# Patient Record
Sex: MALE | Race: WHITE | NOT HISPANIC OR LATINO | Employment: OTHER | URBAN - METROPOLITAN AREA
[De-identification: names, ages, dates, MRNs, and addresses within clinical notes are randomized per-mention and may not be internally consistent; named-entity substitution may affect disease eponyms.]

---

## 2017-03-20 ENCOUNTER — OFFICE VISIT (OUTPATIENT)
Dept: FAMILY MEDICINE | Facility: CLINIC | Age: 72
End: 2017-03-20
Payer: MEDICARE

## 2017-03-20 VITALS
BODY MASS INDEX: 27.94 KG/M2 | TEMPERATURE: 103 F | HEIGHT: 67 IN | DIASTOLIC BLOOD PRESSURE: 72 MMHG | HEART RATE: 96 BPM | WEIGHT: 178 LBS | OXYGEN SATURATION: 95 % | SYSTOLIC BLOOD PRESSURE: 140 MMHG

## 2017-03-20 DIAGNOSIS — J11.1 INFLUENZA: Primary | ICD-10-CM

## 2017-03-20 DIAGNOSIS — I10 HYPERTENSION, BENIGN: ICD-10-CM

## 2017-03-20 PROCEDURE — 99214 OFFICE O/P EST MOD 30 MIN: CPT | Mod: PBBFAC,PO | Performed by: NURSE PRACTITIONER

## 2017-03-20 PROCEDURE — 99214 OFFICE O/P EST MOD 30 MIN: CPT | Mod: S$PBB,,, | Performed by: NURSE PRACTITIONER

## 2017-03-20 PROCEDURE — 99999 PR PBB SHADOW E&M-EST. PATIENT-LVL IV: CPT | Mod: PBBFAC,,, | Performed by: NURSE PRACTITIONER

## 2017-03-20 RX ORDER — OSELTAMIVIR PHOSPHATE 75 MG/1
75 CAPSULE ORAL 2 TIMES DAILY
Qty: 10 CAPSULE | Refills: 0 | Status: SHIPPED | OUTPATIENT
Start: 2017-03-20 | End: 2017-03-25

## 2017-03-20 RX ORDER — CODEINE PHOSPHATE AND GUAIFENESIN 10; 100 MG/5ML; MG/5ML
5 SOLUTION ORAL 3 TIMES DAILY PRN
Qty: 180 ML | Refills: 0 | Status: SHIPPED | OUTPATIENT
Start: 2017-03-20 | End: 2017-03-30

## 2017-03-20 NOTE — PROGRESS NOTES
Patient Name: Johnathan Waters    : 1945  MRN: [id number]    Subjective:  Johnathan Manning is a 71 y.o. male who presents today for     1. 1  Day cough, bodyaches, headache, nasasl congestion, fever, taking otc generic dextromethophen without improvement.     Past Medical History  Past Medical History:   Diagnosis Date    Elevated cholesterol     Hypertension        Past Surgical History  Past Surgical History:   Procedure Laterality Date    VASECTOMY         Family History  Family History   Problem Relation Age of Onset    Cancer Father        Social History  Social History     Social History    Marital status:      Spouse name: N/A    Number of children: N/A    Years of education: N/A     Occupational History    Not on file.     Social History Main Topics    Smoking status: Never Smoker    Smokeless tobacco: Never Used    Alcohol use Not on file    Drug use: Not on file    Sexual activity: Not on file     Other Topics Concern    Not on file     Social History Narrative       Allergies  Review of patient's allergies indicates:   Allergen Reactions    Bee sting [allergen ext-venom-honey bee]     Sulfa (sulfonamide antibiotics) Other (See Comments)     Patient unsure    -reviewed and updated      Medications  Reviewed and updated.   Current Outpatient Prescriptions   Medication Sig Dispense Refill    aspirin (ECOTRIN) 81 MG EC tablet Take 81 mg by mouth once daily.      clindamycin (CLEOCIN T) 1 % external solution Apply topically as needed. 60 mL 0    IBUPROFEN ORAL Take by mouth.      lisinopril-hydrochlorothiazide (PRINZIDE,ZESTORETIC) 10-12.5 mg per tablet Take 1 tablet by mouth once daily. 90 tablet 4    pravastatin (PRAVACHOL) 20 MG tablet Take 1 tablet (20 mg total) by mouth once daily. 90 tablet 4    tamsulosin (FLOMAX) 0.4 mg Cp24 Take 1 capsule (0.4 mg total) by mouth once daily. 90 capsule 4    guaifenesin-codeine 100-10 mg/5 ml (TUSSI-ORGANIDIN NR)  mg/5 mL syrup  "Take 5 mLs by mouth 3 (three) times daily as needed for Cough. 180 mL 0    oseltamivir (TAMIFLU) 75 MG capsule Take 1 capsule (75 mg total) by mouth 2 (two) times daily. 10 capsule 0     No current facility-administered medications for this visit.          Review of Systems   Constitutional: Positive for fever.   HENT: Positive for congestion and sore throat. Negative for ear pain.    Respiratory: Positive for cough.    Cardiovascular: Negative for chest pain.   Musculoskeletal: Positive for myalgias.   Neurological: Positive for headaches.         Physical Exam  BP (!) 140/72  Pulse 96  Temp (!) 103.1 °F (39.5 °C) (Oral)   Ht 5' 7" (1.702 m)  Wt 80.7 kg (178 lb 0.3 oz)  SpO2 95%  BMI 27.88 kg/m2  Physical Exam   Constitutional: He appears well-developed. No distress.   HENT:   Head: Normocephalic.   Right Ear: Tympanic membrane normal.   Left Ear: Tympanic membrane normal.   Nose: Mucosal edema present.   Mouth/Throat: No posterior oropharyngeal edema or posterior oropharyngeal erythema.   Eyes: Conjunctivae and EOM are normal.   Cardiovascular: Normal rate, regular rhythm and normal heart sounds.    Pulmonary/Chest: Effort normal and breath sounds normal.   Skin: He is not diaphoretic.     poct influenza negative    Assessment/Plan:  Johnathan Waters is a 71 y.o. male who presents today for :    Influenza  Treated empirically, Advise zyrtec or claritin for nasal symptoms, hydration, rest  Comments:  tylenol 650 mg every 4-6 hours as needed for fever, ibuprofen 400-600 mg every 6-8 hours as needed for pain and fever  Orders:  -     POCT Influenza A/B  -     guaifenesin-codeine 100-10 mg/5 ml (TUSSI-ORGANIDIN NR)  mg/5 mL syrup; Take 5 mLs by mouth 3 (three) times daily as needed for Cough.  Dispense: 180 mL; Refill: 0  -     oseltamivir (TAMIFLU) 75 MG capsule; Take 1 capsule (75 mg total) by mouth 2 (two) times daily.  Dispense: 10 capsule; Refill: 0    Hypertension, benign  Comments:  return in 2 " weeks for nurse blood pressure check        Return if symptoms worsen or fail to improve.

## 2017-03-20 NOTE — MR AVS SNAPSHOT
[facility]  [address]  [place] [address]  Phone: [phone]  Fax: [phone]                  Johnathan Waters   3/20/2017 5:00 PM   Office Visit    Description:  Male : 1945   Provider:  Consuelo Hogan NP   Department:  [facility]           Reason for Visit     POSSIBLE FLU           Diagnoses this Visit        Comments    Influenza    -  Primary tylenol 650 mg every 4-6 hours as needed for fever, ibuprofen 400-600 mg every 6-8 hours as needed for pain and fever    Hypertension, benign     return in 2 weeks for nurse blood pressure check           To Do List           Goals (5 Years of Data)     None      Follow-Up and Disposition     Return if symptoms worsen or fail to improve.       These Medications        Disp Refills Start End    guaifenesin-codeine 100-10 mg/5 ml (TUSSI-ORGANIDIN NR)  mg/5 mL syrup 180 mL 0 3/20/2017 3/30/2017    Take 5 mLs by mouth 3 (three) times daily as needed for Cough. - Oral    Pharmacy: [facility] [address] [place] [address] Ph #: [phone]       oseltamivir (TAMIFLU) 75 MG capsule 10 capsule 0 3/20/2017 3/25/2017    Take 1 capsule (75 mg total) by mouth 2 (two) times daily. - Oral    Pharmacy: [facility] [address] Ph #: [phone]         [facility] On Call     [facility] On Call Nurse Care Line -  Assistance  Registered nurses in the [facility] provide clinical advisement, health education, appointment booking, and other advisory services.  Call for this free service at [phone].             Medications           Message regarding Medications     Verify the changes and/or additions to your medication regime listed below are the same as discussed with your clinician today.  If any of these changes or additions are incorrect, please notify your healthcare provider.        START taking  "these NEW medications        Refills    guaifenesin-codeine 100-10 mg/5 ml (TUSSI-ORGANIDIN NR)  mg/5 mL syrup 0    Sig: Take 5 mLs by mouth 3 (three) times daily as needed for Cough.    Class: Print    Route: Oral    oseltamivir (TAMIFLU) 75 MG capsule 0    Sig: Take 1 capsule (75 mg total) by mouth 2 (two) times daily.    Class: Normal    Route: Oral           Verify that the below list of medications is an accurate representation of the medications you are currently taking.  If none reported, the list may be blank. If incorrect, please contact your healthcare provider. Carry this list with you in case of emergency.           Current Medications     aspirin (ECOTRIN) 81 MG EC tablet Take 81 mg by mouth once daily.    clindamycin (CLEOCIN T) 1 % external solution Apply topically as needed.    IBUPROFEN ORAL Take by mouth.    lisinopril-hydrochlorothiazide (PRINZIDE,ZESTORETIC) 10-12.5 mg per tablet Take 1 tablet by mouth once daily.    pravastatin (PRAVACHOL) 20 MG tablet Take 1 tablet (20 mg total) by mouth once daily.    tamsulosin (FLOMAX) 0.4 mg Cp24 Take 1 capsule (0.4 mg total) by mouth once daily.    guaifenesin-codeine 100-10 mg/5 ml (TUSSI-ORGANIDIN NR)  mg/5 mL syrup Take 5 mLs by mouth 3 (three) times daily as needed for Cough.    oseltamivir (TAMIFLU) 75 MG capsule Take 1 capsule (75 mg total) by mouth 2 (two) times daily.           Clinical Reference Information           Your Vitals Were     BP Pulse Temp Height Weight SpO2    140/72 96 103.1 °F (39.5 °C) (Oral) 5' 7" (1.702 m) 80.7 kg (178 lb 0.3 oz) 95%    BMI                27.88 kg/m2          Blood Pressure          Most Recent Value    BP  (!)  140/72      Allergies as of 3/20/2017     Bee Sting [Allergen Ext-venom-honey Bee]    Sulfa (Sulfonamide Antibiotics)      Immunizations Administered on Date of Encounter - 3/20/2017     None      Orders Placed During Today's Visit      Normal Orders This Visit    POCT Influenza A/B     "   Instructions      Influenza (Adult)    Influenza is also called the flu. It is a viral illness that affects the air passages of your lungs. It is different from the common cold. The flu can easily be passed from one to person to another. It may be spread through the air by coughing and sneezing. Or it can be spread by touching the sick person and then touching your own eyes, nose, or mouth.  The flu starts 1 to 3 days after you are exposed to the flu virus. It may last for 1 to 2 weeks. You usually dont need to take antibiotics unless you have a complication. This might be an ear or sinus infection or pneumonia.  Symptoms of the flu may be mild or severe. They can include extreme tiredness (wanting to stay in bed all day), chills, fevers, muscle aches, soreness with eye movement, headache, and a dry, hacking cough.  Home care  Follow these guidelines when caring for yourself at home:  · Avoid being around cigarette smoke, whether yours or other peoples.  · Acetaminophen or ibuprofen will help ease your fever, muscle aches, and headache. Dont give aspirin to anyone younger than 18 who has the flu. Aspirin can harm the liver.  · Nausea and loss of appetite are common with the flu. Eat light meals. Drink 6 to 8 glasses of liquids every day. Good choices are water, sport drinks, soft drinks without caffeine, juices, tea, and soup. Extra fluids will also help loosen secretions in your nose and lungs.  · Over-the-counter cold medicines will not make the flu go away faster. But the medicines may help with coughing, sore throat, and congestion in your nose and sinuses. Dont use a decongestant if you have high blood pressure.  · Stay home until your fever has been gone for at least 24 hours without using medicine to reduce fever.  Follow-up care  Follow up with your healthcare provider, or as advised, if you are not getting better over the next week.  If you are 65 or older, talk with your provider about getting a  pneumococcal vaccine every 5 years. You should also get this vaccine if you have chronic asthma or COPD. All adults should get a flu vaccine every fall. Ask your provider about this.  When to seek medical advice  Call your healthcare provider right away if any of these occur:  · Cough with lots of colored sputum (mucus) or blood in your sputum  · Chest pain, shortness of breath, wheezing, or difficulty breathing  · Severe headache, or face, neck, or ear pain  · New rash with fever  · Fever of 100.4°F (38°C) or higher, or as directed by your healthcare provider  · Confusion, behavior change, or seizure  · Severe weakness or dizziness  · You get a fever or cough after getting better for a few days  Date Last Reviewed: 12/23/2014  © 4213-4167 [employer]. [address] [place] [address], [place] [address]. All rights reserved. This information is not intended as a substitute for professional medical care. Always follow your healthcare professional's instructions.             Language Assistance Services     ATTENTION: Language assistance services are available, free of charge. Please call [phone].      ATENCIÓN: Si habla español, tiene a rasmussen disposición servicios gratuitos de asistencia lingüística. Llame al [phone].     KALEY Ý: N?u b?n nói Ti?ng Vi?t, có các d?ch v? h? tr? ngôn ng? mi?n phí dành cho b?n. G?i s? [phone].         [facility] complies with applicable Federal civil rights laws and does not discriminate on the basis of race, color, national origin, age, disability, or sex.

## 2017-03-20 NOTE — PATIENT INSTRUCTIONS
Influenza (Adult)    Influenza is also called the flu. It is a viral illness that affects the air passages of your lungs. It is different from the common cold. The flu can easily be passed from one to person to another. It may be spread through the air by coughing and sneezing. Or it can be spread by touching the sick person and then touching your own eyes, nose, or mouth.  The flu starts 1 to 3 days after you are exposed to the flu virus. It may last for 1 to 2 weeks. You usually dont need to take antibiotics unless you have a complication. This might be an ear or sinus infection or pneumonia.  Symptoms of the flu may be mild or severe. They can include extreme tiredness (wanting to stay in bed all day), chills, fevers, muscle aches, soreness with eye movement, headache, and a dry, hacking cough.  Home care  Follow these guidelines when caring for yourself at home:  · Avoid being around cigarette smoke, whether yours or other peoples.  · Acetaminophen or ibuprofen will help ease your fever, muscle aches, and headache. Dont give aspirin to anyone younger than 18 who has the flu. Aspirin can harm the liver.  · Nausea and loss of appetite are common with the flu. Eat light meals. Drink 6 to 8 glasses of liquids every day. Good choices are water, sport drinks, soft drinks without caffeine, juices, tea, and soup. Extra fluids will also help loosen secretions in your nose and lungs.  · Over-the-counter cold medicines will not make the flu go away faster. But the medicines may help with coughing, sore throat, and congestion in your nose and sinuses. Dont use a decongestant if you have high blood pressure.  · Stay home until your fever has been gone for at least 24 hours without using medicine to reduce fever.  Follow-up care  Follow up with your healthcare provider, or as advised, if you are not getting better over the next week.  If you are 65 or older, talk with your provider about getting a pneumococcal vaccine  every 5 years. You should also get this vaccine if you have chronic asthma or COPD. All adults should get a flu vaccine every fall. Ask your provider about this.  When to seek medical advice  Call your healthcare provider right away if any of these occur:  · Cough with lots of colored sputum (mucus) or blood in your sputum  · Chest pain, shortness of breath, wheezing, or difficulty breathing  · Severe headache, or face, neck, or ear pain  · New rash with fever  · Fever of 100.4°F (38°C) or higher, or as directed by your healthcare provider  · Confusion, behavior change, or seizure  · Severe weakness or dizziness  · You get a fever or cough after getting better for a few days  Date Last Reviewed: 12/23/2014  © 6274-5563 [employer]. [address] [place] [address], [place] [address]. All rights reserved. This information is not intended as a substitute for professional medical care. Always follow your healthcare professional's instructions.

## 2017-05-01 ENCOUNTER — OFFICE VISIT (OUTPATIENT)
Dept: FAMILY MEDICINE | Facility: CLINIC | Age: 72
End: 2017-05-01
Payer: MEDICARE

## 2017-05-01 VITALS
SYSTOLIC BLOOD PRESSURE: 112 MMHG | HEIGHT: 67 IN | TEMPERATURE: 99 F | DIASTOLIC BLOOD PRESSURE: 72 MMHG | BODY MASS INDEX: 27.94 KG/M2 | HEART RATE: 57 BPM | WEIGHT: 178 LBS | RESPIRATION RATE: 16 BRPM | OXYGEN SATURATION: 97 %

## 2017-05-01 DIAGNOSIS — L30.9 DERMATITIS: Primary | ICD-10-CM

## 2017-05-01 PROCEDURE — 99213 OFFICE O/P EST LOW 20 MIN: CPT | Mod: S$PBB,,, | Performed by: NURSE PRACTITIONER

## 2017-05-01 PROCEDURE — 99999 PR PBB SHADOW E&M-EST. PATIENT-LVL IV: CPT | Mod: PBBFAC,,, | Performed by: NURSE PRACTITIONER

## 2017-05-01 PROCEDURE — 99214 OFFICE O/P EST MOD 30 MIN: CPT | Mod: PBBFAC,PO | Performed by: NURSE PRACTITIONER

## 2017-05-01 NOTE — PATIENT INSTRUCTIONS
Nonspecific Dermatitis  Dermatitis is a skin rash caused by something that touches the skin and makes it irritated and inflamed.  Your skin may be red, swollen, dry, and may be cracked. Blisters may form and ooze. The rash will itch.  Dermatitis can form on the face and neck, backs of hands, forearms, genitals, and lower legs. Dermatitis is not passed from person to person.  Talk with your health care provider about what may have caused the rash. Common things that cause skin allergies are metal in jewelry, plants like poison ivy or poison oak, and certain skin care products. You will need to avoid the source of your rash in the future to prevent it from coming back. In some cases, the cause of the dermatitis may not be found.  Treatment is done to relieve itching and prevent the rash from coming back. The rash should go away in a few days to a few weeks.  Home care  The health care provider may prescribe medications to relieve swelling and itching. Follow all instructions when using these medications.  · Avoid anything that heats up your skin, such as hot showers or baths, or direct sunlight. This can make itching worse.  · Stay away from whatever you think caused the rash.  · Bathe in warm, not hot, water. Apply a moisturizing lotion after bathing to prevent dry skin.  · Avoid skin irritants such as wool or silk clothing, grease, oils, harsh soaps, and detergents.  · Apply cold compresses to soothe your sores to help relieve your symptoms. Do this for 30 minutes 3 to 4 times a day. You can make a cold compress by soaking a cloth in cold water. Squeeze out excess water. You can add colloidal oatmeal to the water to help reduce itching. For severe itching in a small area, apply an ice pack wrapped in a thin towel. Do this for 20 minutes 3 to 4 times a day.  · You can also help relieve large areas of itching by taking a lukewarm bath with colloidal oatmeal added to the water.  · Use hydrocortisone cream for redness  and irritation, unless another medicine was prescribed. You can also use benzocaine anesthetic cream or spray.  · Use oral diphenhydramine to help reduce itching. This is an antihistamine you can buy at drug and grocery stores. It can make you sleepy, so use lower doses during the daytime. Or you can use loratadine. This is an antihistamine that will not make you sleepy. Dont use diphenhydramine if you have glaucoma or have trouble urinating because of an enlarged prostate.  · Wash your hands or use an antibacterial gel often to prevent the spread of the rash.  Follow-up care  Follow up with your health care provider. Make an appointment with your health care provider if your symptoms do not get better in the next 1 to 2 weeks.  When to seek medical advice  Call your health care provider right away if any of these occur:  · Spreading of the rash to other parts of your body  · Severe swelling of your face, eyelids, mouth, throat or tongue  · Trouble urinating due to swelling in the genital area  · Fever of 100.4°F (38°C) or higher  · Redness or swelling that gets worse  · Pain that gets worse  · Foul-smelling fluid leaking from the skin  · Yellow-brown crusts on the open blisters  · Joint pain   Date Last Reviewed: 7/23/2014  © 5162-6980 [employer]. [address] [place] [address], [place] [address]. All rights reserved. This information is not intended as a substitute for professional medical care. Always follow your healthcare professional's instructions.

## 2017-05-01 NOTE — MR AVS SNAPSHOT
[facility]  [address]  [place] [address]  Phone: [phone]  Fax: [phone]                  Johnathan Waters   2017 5:30 PM   Office Visit    Description:  Male : 1945   Provider:  Consuelo Hogan NP   Department:  [facility]           Reason for Visit     Rash           Diagnoses this Visit        Comments    Dermatitis    -  Primary            To Do List           Goals (5 Years of Data)     None      Follow-Up and Disposition     Return if symptoms worsen or fail to improve in 2 days.      [facility]     [facility] -  Assistance  Unless otherwise directed by your provider, please contact [facility], our nurse care line that is available for  assistance.     Registered nurses in the [facility] provide: appointment scheduling, clinical advisement, health education, and other advisory services.  Call: [phone] (toll free)               Medications           Message regarding Medications     Verify the changes and/or additions to your medication regime listed below are the same as discussed with your clinician today.  If any of these changes or additions are incorrect, please notify your healthcare provider.             Verify that the below list of medications is an accurate representation of the medications you are currently taking.  If none reported, the list may be blank. If incorrect, please contact your healthcare provider. Carry this list with you in case of emergency.           Current Medications     aspirin (ECOTRIN) 81 MG EC tablet Take 81 mg by mouth once daily.    clindamycin (CLEOCIN T) 1 % external solution Apply topically as needed.    IBUPROFEN ORAL Take by mouth.    lisinopril-hydrochlorothiazide (PRINZIDE,ZESTORETIC) 10-12.5 mg per tablet Take 1 tablet by mouth once daily.    pravastatin (PRAVACHOL) 20 MG tablet Take 1 tablet (20 mg total) by mouth once daily.    tamsulosin  "(FLOMAX) 0.4 mg Cp24 Take 1 capsule (0.4 mg total) by mouth once daily.           Clinical Reference Information           Your Vitals Were     BP Pulse Temp Resp Height Weight    112/72 (BP Location: Right arm, Patient Position: Sitting, BP Method: Manual) 57 98.9 °F (37.2 °C) (Oral) 16 5' 7" (1.702 m) 80.7 kg (178 lb 0.3 oz)    SpO2 BMI             97% 27.88 kg/m2         Blood Pressure          Most Recent Value    BP  112/72      Allergies as of 5/1/2017     Bee Sting [Allergen Ext-venom-honey Bee]    Sulfa (Sulfonamide Antibiotics)      Immunizations Administered on Date of Encounter - 5/1/2017     None      Instructions      Nonspecific Dermatitis  Dermatitis is a skin rash caused by something that touches the skin and makes it irritated and inflamed.  Your skin may be red, swollen, dry, and may be cracked. Blisters may form and ooze. The rash will itch.  Dermatitis can form on the face and neck, backs of hands, forearms, genitals, and lower legs. Dermatitis is not passed from person to person.  Talk with your health care provider about what may have caused the rash. Common things that cause skin allergies are metal in jewelry, plants like poison ivy or poison oak, and certain skin care products. You will need to avoid the source of your rash in the future to prevent it from coming back. In some cases, the cause of the dermatitis may not be found.  Treatment is done to relieve itching and prevent the rash from coming back. The rash should go away in a few days to a few weeks.  Home care  The health care provider may prescribe medications to relieve swelling and itching. Follow all instructions when using these medications.  · Avoid anything that heats up your skin, such as hot showers or baths, or direct sunlight. This can make itching worse.  · Stay away from whatever you think caused the rash.  · Bathe in warm, not hot, water. Apply a moisturizing lotion after bathing to prevent dry skin.  · Avoid skin " irritants such as wool or silk clothing, grease, oils, harsh soaps, and detergents.  · Apply cold compresses to soothe your sores to help relieve your symptoms. Do this for 30 minutes 3 to 4 times a day. You can make a cold compress by soaking a cloth in cold water. Squeeze out excess water. You can add colloidal oatmeal to the water to help reduce itching. For severe itching in a small area, apply an ice pack wrapped in a thin towel. Do this for 20 minutes 3 to 4 times a day.  · You can also help relieve large areas of itching by taking a lukewarm bath with colloidal oatmeal added to the water.  · Use hydrocortisone cream for redness and irritation, unless another medicine was prescribed. You can also use benzocaine anesthetic cream or spray.  · Use oral diphenhydramine to help reduce itching. This is an antihistamine you can buy at drug and grocery stores. It can make you sleepy, so use lower doses during the daytime. Or you can use loratadine. This is an antihistamine that will not make you sleepy. Dont use diphenhydramine if you have glaucoma or have trouble urinating because of an enlarged prostate.  · Wash your hands or use an antibacterial gel often to prevent the spread of the rash.  Follow-up care  Follow up with your health care provider. Make an appointment with your health care provider if your symptoms do not get better in the next 1 to 2 weeks.  When to seek medical advice  Call your health care provider right away if any of these occur:  · Spreading of the rash to other parts of your body  · Severe swelling of your face, eyelids, mouth, throat or tongue  · Trouble urinating due to swelling in the genital area  · Fever of 100.4°F (38°C) or higher  · Redness or swelling that gets worse  · Pain that gets worse  · Foul-smelling fluid leaking from the skin  · Yellow-brown crusts on the open blisters  · Joint pain   Date Last Reviewed: 7/23/2014  © 7026-4685 [employer]. [address] [place] [address]  Road, [place] [address]. All rights reserved. This information is not intended as a substitute for professional medical care. Always follow your healthcare professional's instructions.             Language Assistance Services     ATTENTION: Language assistance services are available, free of charge. Please call [phone].      ATENCIÓN: Si habla jonathan, tiene a rasmussen disposición servicios gratuitos de asistencia lingüística. Llame al [phone].     CHÚ Ý: N?u b?n nói Ti?ng Vi?t, có các d?ch v? h? tr? ngôn ng? mi?n phí dành cho b?n. G?i s? [phone].         [facility] complies with applicable Federal civil rights laws and does not discriminate on the basis of race, color, national origin, age, disability, or sex.

## 2017-05-01 NOTE — PROGRESS NOTES
Patient Name: Johnathan Waters    : 1945  MRN: [id number]    Subjective:  Johnathan Manning is a 71 y.o. male who presents today for     1. Itchy rash to right anterior leg started yesterday after working out in yard. Applied hydrocortisone cream with some improvement.     Past Medical History  Past Medical History:   Diagnosis Date    Elevated cholesterol     Hypertension        Past Surgical History  Past Surgical History:   Procedure Laterality Date    VASECTOMY         Family History  Family History   Problem Relation Age of Onset    Cancer Father        Social History  Social History     Social History    Marital status:      Spouse name: N/A    Number of children: N/A    Years of education: N/A     Occupational History    Not on file.     Social History Main Topics    Smoking status: Never Smoker    Smokeless tobacco: Never Used    Alcohol use Not on file    Drug use: Not on file    Sexual activity: Not on file     Other Topics Concern    Not on file     Social History Narrative       Allergies  Review of patient's allergies indicates:   Allergen Reactions    Bee sting [allergen ext-venom-honey bee]     Sulfa (sulfonamide antibiotics) Other (See Comments)     Patient unsure    -reviewed and updated      Medications  Reviewed and updated.   Current Outpatient Prescriptions   Medication Sig Dispense Refill    aspirin (ECOTRIN) 81 MG EC tablet Take 81 mg by mouth once daily.      clindamycin (CLEOCIN T) 1 % external solution Apply topically as needed. 60 mL 0    IBUPROFEN ORAL Take by mouth.      lisinopril-hydrochlorothiazide (PRINZIDE,ZESTORETIC) 10-12.5 mg per tablet Take 1 tablet by mouth once daily. 90 tablet 4    pravastatin (PRAVACHOL) 20 MG tablet Take 1 tablet (20 mg total) by mouth once daily. 90 tablet 4    tamsulosin (FLOMAX) 0.4 mg Cp24 Take 1 capsule (0.4 mg total) by mouth once daily. 90 capsule 4     No current facility-administered medications for this visit.   "        Review of Systems   Constitutional: Negative for chills and fever.   Respiratory: Negative for shortness of breath.    Cardiovascular: Negative for chest pain.   Musculoskeletal: Positive for myalgias (some soreness near the site medial leg). Negative for joint pain.   Skin: Positive for itching and rash.         Physical Exam  /72 (BP Location: Right arm, Patient Position: Sitting, BP Method: Manual)  Pulse (!) 57  Temp 98.9 °F (37.2 °C) (Oral)   Resp 16  Ht 5' 7" (1.702 m)  Wt 80.7 kg (178 lb 0.3 oz)  SpO2 97%  BMI 27.88 kg/m2  Physical Exam   Constitutional: He is oriented to person, place, and time. He appears well-developed. No distress.   Pulmonary/Chest: Effort normal.   Musculoskeletal: Normal range of motion.   Neurological: He is alert and oriented to person, place, and time.   Skin: He is not diaphoretic.   Erythematous pruritic patch to right anterior leg, nontender to touch with 2 shallow indention about 3 -4 mm          Assessment/Plan:  Johnathan Waters is a 71 y.o. male who presents today for :    Dermatitis  no obvious sxs infection, advise medrol pack which pt has extra pack at home    Return if symptoms worsen or fail to improve in 2 days.      "

## 2017-06-01 ENCOUNTER — PATIENT MESSAGE (OUTPATIENT)
Dept: FAMILY MEDICINE | Facility: CLINIC | Age: 72
End: 2017-06-01

## 2017-06-01 ENCOUNTER — OFFICE VISIT (OUTPATIENT)
Dept: FAMILY MEDICINE | Facility: CLINIC | Age: 72
End: 2017-06-01
Payer: MEDICARE

## 2017-06-01 ENCOUNTER — LAB VISIT (OUTPATIENT)
Dept: LAB | Facility: HOSPITAL | Age: 72
End: 2017-06-01
Attending: INTERNAL MEDICINE
Payer: MEDICARE

## 2017-06-01 VITALS
OXYGEN SATURATION: 99 % | SYSTOLIC BLOOD PRESSURE: 120 MMHG | HEART RATE: 53 BPM | BODY MASS INDEX: 28.13 KG/M2 | WEIGHT: 179.25 LBS | DIASTOLIC BLOOD PRESSURE: 80 MMHG | TEMPERATURE: 98 F | HEIGHT: 67 IN

## 2017-06-01 DIAGNOSIS — Z11.59 NEED FOR HEPATITIS C SCREENING TEST: ICD-10-CM

## 2017-06-01 DIAGNOSIS — A09 TRAVELER'S DIARRHEA: ICD-10-CM

## 2017-06-01 DIAGNOSIS — E78.5 HYPERLIPIDEMIA, UNSPECIFIED HYPERLIPIDEMIA TYPE: ICD-10-CM

## 2017-06-01 DIAGNOSIS — N40.1 BENIGN NON-NODULAR PROSTATIC HYPERPLASIA WITH LOWER URINARY TRACT SYMPTOMS: ICD-10-CM

## 2017-06-01 DIAGNOSIS — R53.83 FATIGUE, UNSPECIFIED TYPE: ICD-10-CM

## 2017-06-01 DIAGNOSIS — I10 ESSENTIAL HYPERTENSION: Primary | ICD-10-CM

## 2017-06-01 DIAGNOSIS — I10 ESSENTIAL HYPERTENSION: ICD-10-CM

## 2017-06-01 LAB
ALBUMIN SERPL BCP-MCNC: 3.9 G/DL
ALP SERPL-CCNC: 60 U/L
ALT SERPL W/O P-5'-P-CCNC: 25 U/L
ANION GAP SERPL CALC-SCNC: 9 MMOL/L
AST SERPL-CCNC: 17 U/L
BASOPHILS # BLD AUTO: 0.03 K/UL
BASOPHILS NFR BLD: 0.4 %
BILIRUB SERPL-MCNC: 0.6 MG/DL
BUN SERPL-MCNC: 15 MG/DL
CALCIUM SERPL-MCNC: 9.6 MG/DL
CHLORIDE SERPL-SCNC: 102 MMOL/L
CHOLEST/HDLC SERPL: 3.2 {RATIO}
CO2 SERPL-SCNC: 29 MMOL/L
CREAT SERPL-MCNC: 1 MG/DL
DIFFERENTIAL METHOD: NORMAL
EOSINOPHIL # BLD AUTO: 0.3 K/UL
EOSINOPHIL NFR BLD: 4 %
ERYTHROCYTE [DISTWIDTH] IN BLOOD BY AUTOMATED COUNT: 13.9 %
EST. GFR  (AFRICAN AMERICAN): >60 ML/MIN/1.73 M^2
EST. GFR  (NON AFRICAN AMERICAN): >60 ML/MIN/1.73 M^2
GLUCOSE SERPL-MCNC: 97 MG/DL
HCT VFR BLD AUTO: 45.6 %
HDL/CHOLESTEROL RATIO: 30.9 %
HDLC SERPL-MCNC: 178 MG/DL
HDLC SERPL-MCNC: 55 MG/DL
HGB BLD-MCNC: 14.9 G/DL
LDLC SERPL CALC-MCNC: 90.6 MG/DL
LYMPHOCYTES # BLD AUTO: 1.5 K/UL
LYMPHOCYTES NFR BLD: 19.1 %
MCH RBC QN AUTO: 29 PG
MCHC RBC AUTO-ENTMCNC: 32.7 %
MCV RBC AUTO: 89 FL
MONOCYTES # BLD AUTO: 0.8 K/UL
MONOCYTES NFR BLD: 10.9 %
NEUTROPHILS # BLD AUTO: 5 K/UL
NEUTROPHILS NFR BLD: 65.5 %
NONHDLC SERPL-MCNC: 123 MG/DL
PLATELET # BLD AUTO: 288 K/UL
PMV BLD AUTO: 11.5 FL
POTASSIUM SERPL-SCNC: 4.1 MMOL/L
PROT SERPL-MCNC: 7 G/DL
RBC # BLD AUTO: 5.13 M/UL
SODIUM SERPL-SCNC: 140 MMOL/L
TRIGL SERPL-MCNC: 162 MG/DL
WBC # BLD AUTO: 7.69 K/UL

## 2017-06-01 PROCEDURE — 85025 COMPLETE CBC W/AUTO DIFF WBC: CPT

## 2017-06-01 PROCEDURE — 99999 PR PBB SHADOW E&M-EST. PATIENT-LVL IV: CPT | Mod: PBBFAC,,, | Performed by: INTERNAL MEDICINE

## 2017-06-01 PROCEDURE — 86803 HEPATITIS C AB TEST: CPT

## 2017-06-01 PROCEDURE — 99214 OFFICE O/P EST MOD 30 MIN: CPT | Mod: S$PBB,,, | Performed by: INTERNAL MEDICINE

## 2017-06-01 PROCEDURE — 80053 COMPREHEN METABOLIC PANEL: CPT

## 2017-06-01 PROCEDURE — 36415 COLL VENOUS BLD VENIPUNCTURE: CPT | Mod: PO

## 2017-06-01 PROCEDURE — 80061 LIPID PANEL: CPT

## 2017-06-01 RX ORDER — PRAVASTATIN SODIUM 20 MG/1
20 TABLET ORAL DAILY
Qty: 90 TABLET | Refills: 4 | Status: SHIPPED | OUTPATIENT
Start: 2017-06-01 | End: 2018-05-28 | Stop reason: SDUPTHER

## 2017-06-01 RX ORDER — PRAVASTATIN SODIUM 20 MG/1
20 TABLET ORAL DAILY
Qty: 90 TABLET | Refills: 4 | Status: SHIPPED | OUTPATIENT
Start: 2017-06-01 | End: 2017-06-01 | Stop reason: SDUPTHER

## 2017-06-01 RX ORDER — LISINOPRIL AND HYDROCHLOROTHIAZIDE 10; 12.5 MG/1; MG/1
1 TABLET ORAL DAILY
Qty: 90 TABLET | Refills: 4 | Status: SHIPPED | OUTPATIENT
Start: 2017-06-01 | End: 2018-05-28 | Stop reason: SDUPTHER

## 2017-06-01 RX ORDER — TAMSULOSIN HYDROCHLORIDE 0.4 MG/1
0.4 CAPSULE ORAL DAILY
Qty: 90 CAPSULE | Refills: 4 | Status: SHIPPED | OUTPATIENT
Start: 2017-06-01 | End: 2017-06-01 | Stop reason: SDUPTHER

## 2017-06-01 RX ORDER — CIPROFLOXACIN 500 MG/1
500 TABLET ORAL 2 TIMES DAILY
Qty: 12 TABLET | Refills: 0 | Status: SHIPPED | OUTPATIENT
Start: 2017-06-01 | End: 2018-07-18

## 2017-06-01 RX ORDER — LISINOPRIL AND HYDROCHLOROTHIAZIDE 10; 12.5 MG/1; MG/1
1 TABLET ORAL DAILY
Qty: 90 TABLET | Refills: 4 | Status: SHIPPED | OUTPATIENT
Start: 2017-06-01 | End: 2017-06-01 | Stop reason: SDUPTHER

## 2017-06-01 RX ORDER — TAMSULOSIN HYDROCHLORIDE 0.4 MG/1
0.4 CAPSULE ORAL DAILY
Qty: 90 CAPSULE | Refills: 4 | Status: SHIPPED | OUTPATIENT
Start: 2017-06-01 | End: 2018-05-28 | Stop reason: SDUPTHER

## 2017-06-01 NOTE — PROGRESS NOTES
Chief Complaint  Chief Complaint   Patient presents with    [facility]    Hypertension       HPI  Johnathan Waters is a 72 y.o. male with multiple medical diagnoses as listed in the medical history and problem list that presents for [facility].  Pt is new to me but is known to this clinic with his last appointment being 5/1/2017.  Last seen by PCP 05/2016.  The patient was previously followed by one of my partners that has since retired .  I have reviewed their most recent previous OV with their previous PCP, most recent labs and most recent imaging studies and interpreted them myself.  He checks his home BP and mostly sees mostly 120s-130s/60s-70s.  He exercises with spinning class 1-3 times a week.  He doesn't use extra salt on his foods and limits red meats.  Denies CP, SOB, palpitations.  He had a C-scope in 2006 that was normal.  Reports having zostavax and both PCV already with vaccine records at home.      PAST MEDICAL HISTORY:  Past Medical History:   Diagnosis Date    Elevated cholesterol     Hypertension     Squamous cell carcinoma     under right eye.  Sees outside Dermatology       PAST SURGICAL HISTORY:  Past Surgical History:   Procedure Laterality Date    VASECTOMY         SOCIAL HISTORY:  Social History     Social History    Marital status:      Spouse name: N/A    Number of children: N/A    Years of education: N/A     Occupational History    Not on file.     Social History Main Topics    Smoking status: Never Smoker    Smokeless tobacco: Never Used    Alcohol use No    Drug use: No    Sexual activity: Yes     Partners: Female      Comment:      Other Topics Concern    Not on file     Social History Narrative    No narrative on file       FAMILY HISTORY:  Family History   Problem Relation Age of Onset    Hypertension Mother     Cancer Father      lung    Diabetes Sister        ALLERGIES AND MEDICATIONS: updated and reviewed.  Review of patient's allergies  indicates:   Allergen Reactions    Bee sting [allergen ext-venom-honey bee]     Sulfa (sulfonamide antibiotics) Other (See Comments)     Patient unsure     Current Outpatient Prescriptions   Medication Sig Dispense Refill    aspirin (ECOTRIN) 81 MG EC tablet Take 81 mg by mouth once daily.      clindamycin (CLEOCIN T) 1 % external solution Apply topically as needed. 60 mL 0    IBUPROFEN ORAL Take by mouth.      lisinopril-hydrochlorothiazide (PRINZIDE,ZESTORETIC) 10-12.5 mg per tablet Take 1 tablet by mouth once daily. 90 tablet 4    pravastatin (PRAVACHOL) 20 MG tablet Take 1 tablet (20 mg total) by mouth once daily. 90 tablet 4    tamsulosin (FLOMAX) 0.4 mg Cp24 Take 1 capsule (0.4 mg total) by mouth once daily. 90 capsule 4    ciprofloxacin HCl (CIPRO) 500 MG tablet Take 1 tablet (500 mg total) by mouth 2 (two) times daily. 12 tablet 0     No current facility-administered medications for this visit.          ROS  Review of Systems   Constitutional: Negative for chills, fatigue, fever and unexpected weight change.   HENT: Negative for congestion, dental problem, ear discharge, ear pain, hearing loss, postnasal drip, rhinorrhea, sinus pressure, sore throat and trouble swallowing.    Eyes: Negative for pain, discharge, redness, itching and visual disturbance.   Respiratory: Negative for cough, chest tightness, shortness of breath and wheezing.    Cardiovascular: Negative for chest pain, palpitations and leg swelling.   Gastrointestinal: Negative for abdominal distention, abdominal pain, blood in stool, constipation, diarrhea, nausea and vomiting.        No melena   Endocrine: Negative for cold intolerance, heat intolerance, polydipsia, polyphagia and polyuria.        No nocturia   Genitourinary: Negative for decreased urine volume, difficulty urinating, discharge, dysuria, flank pain, frequency, hematuria, penile pain, penile swelling, scrotal swelling, testicular pain and urgency.   Musculoskeletal:  "Negative for arthralgias, gait problem, joint swelling, myalgias, neck pain and neck stiffness.   Skin: Negative for color change, pallor and rash.   Neurological: Negative for dizziness, tremors, seizures, syncope, weakness, light-headedness and headaches.   Hematological: Negative for adenopathy. Does not bruise/bleed easily.   Psychiatric/Behavioral: Negative for confusion, decreased concentration, sleep disturbance and suicidal ideas. The patient is not nervous/anxious.         No depression         Physical Exam  Vitals:    06/01/17 0931 06/01/17 1014   BP: (!) 144/86 120/80   BP Location: Left arm    Patient Position: Sitting    BP Method: Manual    Pulse: (!) 53    Temp: 97.7 °F (36.5 °C)    TempSrc: Oral    SpO2: 99%    Weight: 81.3 kg (179 lb 3.7 oz)    Height: 5' 7" (1.702 m)     Body mass index is 28.07 kg/m².  Weight: 81.3 kg (179 lb 3.7 oz)   Height: 5' 7" (170.2 cm)   General appearance: alert, appears stated age, cooperative and no distress  Head: Normocephalic, without obvious abnormality, atraumatic  Eyes: PERRL EOMI conjunctiva clear  Ears: normal TM's and external ear canals both ears  Nose: Nares normal. Septum midline. Mucosa normal. No drainage or sinus tenderness.  Throat: lips, mucosa, and tongue normal; teeth and gums normal  Neck: no adenopathy, no carotid bruit, no JVD, supple, symmetrical, trachea midline and thyroid not enlarged, symmetric, no tenderness/mass/nodules  Back: no tenderness to percussion or palpation  Lungs: clear to auscultation bilaterally  Heart: regular rate and rhythm, no S3 or S4, systolic murmur: systolic ejection 2/6, crescendo and decrescendo at 2nd right intercostal space, no click and no rub  Abdomen: soft, non-tender; bowel sounds normal; no masses,  no organomegaly  Extremities: extremities normal, atraumatic, no cyanosis or edema  Pulses: 2+ and symmetric  Skin: Skin color, texture, turgor normal. No rashes or lesions  Neurologic: Mental status: Alert, " oriented, thought content appropriate  Sensory: normal  Motor:grossly normal  Coordination: normal  Gait: Normal  Symmetric facial movements palate elevated symmetrically tongue midline     Health Maintenance       Date Due Completion Date    Hepatitis C Screening 1945 ---    Colonoscopy 05/02/1995 ---    Zoster Vaccine 05/02/2005 ---    Pneumococcal (65+) (2 of 2 - PPSV23) 05/11/2016 5/11/2015    Influenza Vaccine 08/01/2017 9/28/2013    Lipid Panel 05/30/2021 5/30/2016    TETANUS VACCINE 12/26/2024 12/26/2014            Assessment & Plan  Problem List Items Addressed This Visit        Cardiac    Essential hypertension - Primary    Current Assessment & Plan     The current medical regimen is effective;  continue present plan and medications.          Relevant Medications    lisinopril-hydrochlorothiazide (PRINZIDE,ZESTORETIC) 10-12.5 mg per tablet    Other Relevant Orders    CBC auto differential (Completed)    Comprehensive metabolic panel (Completed)    Lipid panel (Completed)       Renal    Benign non-nodular prostatic hyperplasia with lower urinary tract symptoms    Current Assessment & Plan     The current medical regimen is effective;  continue present plan and medications. The natural history of prostate cancer and ongoing controversy regarding screening and potential treatment outcomes of prostate cancer has been discussed with the patient. The meaning of a false positive PSA and a false negative PSA has been discussed. He indicates understanding of the limitations of this screening test and wishes not to proceed with screening PSA testing.          Relevant Medications    tamsulosin (FLOMAX) 0.4 mg Cp24       Fluids/Electrolytes/Nutrition/GI    Hyperlipidemia    Current Assessment & Plan     The current medical regimen is effective;  continue present plan and medications.          Relevant Medications    pravastatin (PRAVACHOL) 20 MG tablet      Other Visit Diagnoses     Need for hepatitis C  screening test    -  Screen per CDC guidelines    Relevant Orders    Hepatitis C antibody    Fatigue, unspecified type    -  Check FOBT    Relevant Orders    Occult blood x 1, stool    Occult blood x 1, stool    Occult blood x 1, stool    Traveler's diarrhea    -  Provided with information with when and how to dose with this medication.  Enough for 2 rounds of treatment.     Relevant Medications    ciprofloxacin HCl (CIPRO) 500 MG tablet            Health Maintenance reviewed, reports having had zostavax and both PCV already.  Will send me home records.  Traveling a lot for work still so will proceed with stool cards for C-scope screening.    Follow-up: Return in about 1 year (around 6/1/2018) for Routine Physical.

## 2017-06-01 NOTE — PATIENT INSTRUCTIONS
Things look great.      I will be in touch with your lab results.      Drop off the stool cards at the lab downstairs

## 2017-06-01 NOTE — ASSESSMENT & PLAN NOTE
The current medical regimen is effective;  continue present plan and medications. The natural history of prostate cancer and ongoing controversy regarding screening and potential treatment outcomes of prostate cancer has been discussed with the patient. The meaning of a false positive PSA and a false negative PSA has been discussed. He indicates understanding of the limitations of this screening test and wishes not to proceed with screening PSA testing.

## 2017-06-02 LAB — HCV AB SERPL QL IA: NEGATIVE

## 2017-06-02 NOTE — PROGRESS NOTES
Your lab results are looking good.  There was a slight increase in the cholesterol but it is overall doing ok.  Just watch the diet while traveling.  Please continue your current medications and doses.  Please feel free to contact me with any questions or concerns.    Sincerely,  Bernard Davalos  [email or web address]

## 2018-05-17 ENCOUNTER — PES CALL (OUTPATIENT)
Dept: ADMINISTRATIVE | Facility: CLINIC | Age: 73
End: 2018-05-17

## 2018-05-28 ENCOUNTER — OFFICE VISIT (OUTPATIENT)
Dept: FAMILY MEDICINE | Facility: CLINIC | Age: 73
End: 2018-05-28
Payer: MEDICARE

## 2018-05-28 ENCOUNTER — LAB VISIT (OUTPATIENT)
Dept: LAB | Facility: HOSPITAL | Age: 73
End: 2018-05-28
Attending: INTERNAL MEDICINE
Payer: MEDICARE

## 2018-05-28 VITALS
TEMPERATURE: 98 F | HEART RATE: 53 BPM | BODY MASS INDEX: 27.62 KG/M2 | HEIGHT: 67 IN | DIASTOLIC BLOOD PRESSURE: 64 MMHG | SYSTOLIC BLOOD PRESSURE: 122 MMHG | OXYGEN SATURATION: 98 % | WEIGHT: 176 LBS

## 2018-05-28 DIAGNOSIS — I10 ESSENTIAL HYPERTENSION: Primary | ICD-10-CM

## 2018-05-28 DIAGNOSIS — N40.1 BENIGN NON-NODULAR PROSTATIC HYPERPLASIA WITH LOWER URINARY TRACT SYMPTOMS: Chronic | ICD-10-CM

## 2018-05-28 DIAGNOSIS — M65.341 TRIGGER FINGER, RIGHT RING FINGER: ICD-10-CM

## 2018-05-28 DIAGNOSIS — E78.5 HYPERLIPIDEMIA, UNSPECIFIED HYPERLIPIDEMIA TYPE: ICD-10-CM

## 2018-05-28 DIAGNOSIS — Z12.11 ENCOUNTER FOR FIT (FECAL IMMUNOCHEMICAL TEST) SCREENING: ICD-10-CM

## 2018-05-28 DIAGNOSIS — L25.9 CONTACT DERMATITIS, UNSPECIFIED CONTACT DERMATITIS TYPE, UNSPECIFIED TRIGGER: ICD-10-CM

## 2018-05-28 DIAGNOSIS — I10 ESSENTIAL HYPERTENSION: ICD-10-CM

## 2018-05-28 LAB
ALBUMIN SERPL BCP-MCNC: 3.7 G/DL
ALP SERPL-CCNC: 56 U/L
ALT SERPL W/O P-5'-P-CCNC: 21 U/L
ANION GAP SERPL CALC-SCNC: 8 MMOL/L
AST SERPL-CCNC: 14 U/L
BASOPHILS # BLD AUTO: 0.07 K/UL
BASOPHILS NFR BLD: 1 %
BILIRUB SERPL-MCNC: 0.8 MG/DL
BUN SERPL-MCNC: 16 MG/DL
CALCIUM SERPL-MCNC: 9.3 MG/DL
CHLORIDE SERPL-SCNC: 104 MMOL/L
CHOLEST SERPL-MCNC: 163 MG/DL
CHOLEST/HDLC SERPL: 2.8 {RATIO}
CO2 SERPL-SCNC: 28 MMOL/L
CREAT SERPL-MCNC: 1 MG/DL
DIFFERENTIAL METHOD: NORMAL
EOSINOPHIL # BLD AUTO: 0.3 K/UL
EOSINOPHIL NFR BLD: 3.5 %
ERYTHROCYTE [DISTWIDTH] IN BLOOD BY AUTOMATED COUNT: 13.5 %
EST. GFR  (AFRICAN AMERICAN): >60 ML/MIN/1.73 M^2
EST. GFR  (NON AFRICAN AMERICAN): >60 ML/MIN/1.73 M^2
GLUCOSE SERPL-MCNC: 97 MG/DL
HCT VFR BLD AUTO: 43.3 %
HDLC SERPL-MCNC: 59 MG/DL
HDLC SERPL: 36.2 %
HGB BLD-MCNC: 14.3 G/DL
IMM GRANULOCYTES # BLD AUTO: 0.02 K/UL
IMM GRANULOCYTES NFR BLD AUTO: 0.3 %
LDLC SERPL CALC-MCNC: 85.6 MG/DL
LYMPHOCYTES # BLD AUTO: 1.5 K/UL
LYMPHOCYTES NFR BLD: 20.4 %
MCH RBC QN AUTO: 29.1 PG
MCHC RBC AUTO-ENTMCNC: 33 G/DL
MCV RBC AUTO: 88 FL
MONOCYTES # BLD AUTO: 0.8 K/UL
MONOCYTES NFR BLD: 10.9 %
NEUTROPHILS # BLD AUTO: 4.6 K/UL
NEUTROPHILS NFR BLD: 63.9 %
NONHDLC SERPL-MCNC: 104 MG/DL
NRBC BLD-RTO: 0 /100 WBC
PLATELET # BLD AUTO: 283 K/UL
PMV BLD AUTO: 11.9 FL
POTASSIUM SERPL-SCNC: 3.9 MMOL/L
PROT SERPL-MCNC: 6.7 G/DL
RBC # BLD AUTO: 4.91 M/UL
SODIUM SERPL-SCNC: 140 MMOL/L
TRIGL SERPL-MCNC: 92 MG/DL
WBC # BLD AUTO: 7.15 K/UL

## 2018-05-28 PROCEDURE — 99214 OFFICE O/P EST MOD 30 MIN: CPT | Mod: S$PBB,,, | Performed by: INTERNAL MEDICINE

## 2018-05-28 PROCEDURE — 99999 PR PBB SHADOW E&M-EST. PATIENT-LVL III: CPT | Mod: PBBFAC,,, | Performed by: INTERNAL MEDICINE

## 2018-05-28 PROCEDURE — 36415 COLL VENOUS BLD VENIPUNCTURE: CPT | Mod: PO

## 2018-05-28 PROCEDURE — 80061 LIPID PANEL: CPT

## 2018-05-28 PROCEDURE — 99213 OFFICE O/P EST LOW 20 MIN: CPT | Mod: PBBFAC,PO | Performed by: INTERNAL MEDICINE

## 2018-05-28 PROCEDURE — 85025 COMPLETE CBC W/AUTO DIFF WBC: CPT

## 2018-05-28 PROCEDURE — 80053 COMPREHEN METABOLIC PANEL: CPT

## 2018-05-28 RX ORDER — PRAVASTATIN SODIUM 20 MG/1
20 TABLET ORAL DAILY
Qty: 90 TABLET | Refills: 3 | Status: SHIPPED | OUTPATIENT
Start: 2018-05-28 | End: 2019-05-01 | Stop reason: SDUPTHER

## 2018-05-28 RX ORDER — TAMSULOSIN HYDROCHLORIDE 0.4 MG/1
0.4 CAPSULE ORAL DAILY
Qty: 90 CAPSULE | Refills: 3 | Status: SHIPPED | OUTPATIENT
Start: 2018-05-28 | End: 2019-05-01 | Stop reason: SDUPTHER

## 2018-05-28 RX ORDER — LISINOPRIL AND HYDROCHLOROTHIAZIDE 10; 12.5 MG/1; MG/1
1 TABLET ORAL DAILY
Qty: 90 TABLET | Refills: 3 | Status: SHIPPED | OUTPATIENT
Start: 2018-05-28 | End: 2019-05-01 | Stop reason: SDUPTHER

## 2018-05-28 NOTE — PROGRESS NOTES
Your lab results are normal.  Please continue your current medications and doses.  Please feel free to contact me with any questions or concerns.    Sincerely,  Bernard Davalos  [email or web address]

## 2018-05-28 NOTE — PROGRESS NOTES
Assessment & Plan  Problem List Items Addressed This Visit        Cardiac/Vascular    Essential hypertension - Primary (Chronic)    Current Assessment & Plan     The current medical regimen is effective;  continue present plan and medications.          Relevant Medications    lisinopril-hydrochlorothiazide (PRINZIDE,ZESTORETIC) 10-12.5 mg per tablet    Other Relevant Orders    Comprehensive metabolic panel    CBC auto differential    Lipid panel    Hyperlipidemia (Chronic)    Current Assessment & Plan     The current medical regimen is effective;  continue present plan and medications.          Relevant Medications    pravastatin (PRAVACHOL) 20 MG tablet       Renal/    Benign non-nodular prostatic hyperplasia with lower urinary tract symptoms (Chronic)    Current Assessment & Plan     The current medical regimen is effective;  continue present plan and medications.           Relevant Medications    tamsulosin (FLOMAX) 0.4 mg Cp24      Other Visit Diagnoses     Encounter for FIT (fecal immunochemical test) screening    -  Screen with FitKit    Relevant Orders    Fecal Immunochemical Test (iFOBT)    Trigger finger, right ring finger    -  Not limiting ADLs or causing pain.  Monitor         Contact dermatitis - Monitor.  Followed by dermatologist.  States he will sched fo/u as needed    Health Maintenance reviewed, as above.    Follow-up: Follow-up in about 1 year (around 5/28/2019) for Routine Physical.    ______________________________________________________________________    Chief Complaint  Chief Complaint   Patient presents with    Hypertension    Hyperlipidemia    Follow-up       HPI  Johnathan Waters is a 73 y.o. male with multiple medical diagnoses as listed in the medical history and problem list that presents for HTN HLD follow up.  Pt is known to me with his last appointment 06/2017.      He has been taking and tolerating his medications without perceived side effects.  No CP, SOB, palpitations,  myalgias.  He has been having some trigger finger in the 4th finger on the right hand.  Not limiting any of his activities.  Seemed to become a bigger issue after recent heavier yard work.      He would like to have his scripts printed.       PAST MEDICAL HISTORY:  Past Medical History:   Diagnosis Date    Elevated cholesterol     Hypertension     Squamous cell carcinoma     under right eye.  Sees outside Dermatology       PAST SURGICAL HISTORY:  Past Surgical History:   Procedure Laterality Date    VASECTOMY         SOCIAL HISTORY:  Social History     Social History    Marital status:      Spouse name: N/A    Number of children: N/A    Years of education: N/A     Occupational History    Not on file.     Social History Main Topics    Smoking status: Never Smoker    Smokeless tobacco: Never Used    Alcohol use No    Drug use: No    Sexual activity: Yes     Partners: Female      Comment:      Other Topics Concern    Not on file     Social History Narrative    No narrative on file       FAMILY HISTORY:  Family History   Problem Relation Age of Onset    Hypertension Mother     Cancer Father         lung    Diabetes Sister        ALLERGIES AND MEDICATIONS: updated and reviewed.  Review of patient's allergies indicates:   Allergen Reactions    Bee sting [allergen ext-venom-honey bee]     Sulfa (sulfonamide antibiotics) Other (See Comments)     Patient unsure     Current Outpatient Prescriptions   Medication Sig Dispense Refill    aspirin (ECOTRIN) 81 MG EC tablet Take 81 mg by mouth once daily.      clindamycin (CLEOCIN T) 1 % external solution Apply topically as needed. 60 mL 0    IBUPROFEN ORAL Take by mouth.      lisinopril-hydrochlorothiazide (PRINZIDE,ZESTORETIC) 10-12.5 mg per tablet Take 1 tablet by mouth once daily. 90 tablet 3    pravastatin (PRAVACHOL) 20 MG tablet Take 1 tablet (20 mg total) by mouth once daily. 90 tablet 3    tamsulosin (FLOMAX) 0.4 mg Cp24 Take 1  "capsule (0.4 mg total) by mouth once daily. 90 capsule 3    ciprofloxacin HCl (CIPRO) 500 MG tablet Take 1 tablet (500 mg total) by mouth 2 (two) times daily. 12 tablet 0     No current facility-administered medications for this visit.          ROS  Review of Systems   Constitutional: Negative for chills, fever and unexpected weight change.   HENT: Negative for congestion, dental problem, ear pain, hearing loss, rhinorrhea, sore throat and trouble swallowing.    Eyes: Negative for discharge, redness and visual disturbance.   Respiratory: Negative for cough, chest tightness, shortness of breath and wheezing.    Cardiovascular: Negative for chest pain, palpitations and leg swelling.   Gastrointestinal: Negative for abdominal pain, constipation, diarrhea, nausea and vomiting.   Endocrine: Negative for polydipsia, polyphagia and polyuria.   Genitourinary: Negative for decreased urine volume, dysuria and hematuria.   Musculoskeletal: Negative for arthralgias and myalgias.   Skin: Positive for rash. Negative for color change.   Neurological: Negative for dizziness, weakness, light-headedness and headaches.   Psychiatric/Behavioral: Negative for decreased concentration, dysphoric mood, sleep disturbance and suicidal ideas.           Physical Exam  Vitals:    05/28/18 0935   BP: 122/64   Pulse: (!) 53   Temp: 98.3 °F (36.8 °C)   SpO2: 98%   Weight: 79.8 kg (176 lb)   Height: 5' 7" (1.702 m)    Body mass index is 27.57 kg/m².  Weight: 79.8 kg (176 lb)   Height: 5' 7" (170.2 cm)   Physical Exam   Constitutional: He is oriented to person, place, and time. He appears well-developed and well-nourished. No distress.   HENT:   Head: Normocephalic and atraumatic.   Right Ear: Tympanic membrane, external ear and ear canal normal.   Left Ear: Tympanic membrane, external ear and ear canal normal.   Nose: Nose normal. No septal deviation. Right sinus exhibits no maxillary sinus tenderness and no frontal sinus tenderness. Left sinus " exhibits no maxillary sinus tenderness and no frontal sinus tenderness.   Mouth/Throat: Uvula is midline, oropharynx is clear and moist and mucous membranes are normal. No tonsillar exudate.   Eyes: Conjunctivae and EOM are normal. Pupils are equal, round, and reactive to light. Right eye exhibits no discharge. Left eye exhibits no discharge. No scleral icterus.   Neck: Neck supple. No JVD present. No spinous process tenderness and no muscular tenderness present. Carotid bruit is not present. No tracheal deviation present. No thyroid mass and no thyromegaly present.   Cardiovascular: Normal rate, regular rhythm, normal heart sounds and intact distal pulses.  Exam reveals no S3, no S4 and no friction rub.    No murmur heard.  Pulmonary/Chest: Effort normal and breath sounds normal. He has no wheezes. He has no rhonchi. He has no rales.   Abdominal: Soft. Bowel sounds are normal. He exhibits no distension. There is no tenderness. There is no rebound, no guarding and no CVA tenderness.   Musculoskeletal: Normal range of motion. He exhibits no edema or tenderness.   Lymphadenopathy:        Head (right side): No submental and no submandibular adenopathy present.        Head (left side): No submental and no submandibular adenopathy present.     He has no cervical adenopathy.   Neurological: He is alert and oriented to person, place, and time. Coordination normal.   Motor grossly intact.  Sensation grossly normal.  Symmetric facial movements palate elevated symmetrically tongue midline    Skin: Skin is warm and dry. Capillary refill takes less than 2 seconds. Rash (1cm area of contact dermatitis on the left forearm) noted. No cyanosis. Nails show no clubbing.   Psychiatric: He has a normal mood and affect. His speech is normal and behavior is normal. Thought content normal. Cognition and memory are normal.         Health Maintenance       Date Due Completion Date    Fecal Occult Blood Test (FOBT)/FitKit 1945 ---     Influenza Vaccine 08/01/2018 10/30/2017    Lipid Panel 06/01/2022 6/1/2017    TETANUS VACCINE 12/26/2024 12/26/2014

## 2018-06-07 ENCOUNTER — LAB VISIT (OUTPATIENT)
Dept: LAB | Facility: HOSPITAL | Age: 73
End: 2018-06-07
Attending: INTERNAL MEDICINE
Payer: MEDICARE

## 2018-06-07 DIAGNOSIS — Z12.11 ENCOUNTER FOR FIT (FECAL IMMUNOCHEMICAL TEST) SCREENING: ICD-10-CM

## 2018-06-07 LAB — HEMOCCULT STL QL IA: NEGATIVE

## 2018-06-07 PROCEDURE — 82274 ASSAY TEST FOR BLOOD FECAL: CPT

## 2018-06-07 NOTE — PROGRESS NOTES
Your stool results are normal.  Please continue your current medications and doses.  Please feel free to contact me with any questions or concerns.    Sincerely,  Bernard Davalos  [email or web address]

## 2018-07-02 ENCOUNTER — OFFICE VISIT (OUTPATIENT)
Dept: URGENT CARE | Facility: CLINIC | Age: 73
End: 2018-07-02
Payer: MEDICARE

## 2018-07-02 VITALS
HEIGHT: 67 IN | TEMPERATURE: 98 F | RESPIRATION RATE: 16 BRPM | HEART RATE: 55 BPM | DIASTOLIC BLOOD PRESSURE: 75 MMHG | SYSTOLIC BLOOD PRESSURE: 141 MMHG | OXYGEN SATURATION: 98 % | BODY MASS INDEX: 27.62 KG/M2 | WEIGHT: 176 LBS

## 2018-07-02 DIAGNOSIS — T63.441A BEE STING, ACCIDENTAL OR UNINTENTIONAL, INITIAL ENCOUNTER: Primary | ICD-10-CM

## 2018-07-02 PROCEDURE — 96372 THER/PROPH/DIAG INJ SC/IM: CPT | Mod: S$GLB,,, | Performed by: NURSE PRACTITIONER

## 2018-07-02 PROCEDURE — 99214 OFFICE O/P EST MOD 30 MIN: CPT | Mod: 25,S$GLB,, | Performed by: NURSE PRACTITIONER

## 2018-07-02 RX ORDER — METHYLPREDNISOLONE 4 MG/1
TABLET ORAL
Qty: 1 PACKAGE | Refills: 0 | Status: SHIPPED | OUTPATIENT
Start: 2018-07-02 | End: 2018-07-18

## 2018-07-02 RX ORDER — TRIAMCINOLONE ACETONIDE 1 MG/G
OINTMENT TOPICAL 2 TIMES DAILY
Qty: 80 G | Refills: 0 | Status: SHIPPED | OUTPATIENT
Start: 2018-07-02 | End: 2018-07-18

## 2018-07-02 RX ORDER — BETAMETHASONE SODIUM PHOSPHATE AND BETAMETHASONE ACETATE 3; 3 MG/ML; MG/ML
6 INJECTION, SUSPENSION INTRA-ARTICULAR; INTRALESIONAL; INTRAMUSCULAR; SOFT TISSUE ONCE
Status: COMPLETED | OUTPATIENT
Start: 2018-07-02 | End: 2018-07-02

## 2018-07-02 RX ADMIN — BETAMETHASONE SODIUM PHOSPHATE AND BETAMETHASONE ACETATE 6 MG: 3; 3 INJECTION, SUSPENSION INTRA-ARTICULAR; INTRALESIONAL; INTRAMUSCULAR; SOFT TISSUE at 05:07

## 2018-07-02 NOTE — PROGRESS NOTES
"Subjective:       Patient ID: Johnathan Waters is a 73 y.o. male.    Vitals:  height is 5' 7" (1.702 m) and weight is 79.8 kg (176 lb). His temperature is 97.6 °F (36.4 °C). His blood pressure is 141/75 (abnormal) and his pulse is 55 (abnormal). His respiration is 16 and oxygen saturation is 98%.     Chief Complaint: Insect Bite    Location: RIGHT proximal posterior forearm.    Pt reports wasp sting      Insect Bite   This is a new problem. The current episode started yesterday. The problem occurs constantly. The problem has been unchanged. Associated symptoms include a rash. Pertinent negatives include no chills, fever, nausea, sore throat, vomiting or weakness.     Review of Systems   Constitution: Negative for chills, fever, weakness, malaise/fatigue, night sweats, weight gain and weight loss.   HENT: Negative for sore throat.    Respiratory: Negative for shortness of breath.    Skin: Positive for itching and rash. Negative for color change, dry skin, flushing, nail changes, poor wound healing, skin cancer, suspicious lesions and unusual hair distribution.   Musculoskeletal: Negative for joint pain.   Gastrointestinal: Negative for nausea and vomiting.       Objective:      Physical Exam   Constitutional: He is oriented to person, place, and time. He appears well-developed and well-nourished.   HENT:   Head: Normocephalic and atraumatic. Head is without abrasion, without contusion and without laceration.   Right Ear: External ear normal.   Left Ear: External ear normal.   Nose: Nose normal.   Mouth/Throat: Oropharynx is clear and moist.   Eyes: Conjunctivae, EOM and lids are normal. Pupils are equal, round, and reactive to light.   Neck: Trachea normal, full passive range of motion without pain and phonation normal. Neck supple.   Cardiovascular: Normal rate, regular rhythm and normal heart sounds.    Pulmonary/Chest: Effort normal and breath sounds normal. No stridor. No respiratory distress. "   Musculoskeletal: Normal range of motion.   Neurological: He is alert and oriented to person, place, and time.   Skin: Skin is warm, dry and intact. Capillary refill takes less than 2 seconds. Rash noted. No abrasion, no bruising, no burn, no ecchymosis, no laceration and no lesion noted. Rash is maculopapular. There is erythema.        Psychiatric: He has a normal mood and affect. His speech is normal and behavior is normal. Judgment and thought content normal. Cognition and memory are normal.   Nursing note and vitals reviewed.      Assessment:       1. Bee sting, accidental or unintentional, initial encounter        Plan:         Bee sting, accidental or unintentional, initial encounter  -     betamethasone acetate-betamethasone sodium phosphate injection 6 mg; Inject 1 mL (6 mg total) into the muscle once.  -     triamcinolone acetonide 0.1% (KENALOG) 0.1 % ointment; Apply topically 2 (two) times daily.  Dispense: 80 g; Refill: 0  -     methylPREDNISolone (MEDROL, FIORELLA,) 4 mg tablet; Take the all the pills on the 1st row on day 1 with a meal.  On day 2 take all the pills on the second row of the package and so on.  Dispense: 1 Package; Refill: 0      Patient Instructions       Insect, Spider, and Scorpion Bites and Stings  Most insect bites are harmless and cause only minor swelling or itching. But if youre allergic to insects such as wasps or bees, a sting can cause a life-threatening allergic reaction. Some ticks can carry and transmit serious diseases. The venom (poison) from scorpions and certain spiders can also be deadly, although this is rare. Knowing when to seek emergency care could save your life.     The black  (top) and brown recluse (bottom) are two poisonous spiders found in the [place].   When to go to the emergency room (ER)  · Scorpion sting  · Bite from a black, red, or brown  spider or brown recluse spider  · Severe pain or swelling at the site of bite  · A tick that is  "embedded in your skin and can not be easily removed at home  · Signs of an allergic reaction such as:  ¨ Hives  ¨ Swelling of your eyes, lips, or the inside of your throat  ¨ Trouble breathing  ¨ Dizziness or confusion  What to expect in the ER  · If youre having trouble breathing, youll be given oxygen through a mask. In case of severe breathing difficulty, you may have a tube inserted in your throat and be placed on a ventilator (breathing machine).  · If you are having a severe allergic reaction from a sting (called anaphylaxis), you may be given a shot of epinephrine. If it is known that you are allergic to bee or wasp stings, your doctor may give you a prescription for an "epi-pen" that you can keep with you at all times in case of a sting.  · You may receive antivenin (a substance that reverses the effects of poison) for some spider bites and scorpion stings. Because antivenin can sometimes cause other problems, your doctor will weigh the risks and benefits of this treatment.  · Steroids such as prednisone are often used to treat allergic reactions. In many cases, your doctor will also prescribe an antihistamine to help relieve itching.  Easing symptoms of an insect bite or sting  · Try to remove a stinger you can see. Use your fingernail, a knife edge, or credit card to scrape against the skin. Do not squeeze or pull.  · Apply ice or a cold compress to reduce pain and swelling (keep a thin cloth between the cold source and the skin).   Date Last Reviewed: 12/1/2016  © 2520-7416 [employer]. [address] [place] [address], [place] [address]. All rights reserved. This information is not intended as a substitute for professional medical care. Always follow your healthcare professional's instructions.        Insect Sting Allergy, Generalized  You are having an allergic reaction to an insect sting. This may occur after a sting by a wasp, honeybee, yellow jacket, or other insect. This may cause an itchy " rash and swelling in the face or other parts of the body. A more severe reaction may cause you to feel dizzy, faint, or have trouble breathing or swallowing. Other warning signs are listed below.  Symptoms can include:  · Rash, hives, redness, welts, or blisters in areas other than the sting site  · Itching, burning, stinging, pain in areas other than the sting site  · Dry, flaky, cracking, scaly skin  · Swelling in areas other than the sting site   · Stomach pain or cramps  More severe symptoms include:  · Swelling of the face or lips or drooling  · Trouble swallowing, feeling like your throat is closing  · Trouble breathing, wheezing  · Dizziness or a sudden decrease in blood pressure  · Hoarse voice or trouble speaking  · Severe nausea, vomiting, or diarrhea  · Feeling faint or lightheaded  · Rapid heart rate  Home care  Medicine  The healthcare provider may prescribe medicines to relieve swelling, itching, and pain. Follow the providers instructions when taking these medicines.  · If you had a severe reaction, the provider may prescribe an injectable epinephrine kit. Epinephrine will stop the progression of an allergic reaction. Before you leave the hospital, be sure that you understand when and how to use this medicine.  · Oral diphenhydramine is an over-the-counter antihistamine available at pharmacies and grocery stores. Unless a prescription antihistamine was given, diphenhydramine may be used to reduce itching if large areas of the skin are involved. It may make you sleepy, so be careful using it in the daytime or when going to school, working, or driving. Note: Dont use diphenhydramine if you have glaucoma or if you are a man with trouble urinating due to an enlarged prostate. There are other antihistamines that cause less drowsiness and are good choices for daytime use. Ask your pharmacist for suggestions.  · Dont use diphenhydramine cream on your skin. It can cause a further reaction in some  people.  · Calamine lotion or oatmeal baths sometimes help with itching.  · You may use acetaminophen or ibuprofen to control pain, unless another pain medicine was prescribed. Note: If you have chronic liver or kidney disease or ever had a stomach ulcer or gastrointestinal bleeding, talk with your provider before using these medicines.    General care  Avoid tight clothing and things that heat up your skin (such as hot showers or baths, or direct sunlight). Heat makes the itching worse.  An ice pack will relieve local areas of intense itching and redness. Apply 5 to 10 minutes. To make an ice pack, put ice cubes in a plastic bag that seals at the top. Wrap the bag in a clean, thin towel or cloth. Dont put ice directly on the skin.  Ticks  If you try to remove a tick, do the following:  · Use a set of fine tweezers and  the tick as close to the skin as is possible.  · Pull upwards, using even, steady pressure. Dont jerk or twist the tick. The ticks bodily fluids may contain infection-causing organisms. So dont squeeze, crush, or puncture the body of the tick. Dont use a smoldering match or cigarette, nail polish, petroleum jelly, liquid soap, or kerosene. They may irritate the tick.  · If any mouthparts of the tick remain in the skin, these can be removed with tweezers. If you cant remove the mouth (of a tick) easily with clean tweezers, leave it alone and let the skin heal.  · After the tick is removed, wash the bite area with rubbing alcohol, iodine, or soap and water.  · Put the tick in a sealed container and completely cover it with alcohol. Never try to kill or crush a tick with your hand or fingers.  Stings  Wasps, yellow jackets, and hornets dont leave a stinger behind. But if a honeybee stings you, a stinger may stay in your skin. The stinger of a honeybee releases a substance that will attract other bees to you. So try to move away from the nest immediately. Once you are away from the nest, then  remove the stinger as quickly as possible by:  · Scraping the stinger out with the edge of a dull knife or plastic card (credit card).  · Don't use a tweezer or your fingers to remove the stinger since that may squeeze more toxin from the stinger.  · Wash the affected area with soap and warm water 2 to 3 times a day. Don't break a blister, if present.  · Next apply an ice pack for 5 to 10 minutes. To make an ice pack, put ice cubes in a plastic bag that seals at the top. Wrap the bag in a clean, thin towel or cloth. Dont put ice directly on the skin.  · Contact your healthcare provider and ask what can be used to help decrease the swelling and itching to the affected area.   · To prevent an infection, don't scratch the affected areas. Always check the sting area for signs of an infection: increased redness, swelling, or pain to the affected area.  Preventing future reactions  Future reactions could be worse than this one. So try to avoid situations where you might be stung:  · Don't walk in grass without shoes. Avoid wearing sandals.  · Don't leave food uncovered when eating outside. Sweet treats, watermelon, and ice cream attract insects.  · Don't drink from uncovered sweetened drinks in cans when outside. Insects are attracted to soda drink cans and sometimes crawl inside of them.  · Don't wear bright colored clothes with flowery prints and patterns when outside.  · Dont wear perfume when outside. Smell attracts insects.  · Wear long pants, long-sleeved shirts, socks, and work gloves when working outside.  · Be aware that honeybees nest in trees. Wasps and yellow jackets nest in the ground, trees or roof eaves. Avoid garbage cans when outside.  Auto-injectable epinephrine  · If you are at high risk for another sting due to where you work or play, or if your reaction included dizziness, fainting or trouble breathing or swallowing, an auto-injectable epinephrine may be prescribed. If not, ask your healthcare  provider for one and always carry it with you. Learn how to use the device. If you begin to feel the symptoms of another reaction in the future, use the auto-injectable epinephrine to inject yourself, and then call 911. Don't wait until symptoms become severe.   · Remember that the auto-injectable epinephrine is a rescue medicine only. You still need someone to take you to the hospital or call 911 after you have received the medicine.  Follow-up care  Follow up with your healthcare provider, or as advised if your symptoms do not continue to improve.  Call 911  Call 911 if any of these occur:  · Trouble breathing or swallowing, wheezing   · Cool, moist, pale skin  · Hoarse voice or trouble speaking  · Confused  · Very drowsy or trouble waking up  · Fainting or loss of consciousness  · Rapid heart rate  · Low blood pressure or feeling dizzy or weak  · Feeling of doom  · Severe nausea, vomiting, or diarrhea  · Seizure  · Swelling in the face, eyelids, lips, mouth, throat or tongue  · Drooling  When to seek medical advice  Call your healthcare provider right away if any of the following occur:  · Spreading areas of itching, redness or swelling  · Headache, fever, chills, muscle or joint aching  · Increased pain or swelling  · Signs of infection of the affected area:  ¨ Spreading redness  ¨ Increase in pain or swelling  ¨ Fluid or colored drainage from the affected site  Date Last Reviewed: 3/1/2017  © 1928-7169 [employer]. [address] [place] [address]. All rights reserved. This information is not intended as a substitute for professional medical care. Always follow your healthcare professional's instructions.      -As we discussed you received a steroid shot here today.  -You may start the medrol dose pack tomorrow.   -Continue the anti-histamine at home.  -Apply the topical steroid cream to the affected area.  Please follow up with your Primary care provider within 2-5 days if your signs and  symptoms have not resolved or worsen.     If your condition worsens or fails to improve we recommend that you receive another evaluation at the emergency room immediately or contact your primary medical clinic to discuss your concerns.   You must understand that you have received an Urgent Care treatment only and that you may be released before all of your medical problems are known or treated. You, the patient, will arrange for follow up care as instructed.

## 2018-07-02 NOTE — PATIENT INSTRUCTIONS
"  Insect, Spider, and Scorpion Bites and Stings  Most insect bites are harmless and cause only minor swelling or itching. But if youre allergic to insects such as wasps or bees, a sting can cause a life-threatening allergic reaction. Some ticks can carry and transmit serious diseases. The venom (poison) from scorpions and certain spiders can also be deadly, although this is rare. Knowing when to seek emergency care could save your life.     The black  (top) and brown recluse (bottom) are two poisonous spiders found in the [place].   When to go to the emergency room (ER)  · Scorpion sting  · Bite from a black, red, or brown  spider or brown recluse spider  · Severe pain or swelling at the site of bite  · A tick that is embedded in your skin and can not be easily removed at home  · Signs of an allergic reaction such as:  ¨ Hives  ¨ Swelling of your eyes, lips, or the inside of your throat  ¨ Trouble breathing  ¨ Dizziness or confusion  What to expect in the ER  · If youre having trouble breathing, youll be given oxygen through a mask. In case of severe breathing difficulty, you may have a tube inserted in your throat and be placed on a ventilator (breathing machine).  · If you are having a severe allergic reaction from a sting (called anaphylaxis), you may be given a shot of epinephrine. If it is known that you are allergic to bee or wasp stings, your doctor may give you a prescription for an "epi-pen" that you can keep with you at all times in case of a sting.  · You may receive antivenin (a substance that reverses the effects of poison) for some spider bites and scorpion stings. Because antivenin can sometimes cause other problems, your doctor will weigh the risks and benefits of this treatment.  · Steroids such as prednisone are often used to treat allergic reactions. In many cases, your doctor will also prescribe an antihistamine to help relieve itching.  Easing symptoms of an insect bite or " sting  · Try to remove a stinger you can see. Use your fingernail, a knife edge, or credit card to scrape against the skin. Do not squeeze or pull.  · Apply ice or a cold compress to reduce pain and swelling (keep a thin cloth between the cold source and the skin).   Date Last Reviewed: 12/1/2016 © 2000-2017 [employer]. [address] [place] [address], [place] [address]. All rights reserved. This information is not intended as a substitute for professional medical care. Always follow your healthcare professional's instructions.        Insect Sting Allergy, Generalized  You are having an allergic reaction to an insect sting. This may occur after a sting by a wasp, honeybee, yellow jacket, or other insect. This may cause an itchy rash and swelling in the face or other parts of the body. A more severe reaction may cause you to feel dizzy, faint, or have trouble breathing or swallowing. Other warning signs are listed below.  Symptoms can include:  · Rash, hives, redness, welts, or blisters in areas other than the sting site  · Itching, burning, stinging, pain in areas other than the sting site  · Dry, flaky, cracking, scaly skin  · Swelling in areas other than the sting site   · Stomach pain or cramps  More severe symptoms include:  · Swelling of the face or lips or drooling  · Trouble swallowing, feeling like your throat is closing  · Trouble breathing, wheezing  · Dizziness or a sudden decrease in blood pressure  · Hoarse voice or trouble speaking  · Severe nausea, vomiting, or diarrhea  · Feeling faint or lightheaded  · Rapid heart rate  Home care  Medicine  The healthcare provider may prescribe medicines to relieve swelling, itching, and pain. Follow the providers instructions when taking these medicines.  · If you had a severe reaction, the provider may prescribe an injectable epinephrine kit. Epinephrine will stop the progression of an allergic reaction. Before you leave the hospital, be sure that you  understand when and how to use this medicine.  · Oral diphenhydramine is an over-the-counter antihistamine available at pharmacies and grocery stores. Unless a prescription antihistamine was given, diphenhydramine may be used to reduce itching if large areas of the skin are involved. It may make you sleepy, so be careful using it in the daytime or when going to school, working, or driving. Note: Dont use diphenhydramine if you have glaucoma or if you are a man with trouble urinating due to an enlarged prostate. There are other antihistamines that cause less drowsiness and are good choices for daytime use. Ask your pharmacist for suggestions.  · Dont use diphenhydramine cream on your skin. It can cause a further reaction in some people.  · Calamine lotion or oatmeal baths sometimes help with itching.  · You may use acetaminophen or ibuprofen to control pain, unless another pain medicine was prescribed. Note: If you have chronic liver or kidney disease or ever had a stomach ulcer or gastrointestinal bleeding, talk with your provider before using these medicines.    General care  Avoid tight clothing and things that heat up your skin (such as hot showers or baths, or direct sunlight). Heat makes the itching worse.  An ice pack will relieve local areas of intense itching and redness. Apply 5 to 10 minutes. To make an ice pack, put ice cubes in a plastic bag that seals at the top. Wrap the bag in a clean, thin towel or cloth. Dont put ice directly on the skin.  Ticks  If you try to remove a tick, do the following:  · Use a set of fine tweezers and  the tick as close to the skin as is possible.  · Pull upwards, using even, steady pressure. Dont jerk or twist the tick. The ticks bodily fluids may contain infection-causing organisms. So dont squeeze, crush, or puncture the body of the tick. Dont use a smoldering match or cigarette, nail polish, petroleum jelly, liquid soap, or kerosene. They may irritate the  tick.  · If any mouthparts of the tick remain in the skin, these can be removed with tweezers. If you cant remove the mouth (of a tick) easily with clean tweezers, leave it alone and let the skin heal.  · After the tick is removed, wash the bite area with rubbing alcohol, iodine, or soap and water.  · Put the tick in a sealed container and completely cover it with alcohol. Never try to kill or crush a tick with your hand or fingers.  Stings  Wasps, yellow jackets, and hornets dont leave a stinger behind. But if a honeybee stings you, a stinger may stay in your skin. The stinger of a honeybee releases a substance that will attract other bees to you. So try to move away from the nest immediately. Once you are away from the nest, then remove the stinger as quickly as possible by:  · Scraping the stinger out with the edge of a dull knife or plastic card (credit card).  · Don't use a tweezer or your fingers to remove the stinger since that may squeeze more toxin from the stinger.  · Wash the affected area with soap and warm water 2 to 3 times a day. Don't break a blister, if present.  · Next apply an ice pack for 5 to 10 minutes. To make an ice pack, put ice cubes in a plastic bag that seals at the top. Wrap the bag in a clean, thin towel or cloth. Dont put ice directly on the skin.  · Contact your healthcare provider and ask what can be used to help decrease the swelling and itching to the affected area.   · To prevent an infection, don't scratch the affected areas. Always check the sting area for signs of an infection: increased redness, swelling, or pain to the affected area.  Preventing future reactions  Future reactions could be worse than this one. So try to avoid situations where you might be stung:  · Don't walk in grass without shoes. Avoid wearing sandals.  · Don't leave food uncovered when eating outside. Sweet treats, watermelon, and ice cream attract insects.  · Don't drink from uncovered sweetened drinks  in cans when outside. Insects are attracted to soda drink cans and sometimes crawl inside of them.  · Don't wear bright colored clothes with flowery prints and patterns when outside.  · Dont wear perfume when outside. Smell attracts insects.  · Wear long pants, long-sleeved shirts, socks, and work gloves when working outside.  · Be aware that honeybees nest in trees. Wasps and yellow jackets nest in the ground, trees or roof eaves. Avoid garbage cans when outside.  Auto-injectable epinephrine  · If you are at high risk for another sting due to where you work or play, or if your reaction included dizziness, fainting or trouble breathing or swallowing, an auto-injectable epinephrine may be prescribed. If not, ask your healthcare provider for one and always carry it with you. Learn how to use the device. If you begin to feel the symptoms of another reaction in the future, use the auto-injectable epinephrine to inject yourself, and then call 911. Don't wait until symptoms become severe.   · Remember that the auto-injectable epinephrine is a rescue medicine only. You still need someone to take you to the hospital or call 911 after you have received the medicine.  Follow-up care  Follow up with your healthcare provider, or as advised if your symptoms do not continue to improve.  Call 911  Call 911 if any of these occur:  · Trouble breathing or swallowing, wheezing   · Cool, moist, pale skin  · Hoarse voice or trouble speaking  · Confused  · Very drowsy or trouble waking up  · Fainting or loss of consciousness  · Rapid heart rate  · Low blood pressure or feeling dizzy or weak  · Feeling of doom  · Severe nausea, vomiting, or diarrhea  · Seizure  · Swelling in the face, eyelids, lips, mouth, throat or tongue  · Drooling  When to seek medical advice  Call your healthcare provider right away if any of the following occur:  · Spreading areas of itching, redness or swelling  · Headache, fever, chills, muscle or joint  aching  · Increased pain or swelling  · Signs of infection of the affected area:  ¨ Spreading redness  ¨ Increase in pain or swelling  ¨ Fluid or colored drainage from the affected site  Date Last Reviewed: 3/1/2017  © 8507-4880 [employer]. [address] [place] [address], [place] [address]. All rights reserved. This information is not intended as a substitute for professional medical care. Always follow your healthcare professional's instructions.      -As we discussed you received a steroid shot here today.  -You may start the medrol dose pack tomorrow.   -Continue the anti-histamine at home.  -Apply the topical steroid cream to the affected area.  Please follow up with your Primary care provider within 2-5 days if your signs and symptoms have not resolved or worsen.     If your condition worsens or fails to improve we recommend that you receive another evaluation at the emergency room immediately or contact your primary medical clinic to discuss your concerns.   You must understand that you have received an Urgent Care treatment only and that you may be released before all of your medical problems are known or treated. You, the patient, will arrange for follow up care as instructed.

## 2018-07-18 ENCOUNTER — TELEPHONE (OUTPATIENT)
Dept: FAMILY MEDICINE | Facility: CLINIC | Age: 73
End: 2018-07-18

## 2018-07-18 ENCOUNTER — OFFICE VISIT (OUTPATIENT)
Dept: FAMILY MEDICINE | Facility: CLINIC | Age: 73
End: 2018-07-18
Payer: MEDICARE

## 2018-07-18 VITALS
HEART RATE: 56 BPM | OXYGEN SATURATION: 97 % | HEIGHT: 67 IN | SYSTOLIC BLOOD PRESSURE: 102 MMHG | DIASTOLIC BLOOD PRESSURE: 58 MMHG | WEIGHT: 175 LBS | BODY MASS INDEX: 27.47 KG/M2 | TEMPERATURE: 98 F

## 2018-07-18 DIAGNOSIS — I10 ESSENTIAL HYPERTENSION: Chronic | ICD-10-CM

## 2018-07-18 DIAGNOSIS — E78.5 HYPERLIPIDEMIA, UNSPECIFIED HYPERLIPIDEMIA TYPE: Chronic | ICD-10-CM

## 2018-07-18 DIAGNOSIS — I49.9 IRREGULAR HEART BEAT: Primary | ICD-10-CM

## 2018-07-18 PROCEDURE — 99214 OFFICE O/P EST MOD 30 MIN: CPT | Mod: S$PBB,,, | Performed by: NURSE PRACTITIONER

## 2018-07-18 PROCEDURE — 93010 ELECTROCARDIOGRAM REPORT: CPT | Mod: ,,, | Performed by: INTERNAL MEDICINE

## 2018-07-18 PROCEDURE — 93005 ELECTROCARDIOGRAM TRACING: CPT | Mod: PBBFAC,PO | Performed by: NURSE PRACTITIONER

## 2018-07-18 PROCEDURE — 99999 PR PBB SHADOW E&M-EST. PATIENT-LVL IV: CPT | Mod: PBBFAC,,, | Performed by: NURSE PRACTITIONER

## 2018-07-18 PROCEDURE — 99214 OFFICE O/P EST MOD 30 MIN: CPT | Mod: PBBFAC,PO,25 | Performed by: NURSE PRACTITIONER

## 2018-07-18 NOTE — PROGRESS NOTES
"History of Present Illness   Johnathan Waters is a 73 y.o. man with medical history as listed below who presents today for evaluation of irregular heart beat, intermittent for two days. He reports that yesterday while relaxing after spin class, he noticed a sensation of his heart skipping a beat. He checked his pulse which was elevated for him in the 70's. The symptoms resolved after minutes. He states today he was sitting at his desk at work and developed similar symptoms. He again noted his heart rate to be elevated in the 70's with "skipping" sensation. This resolved again in minutes, and he has had no further episodes. He denies chest pain, palpitations, shortness of breath, diaphoresis, lightheadedness, or LOC associated. He does report regular exercise about 4-5 times per week. He reports a remote history of stress test about 25 years prior, and at that time he was told he had a few PVC's. He does reports drinking about three to four cups of coffee daily, no additional caffeine. He has no additional complaints and is otherwise healthy on today's visit.    Past Medical History:   Diagnosis Date    Elevated cholesterol     Hypertension     Squamous cell carcinoma     under right eye.  Sees outside Dermatology       Past Surgical History:   Procedure Laterality Date    VASECTOMY         Social History     Social History    Marital status:      Spouse name: N/A    Number of children: N/A    Years of education: N/A     Social History Main Topics    Smoking status: Never Smoker    Smokeless tobacco: Never Used    Alcohol use No    Drug use: No    Sexual activity: Yes     Partners: Female      Comment:      Other Topics Concern    None     Social History Narrative    None       Family History   Problem Relation Age of Onset    Hypertension Mother     Cancer Father         lung    Diabetes Sister        Review of Systems  Review of Systems   Eyes: Negative for blurred vision and " "double vision.   Respiratory: Negative for shortness of breath.    Cardiovascular: Negative for chest pain, palpitations, orthopnea and leg swelling.   Neurological: Negative for dizziness, tingling, sensory change, focal weakness and loss of consciousness.     A complete review of systems was otherwise negative.    Physical Exam  BP (!) 102/58 (BP Location: Right arm, Patient Position: Sitting, BP Method: Medium (Manual))   Pulse (!) 56   Temp 98.4 °F (36.9 °C) (Oral)   Ht 5' 7" (1.702 m)   Wt 79.4 kg (175 lb)   SpO2 97%   BMI 27.41 kg/m²   General appearance: alert, appears stated age, cooperative and no distress  Eyes: negative findings: lids and lashes normal, conjunctivae and sclerae normal and pupils equal, round, reactive to light and accomodation  Neck: no carotid bruit, no JVD and supple, symmetrical, trachea midline  Lungs: clear to auscultation bilaterally  Heart: regular rate and rhythm, S1, S2 normal, no murmur, click, rub or gallop  Extremities: extremities normal, atraumatic, no cyanosis or edema  Pulses: 2+ and symmetric  Skin: Skin color, texture, turgor normal. No rashes or lesions  Neurologic: Grossly normal, no focal neurological deficits     Assessment/Plan  Irregular heart beat  EKG shows sinus bradycardia with no ST or T-wave abnormality. Recent labs were unremarkable.  I recommend a holter monitor to rule out PAC, PVC, atrial fibrillation/flutter, or other arhythmia to explain his symptoms.  Stay hydrated, limit caffeine, monitor for S&S occurring during work out or at rest.  I have also placed cardiology referral to discuss further work-up with stress test.  ER precautions discussed, no red flags on exam today.  RTC PRN.  -     IN OFFICE EKG 12-LEAD (to Muse)  -     Holter monitor - 24 hour; Future  -     Ambulatory referral to Cardiology    Essential hypertension  The current medical regimen is effective;  continue present plan and medications.  At goal today.    Hyperlipidemia, " unspecified hyperlipidemia type  The current medical regimen is effective;  continue present plan and medications.    He has verbalized understanding and is in agreement with plan of care.    Follow-up if symptoms worsen or fail to improve.

## 2018-07-18 NOTE — TELEPHONE ENCOUNTER
"Patient reports noticing his heart "missing a beat" on yesterday evening while sitting An hour of cycling HR a little higher than normal  "

## 2018-07-20 ENCOUNTER — PATIENT MESSAGE (OUTPATIENT)
Dept: FAMILY MEDICINE | Facility: CLINIC | Age: 73
End: 2018-07-20

## 2018-07-20 ENCOUNTER — TELEPHONE (OUTPATIENT)
Dept: FAMILY MEDICINE | Facility: CLINIC | Age: 73
End: 2018-07-20

## 2018-07-20 DIAGNOSIS — I49.9 IRREGULAR HEART BEAT: Primary | ICD-10-CM

## 2018-07-20 NOTE — TELEPHONE ENCOUNTER
Pt states he will call back to schedule his cardiology appointment after the holter monitor appointment. Thanks.

## 2018-07-27 ENCOUNTER — HOSPITAL ENCOUNTER (OUTPATIENT)
Dept: CARDIOLOGY | Facility: HOSPITAL | Age: 73
Discharge: HOME | End: 2018-07-27
Attending: NURSE PRACTITIONER
Payer: MEDICARE

## 2018-07-27 DIAGNOSIS — I49.9 IRREGULAR HEART BEAT: ICD-10-CM

## 2018-07-27 PROCEDURE — 93227 XTRNL ECG REC<48 HR R&I: CPT | Mod: ,,, | Performed by: INTERNAL MEDICINE

## 2018-07-27 PROCEDURE — 93226 XTRNL ECG REC<48 HR SCAN A/R: CPT

## 2018-07-30 ENCOUNTER — PATIENT MESSAGE (OUTPATIENT)
Dept: FAMILY MEDICINE | Facility: CLINIC | Age: 73
End: 2018-07-30

## 2018-08-13 ENCOUNTER — OFFICE VISIT (OUTPATIENT)
Dept: CARDIOLOGY | Facility: CLINIC | Age: 73
End: 2018-08-13
Payer: MEDICARE

## 2018-08-13 VITALS
DIASTOLIC BLOOD PRESSURE: 78 MMHG | BODY MASS INDEX: 27.78 KG/M2 | HEIGHT: 67 IN | SYSTOLIC BLOOD PRESSURE: 142 MMHG | HEART RATE: 64 BPM | OXYGEN SATURATION: 98 % | WEIGHT: 177 LBS | RESPIRATION RATE: 15 BRPM

## 2018-08-13 DIAGNOSIS — E78.2 MIXED HYPERLIPIDEMIA: ICD-10-CM

## 2018-08-13 DIAGNOSIS — R00.2 PALPITATIONS: ICD-10-CM

## 2018-08-13 DIAGNOSIS — I10 ESSENTIAL HYPERTENSION: ICD-10-CM

## 2018-08-13 DIAGNOSIS — I49.9 IRREGULAR HEART BEAT: Primary | ICD-10-CM

## 2018-08-13 PROCEDURE — 99213 OFFICE O/P EST LOW 20 MIN: CPT | Mod: PBBFAC | Performed by: INTERNAL MEDICINE

## 2018-08-13 PROCEDURE — 99204 OFFICE O/P NEW MOD 45 MIN: CPT | Mod: S$PBB,,, | Performed by: INTERNAL MEDICINE

## 2018-08-13 PROCEDURE — 99999 PR PBB SHADOW E&M-EST. PATIENT-LVL III: CPT | Mod: PBBFAC,,, | Performed by: INTERNAL MEDICINE

## 2018-08-13 NOTE — PROGRESS NOTES
Subjective:    Patient ID:  Johnathan Waters is a 73 y.o. male who presents for evaluation of Results (pt states he have results from an holter montior that he would like explain to him )      HPI  Patient is here for evaluation of irregular heartbeat and palpitation.  He says monos more frequently extra heart skip beat which can occur at rest and with exercise.  He says he does extensive cardio including spinning classes without any issues.  He denies any chest pain per Se knowing his shortness of breath with heavy activity relieved with rest.  He has had no sustained tachycardia.  He denies any PND, orthopnea or lower edema. He has experiencing dizziness, presyncope or syncope.    Review of Systems   Constitution: Negative.   HENT: Negative.    Eyes: Negative.    Cardiovascular: Positive for irregular heartbeat and palpitations. Negative for chest pain, dyspnea on exertion, leg swelling, near-syncope, orthopnea, paroxysmal nocturnal dyspnea and syncope.   Respiratory: Negative for shortness of breath.    Skin: Negative.    Musculoskeletal: Negative.    Gastrointestinal: Negative for abdominal pain, constipation and diarrhea.   Genitourinary: Negative for dysuria.   Neurological: Negative for dizziness.   Psychiatric/Behavioral: Negative.      Past Medical History:   Diagnosis Date    Elevated cholesterol     Hypertension     Squamous cell carcinoma     under right eye.  Sees outside Dermatology     Past Surgical History:   Procedure Laterality Date    VASECTOMY       Social History     Tobacco Use    Smoking status: Never Smoker    Smokeless tobacco: Never Used   Substance Use Topics    Alcohol use: No    Drug use: No     Family History   Problem Relation Age of Onset    Hypertension Mother     Cancer Father         lung    Diabetes Sister         Objective:    Physical Exam   Constitutional: He is oriented to person, place, and time. He appears well-developed and well-nourished. No distress.   HENT:    Head: Normocephalic and atraumatic.   Eyes: Conjunctivae and EOM are normal. Pupils are equal, round, and reactive to light.   Neck: Normal range of motion. Neck supple. No thyromegaly present.   Cardiovascular: Normal rate, regular rhythm and normal heart sounds.   No murmur heard.  Pulmonary/Chest: Effort normal and breath sounds normal. No respiratory distress. He has no wheezes. He has no rales. He exhibits no tenderness.   Abdominal: Soft. Bowel sounds are normal.   Musculoskeletal: He exhibits no edema.   Neurological: He is alert and oriented to person, place, and time.   Skin: Skin is warm and dry.   Psychiatric: He has a normal mood and affect. His behavior is normal.       ekg sinus bradycardia    Holter: 7-18  Fairly within normal limits apart from sinus bradycardia and rare ectopy, nonspecific EAT noted    LDL-86   5-18    Assessment:       1. Irregular heart beat    2. Palpitations    3. Essential hypertension    4. Mixed hyperlipidemia         Plan:       -several risk factors with current symptoms, plan for stress echo    Return to clinic in 1 month with testing now

## 2018-08-20 ENCOUNTER — HOSPITAL ENCOUNTER (OUTPATIENT)
Dept: CARDIOLOGY | Facility: HOSPITAL | Age: 73
Discharge: HOME | End: 2018-08-20
Attending: INTERNAL MEDICINE
Payer: MEDICARE

## 2018-08-20 DIAGNOSIS — I49.9 IRREGULAR HEART BEAT: ICD-10-CM

## 2018-08-20 LAB
AORTIC VALVE REGURGITATION: NORMAL
ESTIMATED PA SYSTOLIC PRESSURE: 31.94
MITRAL VALVE REGURGITATION: NORMAL
RETIRED EF AND QEF - SEE NOTES: 55 (ref 55–65)
TRICUSPID VALVE REGURGITATION: NORMAL

## 2018-08-20 PROCEDURE — 93320 DOPPLER ECHO COMPLETE: CPT | Mod: 26,,, | Performed by: INTERNAL MEDICINE

## 2018-08-20 PROCEDURE — 93320 DOPPLER ECHO COMPLETE: CPT

## 2018-08-20 PROCEDURE — 93325 DOPPLER ECHO COLOR FLOW MAPG: CPT | Mod: 26,,, | Performed by: INTERNAL MEDICINE

## 2018-08-20 PROCEDURE — 93351 STRESS TTE COMPLETE: CPT | Mod: 26,,, | Performed by: INTERNAL MEDICINE

## 2018-09-11 ENCOUNTER — OFFICE VISIT (OUTPATIENT)
Dept: CARDIOLOGY | Facility: CLINIC | Age: 73
End: 2018-09-11
Payer: MEDICARE

## 2018-09-11 VITALS
DIASTOLIC BLOOD PRESSURE: 72 MMHG | WEIGHT: 177 LBS | SYSTOLIC BLOOD PRESSURE: 118 MMHG | HEART RATE: 87 BPM | RESPIRATION RATE: 20 BRPM | OXYGEN SATURATION: 97 % | BODY MASS INDEX: 27.73 KG/M2

## 2018-09-11 DIAGNOSIS — E78.2 MIXED HYPERLIPIDEMIA: ICD-10-CM

## 2018-09-11 DIAGNOSIS — I10 ESSENTIAL HYPERTENSION: ICD-10-CM

## 2018-09-11 DIAGNOSIS — R00.2 PALPITATIONS: ICD-10-CM

## 2018-09-11 DIAGNOSIS — I49.9 IRREGULAR HEART BEAT: Primary | ICD-10-CM

## 2018-09-11 PROCEDURE — 99213 OFFICE O/P EST LOW 20 MIN: CPT | Mod: PBBFAC | Performed by: INTERNAL MEDICINE

## 2018-09-11 PROCEDURE — 99999 PR PBB SHADOW E&M-EST. PATIENT-LVL III: CPT | Mod: PBBFAC,,, | Performed by: INTERNAL MEDICINE

## 2018-09-11 PROCEDURE — 99214 OFFICE O/P EST MOD 30 MIN: CPT | Mod: S$PBB,,, | Performed by: INTERNAL MEDICINE

## 2018-09-11 NOTE — PROGRESS NOTES
Subjective:    Patient ID:  Johnathan Waters is a 73 y.o. male who presents for follow-up of Results      HPI  Here for follow-up of palpitations.  He mostly experienced irregular heartbeats and underwent diagnostic testing as below.  This showed excellent exercise tolerance on stress echo which structurally normal heart.  He had no significant issues on Holter monitoring other than average bradycardic heart rate.  He still is actively doing spinning classes.  He denies any worsening cardiopulmonary complaints.  We discussed avoidance of stimulants any does drink 3 cups of coffee in the morning.  We discussed possibly decreasing consumption.  He denies any PND, orthopnea or lower extremity edema. He has not experiencing dizziness, presyncope or syncope.      Review of Systems   Constitution: Negative.   HENT: Negative.    Eyes: Negative.    Cardiovascular: Negative for chest pain, dyspnea on exertion, irregular heartbeat, leg swelling, near-syncope, orthopnea, palpitations, paroxysmal nocturnal dyspnea and syncope.   Respiratory: Negative for shortness of breath.    Skin: Negative.    Musculoskeletal: Negative.    Gastrointestinal: Negative for abdominal pain, constipation and diarrhea.   Genitourinary: Negative for dysuria.   Neurological: Negative for dizziness.   Psychiatric/Behavioral: Negative.         Objective:    Physical Exam   Constitutional: He is oriented to person, place, and time. He appears well-developed and well-nourished. No distress.   HENT:   Head: Normocephalic and atraumatic.   Eyes: Conjunctivae and EOM are normal. Pupils are equal, round, and reactive to light.   Neck: Normal range of motion. Neck supple. No thyromegaly present.   Cardiovascular: Normal rate, regular rhythm and normal heart sounds.   No murmur heard.  Pulmonary/Chest: Effort normal and breath sounds normal. No respiratory distress. He has no wheezes. He has no rales. He exhibits no tenderness.   Abdominal: Soft. Bowel  sounds are normal.   Musculoskeletal: He exhibits no edema.   Neurological: He is alert and oriented to person, place, and time.   Skin: Skin is warm and dry.   Psychiatric: He has a normal mood and affect. His behavior is normal.         GUERO:  8-18  CONCLUSIONS     1 - Normal left ventricular systolic function (EF 55-60%).     2 - Concentric remodeling.     3 - Mild left atrial enlargement.     No evidence of stress induced myocardial ischemia.   * ran for over 10 min without symptoms    Holter within normal limits apart from average heart rate 56 beats per minute    Assessment:       1. Irregular heart beat    2. Palpitations    3. Essential hypertension    4. Mixed hyperlipidemia         Plan:       -mainly reassurance from CV standpoint  -counseled on avoidance of stimulants I caffeine etc  -continue without restrictions exercise    Return to clinic in 6 months

## 2018-09-13 ENCOUNTER — TELEPHONE (OUTPATIENT)
Dept: FAMILY MEDICINE | Facility: CLINIC | Age: 73
End: 2018-09-13

## 2018-09-13 NOTE — TELEPHONE ENCOUNTER
----- Message from Wilfredo Jim sent at 9/13/2018  1:34 PM CDT -----  Contact: Self/[phone]  The patient is requesting to have an appointment for eye surgery clearance on October 5th. The patient would like someone to contact him in regards to this matter.    Thank you

## 2018-10-05 ENCOUNTER — OFFICE VISIT (OUTPATIENT)
Dept: FAMILY MEDICINE | Facility: CLINIC | Age: 73
End: 2018-10-05
Payer: MEDICARE

## 2018-10-05 VITALS
HEIGHT: 67 IN | OXYGEN SATURATION: 97 % | BODY MASS INDEX: 27.55 KG/M2 | HEART RATE: 50 BPM | DIASTOLIC BLOOD PRESSURE: 70 MMHG | WEIGHT: 175.5 LBS | SYSTOLIC BLOOD PRESSURE: 116 MMHG | TEMPERATURE: 98 F

## 2018-10-05 DIAGNOSIS — Z01.818 PRE-OPERATIVE CLEARANCE: Primary | ICD-10-CM

## 2018-10-05 DIAGNOSIS — M65.341 TRIGGER RING FINGER OF RIGHT HAND: ICD-10-CM

## 2018-10-05 DIAGNOSIS — H26.9 CATARACT, UNSPECIFIED CATARACT TYPE, UNSPECIFIED LATERALITY: ICD-10-CM

## 2018-10-05 PROCEDURE — 99214 OFFICE O/P EST MOD 30 MIN: CPT | Mod: PBBFAC,PO | Performed by: NURSE PRACTITIONER

## 2018-10-05 PROCEDURE — 99214 OFFICE O/P EST MOD 30 MIN: CPT | Mod: S$PBB,,, | Performed by: NURSE PRACTITIONER

## 2018-10-05 PROCEDURE — 99999 PR PBB SHADOW E&M-EST. PATIENT-LVL IV: CPT | Mod: PBBFAC,,, | Performed by: NURSE PRACTITIONER

## 2018-10-05 RX ORDER — OFLOXACIN 3 MG/ML
SOLUTION/ DROPS OPHTHALMIC
COMMUNITY
Start: 2018-10-04 | End: 2019-05-01

## 2018-10-05 RX ORDER — PREDNISOLONE ACETATE 10 MG/ML
SUSPENSION/ DROPS OPHTHALMIC
COMMUNITY
Start: 2018-10-04 | End: 2019-05-01

## 2018-10-05 RX ORDER — BROMFENAC SODIUM 0.7 MG/ML
SOLUTION/ DROPS OPHTHALMIC
COMMUNITY
Start: 2018-10-04 | End: 2019-05-01

## 2018-10-05 NOTE — PROGRESS NOTES
Subjective:       Patient ID: Johnathan Waters is a 73 y.o. male.    Chief Complaint: Pre-op Exam (cataracts surgery)     Johnathan Waters is a 73 y.o. male who presents to the office today for a preoperative consultation at the request of surgeon Dr Lelo Falk of [facility]  who plans on performing cataract removal on October 16. This consultation is requested for the specific conditions prompting preoperative evaluation (i.e. because of potential affect on operative risk): will be discussed by surgery  team. Planned anesthesia: Monitored. The patient has the following known anesthesia issues: None. Patients bleeding risk: no recent abnormal bleeding and no remote history of abnormal bleeding. Patient does not have objections to receiving blood products if needed.          Review of Systems   Constitutional: Negative for chills, diaphoresis, fatigue and fever.   HENT: Negative for congestion, postnasal drip, rhinorrhea, sinus pressure and sneezing.    Eyes: Positive for visual disturbance. Negative for photophobia, pain, discharge, redness and itching.   Respiratory: Negative for cough, chest tightness and shortness of breath.    Cardiovascular: Negative for chest pain, palpitations and leg swelling.   Gastrointestinal: Negative for abdominal pain, diarrhea, nausea and vomiting.   Genitourinary: Negative for decreased urine volume and difficulty urinating.   Musculoskeletal: Negative for arthralgias, back pain and myalgias.   Skin: Negative for color change and rash.   Neurological: Negative for dizziness, weakness, light-headedness and headaches.       Objective:      Physical Exam   Constitutional: He is oriented to person, place, and time. Vital signs are normal. He appears well-developed and well-nourished.   HENT:   Head: Normocephalic and atraumatic.   Right Ear: External ear normal.   Left Ear: External ear normal.   Nose: Nose normal.   Mouth/Throat: Oropharynx is clear and moist. No  oropharyngeal exudate.   Cardiovascular: Normal rate, regular rhythm and normal heart sounds.   Pulmonary/Chest: Effort normal and breath sounds normal.   Abdominal: Soft. Bowel sounds are normal.   Neurological: He is alert and oriented to person, place, and time.   Skin: Skin is warm, dry and intact.   Psychiatric: He has a normal mood and affect.       Assessment:       1. Pre-operative clearance    2. Cataract, unspecified cataract type, unspecified laterality        Plan:       Johnathan Manning was seen today for pre-op exam.    Diagnoses and all orders for this visit:    Pre-operative clearance  NO lab work needed       Cataract, unspecified cataract type, unspecified laterality  The current medical regimen is effective;  continue present plan and medications.    I have evaluated the patient for risks associated with the planned anesthesia and the procedure to be performed and have found the patient an appropriate candidate

## 2018-10-05 NOTE — PATIENT INSTRUCTIONS
Treating Trigger Finger     The tendon sheath is opened to release the tendon. Once the tendon can move freely again, the finger can bend and straighten more normally.     Trigger finger occurs when the tissue inside your finger or thumb becomes inflamed. Mild cases can be treated without surgery. If the problem is severe, surgery may be needed. Your doctor will discuss your options with you.  Nonsurgical treatment  For mild symptoms, your doctor may have you rest the finger or thumb. You may also be told to take anti-inflammatory medicines. These include ibuprofen or aspirin. You may be given an injection of medicine in the base of the finger or thumb. This typically is a steroid, such as cortisone.  Surgery  If nonsurgical treatments dont ease your symptoms, surgery may be recommended. A tendon is a cordlike fiber that attaches muscle to bone and allows joints to bend. The tendon is surrounded by a protective cover called a sheath. During surgery, the sheath in your finger or thumb is opened to enlarge the space and release the swollen tendon. This allows the finger or thumb to bend and straighten normally. Surgery takes about 20 minutes. It can often be done using a local anesthetic. You may be able to go home the same day. Your hand will be wrapped in a soft bandage. You may need to wear a plaster splint for a short time to keep the finger or thumb still as it heals. The stitches will be removed in about 2 weeks. Your doctor can discuss the risks and benefits of surgery with you.  Date Last Reviewed: 9/21/2015 © 2000-2017 [employer]. [address] [place] [address], [place] [address]. All rights reserved. This information is not intended as a substitute for professional medical care. Always follow your healthcare professional's instructions.

## 2018-10-06 ENCOUNTER — OFFICE VISIT (OUTPATIENT)
Dept: URGENT CARE | Facility: CLINIC | Age: 73
End: 2018-10-06
Payer: MEDICARE

## 2018-10-06 VITALS
SYSTOLIC BLOOD PRESSURE: 102 MMHG | WEIGHT: 175 LBS | RESPIRATION RATE: 18 BRPM | BODY MASS INDEX: 27.47 KG/M2 | HEIGHT: 67 IN | OXYGEN SATURATION: 98 % | HEART RATE: 58 BPM | TEMPERATURE: 98 F | DIASTOLIC BLOOD PRESSURE: 59 MMHG

## 2018-10-06 DIAGNOSIS — Z91.030 BEE STING ALLERGY: Primary | ICD-10-CM

## 2018-10-06 PROCEDURE — 99214 OFFICE O/P EST MOD 30 MIN: CPT | Mod: 25,S$GLB,, | Performed by: NURSE PRACTITIONER

## 2018-10-06 PROCEDURE — 96372 THER/PROPH/DIAG INJ SC/IM: CPT | Mod: S$GLB,,, | Performed by: NURSE PRACTITIONER

## 2018-10-06 RX ORDER — TRIAMCINOLONE ACETONIDE 1 MG/G
CREAM TOPICAL 2 TIMES DAILY
Qty: 15 G | Refills: 0 | Status: SHIPPED | OUTPATIENT
Start: 2018-10-06 | End: 2019-01-04

## 2018-10-06 RX ORDER — METHYLPREDNISOLONE 4 MG/1
TABLET ORAL
Qty: 21 TABLET | Refills: 0 | Status: SHIPPED | OUTPATIENT
Start: 2018-10-06 | End: 2019-01-04

## 2018-10-06 RX ORDER — BETAMETHASONE SODIUM PHOSPHATE AND BETAMETHASONE ACETATE 3; 3 MG/ML; MG/ML
6 INJECTION, SUSPENSION INTRA-ARTICULAR; INTRALESIONAL; INTRAMUSCULAR; SOFT TISSUE
Status: COMPLETED | OUTPATIENT
Start: 2018-10-06 | End: 2018-10-06

## 2018-10-06 RX ADMIN — BETAMETHASONE SODIUM PHOSPHATE AND BETAMETHASONE ACETATE 6 MG: 3; 3 INJECTION, SUSPENSION INTRA-ARTICULAR; INTRALESIONAL; INTRAMUSCULAR; SOFT TISSUE at 03:10

## 2018-10-06 NOTE — PATIENT INSTRUCTIONS
Insect Sting: Local Reaction   You have been stung or bitten by an insect. The insects venom or body fluid is causing your skin to react in the area where you were stung or bitten. This often causes redness, itching and swelling. This reaction will fade over a few hours, but it can last a few days. An insect bite or sting can become infected 1 to 3 days later, so watch for the signs below. Sometimes it is hard to tell the difference between a local reaction to the insect bite or sting and an early infection, so you may be given antibiotics.  Common insect stings causing problems are from wasps, bees, yellow jackets, and hornets. Common bites are from spiders, mosquitoes, fleas, or ticks. Other types of insects may be more common in different parts of the country or world.  Most people think of allergic reactions when someone has a rash or itchy skin. Symptoms can include:  · Rash, hives, redness, welts, or blisters  · Itching, burning, stinging, or pain  · Swelling around the sting area.  Sometimes swelling spreads to other areas.  Home care  Medicines  The healthcare provider may prescribe medicines to relieve swelling, itching, and pain. Follow the providers instructions when taking these medicines.  · If you had a severe reaction, the provider may prescribe an epinephrine kit. Epinephrine will stop an allergic reaction from getting worse. Before you leave the hospital, be sure that you understand when and how to use this medicine.  · Diphenhydramine is an oral antihistamine available at drugstores and groceries. Unless a prescription antihistamine was given, you can use this medicine to reduce itching if large areas of the skin are involved. The medicine may make you sleepy, so be careful using it in the daytime or when going to school, working, or driving. Dont use diphenhydramine if you have glaucoma or if you are a man with trouble urinating because of an enlarged prostate. Other antihistamines cause less  drowsiness and are good choices for daytime use. Ask your pharmacist for suggestions.  · Dont use diphenhydramine cream on your skin. In some people it can cause additional reaction and make you allergic to this medicine.  · Calamine lotion or oatmeal baths sometimes help with itching.  · You may use acetaminophen or ibuprofen to control pain, unless another pain medicine was prescribed. Talk with your healthcare provider before using these medicines if you have chronic liver or kidney disease. Also talk with your provider if youve had a stomach ulcer or GI bleeding.  General care    · If itching is a problem, dont take hot showers or baths. Stay out of direct sunlight. These heat up your skin and will make the itching worse.  · Use an ice pack to reduce local areas of redness and itching. You can make your own ice pack by putting ice cubes in a bag that seals and wrapping it in a thin towel. Dont put the ice directly on your skin, because it can damage the skin.  · Try not to scratch any affected areas and damage the skin. This will help prevent an infection.  · If oral antibiotics were prescribed, be sure to take them until finished.  Preventing future reactions  · Future reactions could be worse than this one, so try to stay away from places where you might be stung again.  · Be aware that honeybees nest in trees. Wasps and yellow jackets nest in the ground, trees, or roof eaves.  · If you are stung by a honeybee, a stinger will remain in your skin. Wasps, yellow jackets, and hornets dont leave a stinger behind. Move away from the nest area right away. The stinger of a honeybee releases a substance that will attract other bees to you. Once you are away from the nest, then remove the stinger as quickly as possible.  · After any sting, you may apply ice and take diphenhydramine or another antihistamine. If you develop any of the warning signs below, seek help right away.  · If you are at high risk for another  sting, or if your reaction included dizziness, fainting, or trouble breathing or swallowing, ask your doctor for an insect allergy kit.  · Remove any ticks on the skin with a set of fine tweezers.  the tick as close to the skin as possible. Pull back gently but firmly. Use an even, steady pressure. Dont jerk or twist. Dont squeeze, crush, or puncture the body of the tick. The bodily fluids may contain infection-causing germs. Dont use a smoldering match or cigarette, nail polish, petroleum jelly, liquid soap, or kerosene. These may irritate the tick. If any mouthparts of the tick remain in the skin, these should be left alone. They will fall off on their own. Trying to remove these parts may damage the skin unless they can be removed very easily. After the tick is removed, wash the bite area with rubbing alcohol, iodine, or soap and water.  Follow-up care  Follow up with your doctor in 2 days, or as advised, if your symptoms dont start to get better.  Call 911  Call 911 if any of these occur:  · Trouble breathing or swallowing, or wheezing  · New or worsening swelling in the mouth, throat, or tongue  · Hoarse voice or trouble speaking  · Confused  · Very drowsy or trouble awakening  · Fainting or loss of consciousness  · Rapid heart rate  · Low blood pressure  · Feeling of doom  · Nausea, vomiting, abdominal pain, or diarrhea  · Vomiting blood, or large amounts of blood in stool  · Seizure  When to seek medical advice  Call your healthcare provider right away if any of these occur:  · Spreading areas of itching, redness or swelling  · New or worse swelling in the face, eyelids, or  lips  · Dizziness or weakness  Also call your provider right away if you have signs of infection:  · Spreading redness  · Increased pain or swelling  · Fever of 100.4°F (38°C) or higher, or as directed by your healthcare provider  · Colored fluid draining from the sting area   Date Last Reviewed: 10/1/2016  © 6282-1123 The  [employer]. [address] [place] [address], [place] [address]. All rights reserved. This information is not intended as a substitute for professional medical care. Always follow your healthcare professional's instructions.    Please return here or go to the Emergency Department for any concerns or worsening of condition.  If you were prescribed antibiotics, please take them to completion.  If you were prescribed a narcotic medication, do not drive or operate heavy equipment or machinery while taking these medications.  Please follow up with your primary care doctor or specialist as needed.    If you  smoke, please stop smoking.

## 2018-10-06 NOTE — PROGRESS NOTES
"Subjective:       Patient ID: Johnathan Waters is a 73 y.o. male.    Vitals:  height is 5' 7" (1.702 m) and weight is 79.4 kg (175 lb). His temperature is 98 °F (36.7 °C). His blood pressure is 102/59 (abnormal) and his pulse is 58 (abnormal). His respiration is 18 and oxygen saturation is 98%.     Chief Complaint: Insect Bite    Insect Bite   This is a new problem. The current episode started today. The problem occurs constantly. The problem has been unchanged. Pertinent negatives include no chills, fever, rash or sore throat. Nothing aggravates the symptoms. He has tried nothing for the symptoms. The treatment provided no relief.     Review of Systems   Constitution: Negative for chills and fever.   HENT: Negative for sore throat.    Respiratory: Negative for shortness of breath.    Skin: Negative for itching and rash.   Musculoskeletal: Negative for joint pain.   All other systems reviewed and are negative.      Objective:      Physical Exam   Constitutional: He is oriented to person, place, and time. He appears well-developed and well-nourished.   HENT:   Head: Normocephalic and atraumatic. Head is without abrasion, without contusion and without laceration.   Right Ear: External ear normal.   Left Ear: External ear normal.   Nose: Nose normal.   Mouth/Throat: Oropharynx is clear and moist.   Eyes: Conjunctivae, EOM and lids are normal. Pupils are equal, round, and reactive to light.   Neck: Trachea normal, full passive range of motion without pain and phonation normal. Neck supple.   Cardiovascular: Normal rate, regular rhythm and normal heart sounds.   Pulmonary/Chest: Effort normal and breath sounds normal. No stridor. No respiratory distress.   Musculoskeletal: Normal range of motion.   Neurological: He is alert and oriented to person, place, and time.   Skin: Skin is warm and dry. Capillary refill takes less than 2 seconds. Rash noted. No abrasion, no bruising, no burn, no ecchymosis, no laceration and no " lesion noted. Rash is urticarial. There is erythema.        Psychiatric: He has a normal mood and affect. His speech is normal and behavior is normal. Judgment and thought content normal. Cognition and memory are normal.   Nursing note and vitals reviewed.      Assessment:       1. Bee sting allergy        Plan:         Bee sting allergy    Other orders  -     methylPREDNISolone (MEDROL DOSEPACK) 4 mg tablet; use as directed  Dispense: 21 tablet; Refill: 0  -     betamethasone acetate-betamethasone sodium phosphate injection 6 mg; Inject 1 mL (6 mg total) into the muscle one time.  -     triamcinolone acetonide 0.1% (KENALOG) 0.1 % cream; Apply topically 2 (two) times daily. for 10 days  Dispense: 15 g; Refill: 0        Insect Sting: Local Reaction   You have been stung or bitten by an insect. The insects venom or body fluid is causing your skin to react in the area where you were stung or bitten. This often causes redness, itching and swelling. This reaction will fade over a few hours, but it can last a few days. An insect bite or sting can become infected 1 to 3 days later, so watch for the signs below. Sometimes it is hard to tell the difference between a local reaction to the insect bite or sting and an early infection, so you may be given antibiotics.  Common insect stings causing problems are from wasps, bees, yellow jackets, and hornets. Common bites are from spiders, mosquitoes, fleas, or ticks. Other types of insects may be more common in different parts of the country or world.  Most people think of allergic reactions when someone has a rash or itchy skin. Symptoms can include:  · Rash, hives, redness, welts, or blisters  · Itching, burning, stinging, or pain  · Swelling around the sting area.  Sometimes swelling spreads to other areas.  Home care  Medicines  The healthcare provider may prescribe medicines to relieve swelling, itching, and pain. Follow the providers instructions when taking these  medicines.  · If you had a severe reaction, the provider may prescribe an epinephrine kit. Epinephrine will stop an allergic reaction from getting worse. Before you leave the hospital, be sure that you understand when and how to use this medicine.  · Diphenhydramine is an oral antihistamine available at drugstores and groceries. Unless a prescription antihistamine was given, you can use this medicine to reduce itching if large areas of the skin are involved. The medicine may make you sleepy, so be careful using it in the daytime or when going to school, working, or driving. Dont use diphenhydramine if you have glaucoma or if you are a man with trouble urinating because of an enlarged prostate. Other antihistamines cause less drowsiness and are good choices for daytime use. Ask your pharmacist for suggestions.  · Dont use diphenhydramine cream on your skin. In some people it can cause additional reaction and make you allergic to this medicine.  · Calamine lotion or oatmeal baths sometimes help with itching.  · You may use acetaminophen or ibuprofen to control pain, unless another pain medicine was prescribed. Talk with your healthcare provider before using these medicines if you have chronic liver or kidney disease. Also talk with your provider if youve had a stomach ulcer or GI bleeding.  General care    · If itching is a problem, dont take hot showers or baths. Stay out of direct sunlight. These heat up your skin and will make the itching worse.  · Use an ice pack to reduce local areas of redness and itching. You can make your own ice pack by putting ice cubes in a bag that seals and wrapping it in a thin towel. Dont put the ice directly on your skin, because it can damage the skin.  · Try not to scratch any affected areas and damage the skin. This will help prevent an infection.  · If oral antibiotics were prescribed, be sure to take them until finished.  Preventing future reactions  · Future reactions could  be worse than this one, so try to stay away from places where you might be stung again.  · Be aware that honeybees nest in trees. Wasps and yellow jackets nest in the ground, trees, or roof eaves.  · If you are stung by a honeybee, a stinger will remain in your skin. Wasps, yellow jackets, and hornets dont leave a stinger behind. Move away from the nest area right away. The stinger of a honeybee releases a substance that will attract other bees to you. Once you are away from the nest, then remove the stinger as quickly as possible.  · After any sting, you may apply ice and take diphenhydramine or another antihistamine. If you develop any of the warning signs below, seek help right away.  · If you are at high risk for another sting, or if your reaction included dizziness, fainting, or trouble breathing or swallowing, ask your doctor for an insect allergy kit.  · Remove any ticks on the skin with a set of fine tweezers.  the tick as close to the skin as possible. Pull back gently but firmly. Use an even, steady pressure. Dont jerk or twist. Dont squeeze, crush, or puncture the body of the tick. The bodily fluids may contain infection-causing germs. Dont use a smoldering match or cigarette, nail polish, petroleum jelly, liquid soap, or kerosene. These may irritate the tick. If any mouthparts of the tick remain in the skin, these should be left alone. They will fall off on their own. Trying to remove these parts may damage the skin unless they can be removed very easily. After the tick is removed, wash the bite area with rubbing alcohol, iodine, or soap and water.  Follow-up care  Follow up with your doctor in 2 days, or as advised, if your symptoms dont start to get better.  Call 911  Call 911 if any of these occur:  · Trouble breathing or swallowing, or wheezing  · New or worsening swelling in the mouth, throat, or tongue  · Hoarse voice or trouble speaking  · Confused  · Very drowsy or trouble  awakening  · Fainting or loss of consciousness  · Rapid heart rate  · Low blood pressure  · Feeling of doom  · Nausea, vomiting, abdominal pain, or diarrhea  · Vomiting blood, or large amounts of blood in stool  · Seizure  When to seek medical advice  Call your healthcare provider right away if any of these occur:  · Spreading areas of itching, redness or swelling  · New or worse swelling in the face, eyelids, or  lips  · Dizziness or weakness  Also call your provider right away if you have signs of infection:  · Spreading redness  · Increased pain or swelling  · Fever of 100.4°F (38°C) or higher, or as directed by your healthcare provider  · Colored fluid draining from the sting area   Date Last Reviewed: 10/1/2016  © 8114-8459 [employer]. [address] [place] [address], [place] [address]. All rights reserved. This information is not intended as a substitute for professional medical care. Always follow your healthcare professional's instructions.    Please return here or go to the Emergency Department for any concerns or worsening of condition.  If you were prescribed antibiotics, please take them to completion.  If you were prescribed a narcotic medication, do not drive or operate heavy equipment or machinery while taking these medications.  Please follow up with your primary care doctor or specialist as needed.    If you  smoke, please stop smoking.

## 2018-10-16 ENCOUNTER — PATIENT MESSAGE (OUTPATIENT)
Dept: ADMINISTRATIVE | Facility: OTHER | Age: 73
End: 2018-10-16

## 2018-10-17 ENCOUNTER — PATIENT MESSAGE (OUTPATIENT)
Dept: ADMINISTRATIVE | Facility: OTHER | Age: 73
End: 2018-10-17

## 2018-11-06 ENCOUNTER — PATIENT OUTREACH (OUTPATIENT)
Dept: OTHER | Facility: OTHER | Age: 73
End: 2018-11-06

## 2018-11-06 NOTE — PROGRESS NOTES
Last 5 Patient Entered Readings                                      Current 30 Day Average: 130/73     Recent Readings 11/6/2018 11/5/2018 11/4/2018 11/3/2018 11/2/2018    SBP (mmHg) 131 124 138 119 118    DBP (mmHg) 74 68 71 66 70    Pulse 58 52 43 52 54        11/6-Enrollment completed. Patient requested BP cuff information bi-fold via personal email. Sent welcome letter.

## 2018-11-06 NOTE — LETTER
Yanelis Ashley PharmD  [address] [place] [address]     Dear Johnathan Waters,    Welcome to the [facility]!           My name is Yanelis Ashley PharmD and I am your dedicated Digital Medicine clinician.  As an expert in medication management, I will help ensure that the medications you are taking continue to provide you with the intended benefits.        I am Grisel Bowen and I will be your health  for the duration of the program.  My  job is to help you identify lifestyle changes to improve your blood pressure control.  We will talk about nutrition, exercise, and other ways that you may be able to adjust your current habits to better your health. Together, we will work to improve your overall health and encourage you to meet your goals for a healthier lifestyle.    What we expect from YOU:    You will need to take blood pressure readings multiple times a week and no less than one reading per week.   It is important that you take your measurements at different times during the day, when possible.     What you should expect from your Digital Medicine Care Team:   We will provide you with education about high blood pressure, including lifestyle changes that could help you to control your blood pressure.   We will review your weekly readings and provide you with monthly blood pressure progress reports after you have been in the program for more than 30 days.   We will send monthly progress reports on your blood pressure control to your physician so they can follow along with your progress as well.    You will be able to reach me by phone at  or through your [facility] account by clicking my name under Care Team on the right side of the home screen.    I look forward to working with you to achieve your blood pressure goals!    Sincerely,    Yanelis Ashley PharmD  Your personal clinician    Please visit [email or web address] to  learn more about high blood pressure and what you can do lower your blood pressure.                                                                                           Johnathan Waters  [address] [facility] [id number]

## 2019-01-04 ENCOUNTER — OFFICE VISIT (OUTPATIENT)
Dept: FAMILY MEDICINE | Facility: CLINIC | Age: 74
End: 2019-01-04
Payer: MEDICARE

## 2019-01-04 VITALS
SYSTOLIC BLOOD PRESSURE: 132 MMHG | TEMPERATURE: 98 F | OXYGEN SATURATION: 98 % | HEIGHT: 67 IN | HEART RATE: 65 BPM | WEIGHT: 179.38 LBS | BODY MASS INDEX: 28.16 KG/M2 | DIASTOLIC BLOOD PRESSURE: 68 MMHG

## 2019-01-04 DIAGNOSIS — I10 ESSENTIAL HYPERTENSION: Chronic | ICD-10-CM

## 2019-01-04 DIAGNOSIS — Z01.818 PRE-OPERATIVE EXAMINATION FOR INTERNAL MEDICINE: Primary | ICD-10-CM

## 2019-01-04 PROCEDURE — 99214 PR OFFICE/OUTPT VISIT, EST, LEVL IV, 30-39 MIN: ICD-10-PCS | Mod: S$PBB,,, | Performed by: INTERNAL MEDICINE

## 2019-01-04 PROCEDURE — 99999 PR PBB SHADOW E&M-EST. PATIENT-LVL III: CPT | Mod: PBBFAC,,, | Performed by: INTERNAL MEDICINE

## 2019-01-04 PROCEDURE — 99999 PR PBB SHADOW E&M-EST. PATIENT-LVL III: ICD-10-PCS | Mod: PBBFAC,,, | Performed by: INTERNAL MEDICINE

## 2019-01-04 PROCEDURE — 99213 OFFICE O/P EST LOW 20 MIN: CPT | Mod: PBBFAC,PO | Performed by: INTERNAL MEDICINE

## 2019-01-04 PROCEDURE — 99214 OFFICE O/P EST MOD 30 MIN: CPT | Mod: S$PBB,,, | Performed by: INTERNAL MEDICINE

## 2019-01-04 NOTE — PROGRESS NOTES
Assessment & Plan  Problem List Items Addressed This Visit        Cardiac/Vascular    Essential hypertension (Chronic)    Current Assessment & Plan     The current medical regimen is effective;  continue present plan and medications.            Other Visit Diagnoses     Pre-operative examination for internal medicine    -  Primary  -    Patient is planned for a low cardiac risk cataract extraction.  He can perform well over 4 function METs without any symptoms.  At this time he is medically maximized for his cataract surgery. Discussed potentially needing to stop his ASA 5-7 days prior to surgery.              Health Maintenance reviewed, up to date.    Follow-up: Follow-up in about 4 months (around 5/4/2019) for Routine Physical.    ______________________________________________________________________    Chief Complaint  Chief Complaint   Patient presents with    Pre-op Exam       HPI  Johnathan Waters is a 73 y.o. male with multiple medical diagnoses as listed in the medical history and problem list that presents for Pre-op evaluation for cataract surgery on the right eye in 3 weeks.  Pt is known to me with his last appointment 10/5/2018.      Had left eye done a couple of months ago.  No new CP, SOB, palpitations, leg swelling orthopnea.  He is riding a bike for an hour without any symptoms.  No dysuria or hematuria.  No history of complications from anesthesia.  His BP is well controlled today.          PAST MEDICAL HISTORY:  Past Medical History:   Diagnosis Date    Elevated cholesterol     Hypertension     Squamous cell carcinoma     under right eye.  Sees outside Dermatology       PAST SURGICAL HISTORY:  Past Surgical History:   Procedure Laterality Date    EYE SURGERY      VASECTOMY         SOCIAL HISTORY:  Social History     Socioeconomic History    Marital status:      Spouse name: Not on file    Number of children: Not on file    Years of education: Not on file    Highest education  level: Not on file   Social Needs    Financial resource strain: Not on file    Food insecurity - worry: Not on file    Food insecurity - inability: Not on file    Transportation needs - medical: Not on file    Transportation needs - non-medical: Not on file   Occupational History    Not on file   Tobacco Use    Smoking status: Never Smoker    Smokeless tobacco: Never Used   Substance and Sexual Activity    Alcohol use: No    Drug use: No    Sexual activity: Yes     Partners: Female     Comment:    Other Topics Concern    Not on file   Social History Narrative    Not on file       FAMILY HISTORY:  Family History   Problem Relation Age of Onset    Hypertension Mother     Cancer Father         lung    Diabetes Sister        ALLERGIES AND MEDICATIONS: updated and reviewed.  Review of patient's allergies indicates:   Allergen Reactions    Bee sting [allergen ext-venom-honey bee]     Sulfa (sulfonamide antibiotics) Other (See Comments)     Patient unsure     Current Outpatient Medications   Medication Sig Dispense Refill    aspirin (ECOTRIN) 81 MG EC tablet Take 81 mg by mouth once daily.      lisinopril-hydrochlorothiazide (PRINZIDE,ZESTORETIC) 10-12.5 mg per tablet Take 1 tablet by mouth once daily. 90 tablet 3    ofloxacin (OCUFLOX) 0.3 % ophthalmic solution       pravastatin (PRAVACHOL) 20 MG tablet Take 1 tablet (20 mg total) by mouth once daily. 90 tablet 3    prednisoLONE acetate (PRED FORTE) 1 % DrpS       PROLENSA 0.07 % Drop       tamsulosin (FLOMAX) 0.4 mg Cp24 Take 1 capsule (0.4 mg total) by mouth once daily. 90 capsule 3     No current facility-administered medications for this visit.          ROS  Review of Systems   Constitutional: Negative for activity change, chills, fever and unexpected weight change.   HENT: Negative for congestion, dental problem, ear pain, hearing loss, rhinorrhea, sore throat and trouble swallowing.    Eyes: Negative for discharge, redness and visual  "disturbance.   Respiratory: Negative for cough, chest tightness, shortness of breath and wheezing.    Cardiovascular: Negative for chest pain, palpitations and leg swelling.   Gastrointestinal: Negative for abdominal pain, blood in stool, constipation, diarrhea, nausea and vomiting.   Endocrine: Negative for polydipsia, polyphagia and polyuria.   Genitourinary: Negative for decreased urine volume, difficulty urinating, dysuria, hematuria and urgency.   Musculoskeletal: Negative for arthralgias, joint swelling, myalgias and neck pain.   Skin: Negative for color change and rash.   Neurological: Negative for dizziness, weakness, light-headedness and headaches.   Psychiatric/Behavioral: Negative for confusion, decreased concentration, dysphoric mood, sleep disturbance and suicidal ideas.           Physical Exam  Vitals:    01/04/19 0804 01/04/19 0835   BP: 130/70 132/68   Pulse: 65    Temp: 98.3 °F (36.8 °C)    SpO2: 98%    Weight: 81.4 kg (179 lb 5.5 oz)    Height: 5' 7" (1.702 m)     Body mass index is 28.09 kg/m².  Weight: 81.4 kg (179 lb 5.5 oz)   Height: 5' 7" (170.2 cm)   Physical Exam   Constitutional: He is oriented to person, place, and time. He appears well-developed and well-nourished. No distress.   HENT:   Head: Normocephalic and atraumatic.   Eyes: Conjunctivae, EOM and lids are normal. Pupils are equal, round, and reactive to light. No scleral icterus.   Neck: Full passive range of motion without pain. Neck supple. No JVD present. Carotid bruit is not present. No thyromegaly present.   Cardiovascular: Normal rate, regular rhythm, normal heart sounds and intact distal pulses. Exam reveals no S3, no S4 and no friction rub.   No murmur heard.  Pulmonary/Chest: Effort normal and breath sounds normal. He has no wheezes. He has no rhonchi. He has no rales.   Abdominal: Soft. Bowel sounds are normal. There is no tenderness.   Musculoskeletal: He exhibits no edema or tenderness.   Lymphadenopathy:        Head " (right side): No submental and no submandibular adenopathy present.        Head (left side): No submental and no submandibular adenopathy present.     He has no cervical adenopathy.        Right: No supraclavicular adenopathy present.        Left: No supraclavicular adenopathy present.   Neurological: He is alert and oriented to person, place, and time.   Motor grossly intact.  Sensory grossly intact.  Symmetric facial movements palate elevated symmetrically tongue midline   Skin: Skin is warm and dry. No rash noted.   Psychiatric: He has a normal mood and affect. His speech is normal and behavior is normal. Thought content normal.         Health Maintenance       Date Due Completion Date    Fecal Occult Blood Test (FOBT)/[employer] 06/07/2019 6/7/2018    Lipid Panel 05/28/2023 5/28/2018    TETANUS VACCINE 12/26/2024 12/26/2014

## 2019-01-08 ENCOUNTER — PATIENT OUTREACH (OUTPATIENT)
Dept: OTHER | Facility: OTHER | Age: 74
End: 2019-01-08

## 2019-01-08 NOTE — PROGRESS NOTES
Last 5 Patient Entered Readings                                      Current 30 Day Average: 133/70     Recent Readings 1/8/2019 1/5/2019 1/4/2019 1/4/2019 1/3/2019    SBP (mmHg) 146 116 154 153 139    DBP (mmHg) 78 69 78 85 79    Pulse 55 54 51 52 55        Hypertension Medications     lisinopril-hydrochlorothiazide (PRINZIDE,ZESTORETIC) 10-12.5 mg per tablet Take 1 tablet by mouth once daily.     Called patient for digital medicine clinical pharmacist introduction. Patient was not available to talk at this time and requested that I call next week.     Briefly informed patient of recent study linking increased risk of lung cancer with use of an ACE Inhibitor. Will consider switching to ARB.     Sammy FerreiraD.   Digital Medicine Clinical Pharmacist  [phone]

## 2019-01-12 ENCOUNTER — OFFICE VISIT (OUTPATIENT)
Dept: URGENT CARE | Facility: CLINIC | Age: 74
End: 2019-01-12
Payer: MEDICARE

## 2019-01-12 VITALS
RESPIRATION RATE: 17 BRPM | OXYGEN SATURATION: 95 % | SYSTOLIC BLOOD PRESSURE: 132 MMHG | WEIGHT: 172 LBS | HEART RATE: 80 BPM | TEMPERATURE: 98 F | DIASTOLIC BLOOD PRESSURE: 71 MMHG | HEIGHT: 67 IN | BODY MASS INDEX: 27 KG/M2

## 2019-01-12 DIAGNOSIS — J40 BRONCHITIS: ICD-10-CM

## 2019-01-12 DIAGNOSIS — R05.9 COUGH: Primary | ICD-10-CM

## 2019-01-12 LAB
CTP QC/QA: YES
FLUAV AG NPH QL: NEGATIVE
FLUBV AG NPH QL: NEGATIVE

## 2019-01-12 PROCEDURE — 99213 PR OFFICE/OUTPT VISIT, EST, LEVL III, 20-29 MIN: ICD-10-PCS | Mod: S$GLB,,, | Performed by: INTERNAL MEDICINE

## 2019-01-12 PROCEDURE — 87804 POCT INFLUENZA A/B: ICD-10-PCS | Mod: 59,QW,S$GLB, | Performed by: INTERNAL MEDICINE

## 2019-01-12 PROCEDURE — 99213 OFFICE O/P EST LOW 20 MIN: CPT | Mod: S$GLB,,, | Performed by: INTERNAL MEDICINE

## 2019-01-12 PROCEDURE — 87804 INFLUENZA ASSAY W/OPTIC: CPT | Mod: QW,S$GLB,, | Performed by: INTERNAL MEDICINE

## 2019-01-12 NOTE — PROGRESS NOTES
Subjective:       Patient ID: Johnathan Waters is a 73 y.o. male.    Vitals:  respiration is 17.     Chief Complaint: Cough    HPI    Constitution: Positive for fever. Negative for chills, sweating and fatigue.   HENT: Positive for sore throat. Negative for ear pain, congestion, sinus pain, sinus pressure and voice change.    Neck: Negative for painful lymph nodes.   Eyes: Negative for eye redness.   Respiratory: Positive for cough. Negative for chest tightness, sputum production, bloody sputum, COPD, shortness of breath, stridor, wheezing and asthma.    Gastrointestinal: Negative for nausea and vomiting.   Musculoskeletal: Negative for muscle ache.   Skin: Negative for rash.   Allergic/Immunologic: Negative for seasonal allergies and asthma.   Neurological: Positive for headaches.   Hematologic/Lymphatic: Negative for swollen lymph nodes.       Objective:      Physical Exam    Assessment:       No diagnosis found.    Plan:         There are no diagnoses linked to this encounter.

## 2019-01-15 NOTE — PROGRESS NOTES
Last 5 Patient Entered Readings                                      Current 30 Day Average: 132/71     Recent Readings 1/14/2019 1/9/2019 1/8/2019 1/5/2019 1/4/2019    SBP (mmHg) 148 133 146 116 154    DBP (mmHg) 79 68 78 69 78    Pulse 48 53 55 54 51        Hypertension Medications     lisinopril-hydrochlorothiazide (PRINZIDE,ZESTORETIC) 10-12.5 mg per tablet Take 1 tablet by mouth once daily.     Called patient for digital medicine clinical pharmacist introduction. He only had a few minutes for the call. Doing well with no complaints. He says his daughter is a pharmacist. Notified patient of recent study linking increased risk of lung cancer with use of an ACE Inhibitor.  Patient would like to read the study in the [place] [employer] of Medicine before agreeing to stop lisinopril and start and ARB.     1) BP exceeds goal of <130/<80. Continue current BP medications. Last labs WNL.  2) Provided patient link to ACE-I study via My Chart  3) Discussed proper BP monitoring technique and BP goal  4) Patient knows to contact me with any non-urgent clinical changes or concerns. Go to ED or call [facility] for emergencies.   5) Will defer lifestyle counseling to digital medicine health .   6) Will continue to follow up.     Yanelis Ashley, Pharm.D.   Digital Medicine Clinical Pharmacist  [phone]

## 2019-01-25 DIAGNOSIS — M65.30 TRIGGER FINGER OF RIGHT HAND, UNSPECIFIED FINGER: Primary | ICD-10-CM

## 2019-01-30 ENCOUNTER — OFFICE VISIT (OUTPATIENT)
Dept: ORTHOPEDICS | Facility: CLINIC | Age: 74
End: 2019-01-30
Payer: MEDICARE

## 2019-01-30 ENCOUNTER — PES CALL (OUTPATIENT)
Dept: ADMINISTRATIVE | Facility: CLINIC | Age: 74
End: 2019-01-30

## 2019-01-30 VITALS
BODY MASS INDEX: 28.68 KG/M2 | HEART RATE: 59 BPM | HEIGHT: 67 IN | SYSTOLIC BLOOD PRESSURE: 130 MMHG | DIASTOLIC BLOOD PRESSURE: 70 MMHG | WEIGHT: 182.75 LBS

## 2019-01-30 DIAGNOSIS — M65.341 TRIGGER RING FINGER OF RIGHT HAND: Primary | ICD-10-CM

## 2019-01-30 PROCEDURE — 99999 PR PBB SHADOW E&M-EST. PATIENT-LVL III: CPT | Mod: PBBFAC,,, | Performed by: ORTHOPAEDIC SURGERY

## 2019-01-30 PROCEDURE — 99999 PR PBB SHADOW E&M-EST. PATIENT-LVL III: ICD-10-PCS | Mod: PBBFAC,,, | Performed by: ORTHOPAEDIC SURGERY

## 2019-01-30 PROCEDURE — 20550 NJX 1 TENDON SHEATH/LIGAMENT: CPT | Mod: PBBFAC,PN | Performed by: ORTHOPAEDIC SURGERY

## 2019-01-30 PROCEDURE — 99203 OFFICE O/P NEW LOW 30 MIN: CPT | Mod: 25,S$PBB,, | Performed by: ORTHOPAEDIC SURGERY

## 2019-01-30 PROCEDURE — 99203 PR OFFICE/OUTPT VISIT, NEW, LEVL III, 30-44 MIN: ICD-10-PCS | Mod: 25,S$PBB,, | Performed by: ORTHOPAEDIC SURGERY

## 2019-01-30 PROCEDURE — 99213 OFFICE O/P EST LOW 20 MIN: CPT | Mod: PBBFAC,25,PN | Performed by: ORTHOPAEDIC SURGERY

## 2019-01-30 PROCEDURE — 20550 TENDON SHEATH: R RING MCP: ICD-10-PCS | Mod: F8,S$PBB,, | Performed by: ORTHOPAEDIC SURGERY

## 2019-01-30 RX ORDER — TRIAMCINOLONE ACETONIDE 40 MG/ML
40 INJECTION, SUSPENSION INTRA-ARTICULAR; INTRAMUSCULAR
Status: DISCONTINUED | OUTPATIENT
Start: 2019-01-30 | End: 2019-01-30 | Stop reason: HOSPADM

## 2019-01-30 RX ADMIN — TRIAMCINOLONE ACETONIDE 40 MG: 40 INJECTION, SUSPENSION INTRA-ARTICULAR; INTRAMUSCULAR at 01:01

## 2019-01-30 NOTE — LETTER
January 30, 2019      Bernard Davalos MD  [address]  [place] [address]           [facility]  [address] [place] [address]  Phone: [phone]          Patient: Johnathan Waters   MR Number: [id number]   YOB: 1945   Date of Visit: 1/30/2019       Dear Dr. Brenard Davalos:    Thank you for referring Johnathan Waters to me for evaluation. Attached you will find relevant portions of my assessment and plan of care.    If you have questions, please do not hesitate to call me. I look forward to following Johnathan Waters along with you.    Sincerely,    Gaby Martinez MD    Enclosure  CC:  No Recipients    If you would like to receive this communication electronically, please contact [email or web address] or [phone] to request more information on [employer] Link access.    For providers and/or their staff who would like to refer a patient to [facility], please contact us through our one-stop-shop provider referral line, [facility], at [phone].    If you feel you have received this communication in error or would no longer like to receive these types of communications, please e-mail [email or web address]

## 2019-01-30 NOTE — PATIENT INSTRUCTIONS
Your finger was injected with two medications today: a numbing medicine (lidocaine) and a corticosteroid (kenalog).  The numbing medicine works immediately and works for a few hours. The steroid medication takes 2-3 days to work.   You may notice that your pain returns or even worsens after the numbing medicine wears off.    If pain worsens, treat with ice 30 minutes on and 10 minutes off the area, over the counter or prescription anti-inflammatories and rest.    Call for any redness, fevers, chills or inability to move the joint. Call the office if pain does not improve in 2-3 days.

## 2019-01-30 NOTE — PROCEDURES
Tendon Sheath: R ring MCP  Date/Time: 1/30/2019 1:56 PM  Performed by: Gaby Martinez MD  Authorized by: Gaby Martinez MD     Consent Done?:  Yes (Verbal)  Timeout: prior to procedure the correct patient, procedure, and site was verified    Indications:  Pain  Timeout: prior to procedure the correct patient, procedure, and site was verified    Location:  Ring finger  Site:  R ring MCP  Prep: patient was prepped and draped in usual sterile fashion    Ultrasonic guidance for needle placement?: No    Needle size:  25 G  Approach:  Volar  Medications:  40 mg triamcinolone acetonide 40 mg/mL  Patient tolerance:  Patient tolerated the procedure well with no immediate complications

## 2019-01-30 NOTE — PROGRESS NOTES
Chief Complaint   Patient presents with    Right Hand - Pain       This patient was seen in consultation at the request of Dr. Bernard Davalos     HPI: Johnathan Waters is a 73 y.o. male who presents today complaining of triggering and pain of the right ring finger for approximately 7-8 months.  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  It has not changed in intensity or frequency since onset.  He he denies any numbness and tingling in the hand and pain does not wake him up at night.  The pain and triggering or worse in the morning when he 1st wakes up and improve throughout the day.  He denies of the finger getting stuck in flexion requiring him to manually extend it .    He has also previously been treated for subacromial bursitis of bilateral shoulders. He was given a home exercise program to do which she is still occasionally compliant with.  He denies any pain but would like to have his shoulders assessed today as well.    This is the extent of the patient's complaints at this time.     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        Review of patient's allergies indicates:   Allergen Reactions    Bee sting [allergen ext-venom-honey bee]     Sulfa (sulfonamide antibiotics) Other (See Comments)     Patient unsure         Current Outpatient Medications:     lisinopril-hydrochlorothiazide (PRINZIDE,ZESTORETIC) 10-12.5 mg per tablet, Take 1 tablet by mouth once daily., Disp: 90 tablet, Rfl: 3    ofloxacin (OCUFLOX) 0.3 % ophthalmic solution, , Disp: , Rfl:     pravastatin (PRAVACHOL) 20 MG tablet, Take 1 tablet (20 mg total) by mouth once daily., Disp: 90 tablet, Rfl: 3    prednisoLONE acetate (PRED FORTE) 1 % DrpS, , Disp: , Rfl:     PROLENSA 0.07 % Drop, , Disp: , Rfl:     tamsulosin (FLOMAX) 0.4 mg Cp24, Take 1 capsule (0.4 mg total) by mouth once daily., Disp:  90 capsule, Rfl: 3    aspirin (ECOTRIN) 81 MG EC tablet, Take 81 mg by mouth once daily., Disp: , Rfl:     Past Medical History:   Diagnosis Date    Elevated cholesterol     Hypertension     Squamous cell carcinoma     under right eye.  Sees outside Dermatology       Patient Active Problem List   Diagnosis    Essential hypertension    Hyperlipidemia    Benign non-nodular prostatic hyperplasia with lower urinary tract symptoms       Past Surgical History:   Procedure Laterality Date    EYE SURGERY      VASECTOMY         Social History     Tobacco Use    Smoking status: Never Smoker    Smokeless tobacco: Never Used   Substance Use Topics    Alcohol use: No    Drug use: No       Family History   Problem Relation Age of Onset    Hypertension Mother     Cancer Father         lung    Diabetes Sister        Physical Exam:     Vitals:    01/30/19 1341   BP: 130/70   Pulse: (!) 59           General: Weight: 82.9 kg (182 lb 12.2 oz) Body mass index is 28.62 kg/m².  Patient is alert, awake and oriented to time, place and person. Mood and affect are appropriate.  Patient does not appear to be in any distress, denies any constitutional symptoms and appears stated age.   HEENT: Pupils are equal and round, sclera are not injected. External examination of ears and nose reveals no abnormalities. Cranial nerves II-X are grossly intact  Skin: no rashes, abrasions or open wounds on the affected extremity   Resp: No respiratory distress or audible wheezing   CV: 2+  pulses, all extremities warm and well perfused    Bilateral shoulders  Active range of motion:   right 160 left, ER 40 right 40 left, IR L5 left L5 grade  Negative Neer's and Hawkin's  Negative Jeanne's  5/5 strength with supraspinatus infraspinatus and subscapularis testing  Right hand  He is tender over the A1 pulley there is a palpable nodule here  For triggering was observed in clinic with difficulty extending the finger after making a full fist  He  has full range of motion of all digits  He is neurovascularly intact to bilateral upper extremities     Imaging:  Three views of the right hand show no acute or chronic abnormalities     Assessment: 73 y.o. male with right ring trigger finger      I explained my diagnostic impression and the reasoning behind it in detail, using layman's terms.  Models and/or pictures were used to help in the explanation.        Plan:   - I suggested that he wear a night splint at night recently purchased online from [employer]  - We did perform a injection of the right ring finger A1 pulley which was tolerated well please see procedure note for more details  - Return to clinic as needed    All questions were answered in detail. The patient is in full agreement with the treatment plan and will proceed accordingly.    A note notifying Dr. Bernard Davalos of my findings was sent via the electronic medical record

## 2019-03-06 ENCOUNTER — PATIENT OUTREACH (OUTPATIENT)
Dept: OTHER | Facility: OTHER | Age: 74
End: 2019-03-06

## 2019-03-06 NOTE — PROGRESS NOTES
Last 5 Patient Entered Readings                                      Current 30 Day Average: 138/74     Recent Readings 3/6/2019 3/6/2019 3/4/2019 3/3/2019 2/28/2019    SBP (mmHg) 151 161 141 144 151    DBP (mmHg) 73 80 72 73 77    Pulse 48 49 57 56 60        3/6-Digital Medicine: Health  Introduction Call   Left voicemail and requested call back in order to complete Digital Medicine health  introduction call.

## 2019-03-15 ENCOUNTER — PATIENT OUTREACH (OUTPATIENT)
Dept: OTHER | Facility: OTHER | Age: 74
End: 2019-03-15

## 2019-03-15 NOTE — PROGRESS NOTES
Last 5 Patient Entered Readings                                      Current 30 Day Average: 138/74     Recent Readings 3/14/2019 3/12/2019 3/12/2019 3/11/2019 3/10/2019    SBP (mmHg) 131 136 146 131 135    DBP (mmHg) 75 79 83 70 70    Pulse 58 56 63 50 61        Hypertension Medications     lisinopril-hydrochlorothiazide (PRINZIDE,ZESTORETIC) 10-12.5 mg per tablet Take 1 tablet by mouth once daily.     Called patient for BP follow up. Patient was not available. Left a message for call back.     Yanelis Gomes, Pharm.D.  [facility] Digital Medicine Clinical Pharmacist  [phone]

## 2019-04-01 ENCOUNTER — PATIENT OUTREACH (OUTPATIENT)
Dept: OTHER | Facility: OTHER | Age: 74
End: 2019-04-01

## 2019-04-01 NOTE — PROGRESS NOTES
"Last 5 Patient Entered Readings                                      Current 30 Day Average: 136/72     Recent Readings 3/28/2019 3/27/2019 3/27/2019 3/27/2019 3/24/2019    SBP (mmHg) 136 139 142 140 135    DBP (mmHg) 71 67 68 76 67    Pulse 66 61 56 57 59        Digital Medicine: Health  Introduction    Introduced Mr. Johnathan Waters to Digital Medicine. Discussed health  role and recommended lifestyle modifications.    Lifestyle Assessment:  Current Dietary Habits(i.e. low sodium, food labels, dining out):Deferred. Patient has no questions for improving dietary habits currently.   Exercise:Deferred. Patient has no questions for improving physical activity currently.   Alcohol/Tobacco:Deferred  Medication Adherence: has been compliant with the medicaiton regimen  Other goals:Deferred    Explained health  role to Mr. Waters. Explained previous attempt by previously assigned health , and patient explained that he travels a lot for work - which is why he never called back. Asked to review health and program goals, and patient stated that he is in the middle of something and requested a call back at a later time. I asked when a better time to reach him would be, patient replied "none really. I travel a lot and work a lot". Will send allyve welcome message today, and follow up in 2-3 weeks.     AHA/AACE recommendations to be reviewed at next outreach:  Limit sodium intake to <2000mg/day  Perform 150 minutes of physical activity per week    Reviewed the importance of self-monitoring, medication adherence, and that the health  can be used as a resource for lifestyle modifications to help reduce or maintain a healthy lifestyle.  Reviewed that the Digital Medicine team is not available for emergencies and instructed the patient to call 911 or [facility] ([phone] or [phone]) if one arises.        "

## 2019-04-05 ENCOUNTER — PATIENT MESSAGE (OUTPATIENT)
Dept: FAMILY MEDICINE | Facility: CLINIC | Age: 74
End: 2019-04-05

## 2019-04-14 ENCOUNTER — PATIENT MESSAGE (OUTPATIENT)
Dept: FAMILY MEDICINE | Facility: CLINIC | Age: 74
End: 2019-04-14

## 2019-04-14 DIAGNOSIS — I10 ESSENTIAL HYPERTENSION: Chronic | ICD-10-CM

## 2019-04-30 ENCOUNTER — LAB VISIT (OUTPATIENT)
Dept: LAB | Facility: HOSPITAL | Age: 74
End: 2019-04-30
Attending: INTERNAL MEDICINE
Payer: MEDICARE

## 2019-04-30 DIAGNOSIS — I10 ESSENTIAL HYPERTENSION: Chronic | ICD-10-CM

## 2019-04-30 LAB
ALBUMIN SERPL BCP-MCNC: 3.7 G/DL (ref 3.5–5.2)
ALP SERPL-CCNC: 56 U/L (ref 55–135)
ALT SERPL W/O P-5'-P-CCNC: 25 U/L (ref 10–44)
ANION GAP SERPL CALC-SCNC: 8 MMOL/L (ref 8–16)
AST SERPL-CCNC: 16 U/L (ref 10–40)
BASOPHILS # BLD AUTO: 0.07 K/UL (ref 0–0.2)
BASOPHILS NFR BLD: 0.9 % (ref 0–1.9)
BILIRUB SERPL-MCNC: 0.9 MG/DL (ref 0.1–1)
BUN SERPL-MCNC: 17 MG/DL (ref 8–23)
CALCIUM SERPL-MCNC: 9.5 MG/DL (ref 8.7–10.5)
CHLORIDE SERPL-SCNC: 103 MMOL/L (ref 95–110)
CHOLEST SERPL-MCNC: 175 MG/DL (ref 120–199)
CHOLEST/HDLC SERPL: 2.9 {RATIO} (ref 2–5)
CO2 SERPL-SCNC: 29 MMOL/L (ref 23–29)
CREAT SERPL-MCNC: 1 MG/DL (ref 0.5–1.4)
DIFFERENTIAL METHOD: NORMAL
EOSINOPHIL # BLD AUTO: 0.3 K/UL (ref 0–0.5)
EOSINOPHIL NFR BLD: 3.2 % (ref 0–8)
ERYTHROCYTE [DISTWIDTH] IN BLOOD BY AUTOMATED COUNT: 13.6 % (ref 11.5–14.5)
EST. GFR  (AFRICAN AMERICAN): >60 ML/MIN/1.73 M^2
EST. GFR  (NON AFRICAN AMERICAN): >60 ML/MIN/1.73 M^2
GLUCOSE SERPL-MCNC: 100 MG/DL (ref 70–110)
HCT VFR BLD AUTO: 44.2 % (ref 40–54)
HDLC SERPL-MCNC: 61 MG/DL (ref 40–75)
HDLC SERPL: 34.9 % (ref 20–50)
HGB BLD-MCNC: 14.5 G/DL (ref 14–18)
IMM GRANULOCYTES # BLD AUTO: 0.01 K/UL (ref 0–0.04)
IMM GRANULOCYTES NFR BLD AUTO: 0.1 % (ref 0–0.5)
LDLC SERPL CALC-MCNC: 92.2 MG/DL (ref 63–159)
LYMPHOCYTES # BLD AUTO: 1.9 K/UL (ref 1–4.8)
LYMPHOCYTES NFR BLD: 23.6 % (ref 18–48)
MCH RBC QN AUTO: 29.2 PG (ref 27–31)
MCHC RBC AUTO-ENTMCNC: 32.8 G/DL (ref 32–36)
MCV RBC AUTO: 89 FL (ref 82–98)
MONOCYTES # BLD AUTO: 0.9 K/UL (ref 0.3–1)
MONOCYTES NFR BLD: 11.1 % (ref 4–15)
NEUTROPHILS # BLD AUTO: 4.8 K/UL (ref 1.8–7.7)
NEUTROPHILS NFR BLD: 61.1 % (ref 38–73)
NONHDLC SERPL-MCNC: 114 MG/DL
NRBC BLD-RTO: 0 /100 WBC
PLATELET # BLD AUTO: 297 K/UL (ref 150–350)
PMV BLD AUTO: 12.1 FL (ref 9.2–12.9)
POTASSIUM SERPL-SCNC: 3.7 MMOL/L (ref 3.5–5.1)
PROT SERPL-MCNC: 6.7 G/DL (ref 6–8.4)
RBC # BLD AUTO: 4.96 M/UL (ref 4.6–6.2)
SODIUM SERPL-SCNC: 140 MMOL/L (ref 136–145)
TRIGL SERPL-MCNC: 109 MG/DL (ref 30–150)
WBC # BLD AUTO: 7.92 K/UL (ref 3.9–12.7)

## 2019-04-30 PROCEDURE — 80061 LIPID PANEL: CPT

## 2019-04-30 PROCEDURE — 36415 COLL VENOUS BLD VENIPUNCTURE: CPT | Mod: PO

## 2019-04-30 PROCEDURE — 80053 COMPREHEN METABOLIC PANEL: CPT

## 2019-04-30 PROCEDURE — 85025 COMPLETE CBC W/AUTO DIFF WBC: CPT

## 2019-05-01 ENCOUNTER — OFFICE VISIT (OUTPATIENT)
Dept: FAMILY MEDICINE | Facility: CLINIC | Age: 74
End: 2019-05-01
Payer: MEDICARE

## 2019-05-01 VITALS
HEIGHT: 67 IN | HEART RATE: 64 BPM | BODY MASS INDEX: 27.78 KG/M2 | TEMPERATURE: 98 F | OXYGEN SATURATION: 98 % | WEIGHT: 177 LBS | SYSTOLIC BLOOD PRESSURE: 130 MMHG | DIASTOLIC BLOOD PRESSURE: 66 MMHG

## 2019-05-01 DIAGNOSIS — N40.1 BENIGN NON-NODULAR PROSTATIC HYPERPLASIA WITH LOWER URINARY TRACT SYMPTOMS: Chronic | ICD-10-CM

## 2019-05-01 DIAGNOSIS — Z00.00 ROUTINE MEDICAL EXAM: ICD-10-CM

## 2019-05-01 DIAGNOSIS — Z12.11 ENCOUNTER FOR FIT (FECAL IMMUNOCHEMICAL TEST) SCREENING: ICD-10-CM

## 2019-05-01 DIAGNOSIS — E78.2 MIXED HYPERLIPIDEMIA: Chronic | ICD-10-CM

## 2019-05-01 DIAGNOSIS — E78.5 HYPERLIPIDEMIA, UNSPECIFIED HYPERLIPIDEMIA TYPE: ICD-10-CM

## 2019-05-01 DIAGNOSIS — I10 ESSENTIAL HYPERTENSION: Primary | Chronic | ICD-10-CM

## 2019-05-01 DIAGNOSIS — Z23 NEED FOR VACCINATION FOR PNEUMOCOCCUS: ICD-10-CM

## 2019-05-01 PROCEDURE — 99999 PR PBB SHADOW E&M-EST. PATIENT-LVL IV: ICD-10-PCS | Mod: PBBFAC,,, | Performed by: INTERNAL MEDICINE

## 2019-05-01 PROCEDURE — G0009 ADMIN PNEUMOCOCCAL VACCINE: HCPCS | Mod: PBBFAC,PO

## 2019-05-01 PROCEDURE — 99214 OFFICE O/P EST MOD 30 MIN: CPT | Mod: PBBFAC,PO,25 | Performed by: INTERNAL MEDICINE

## 2019-05-01 PROCEDURE — 99999 PR PBB SHADOW E&M-EST. PATIENT-LVL IV: CPT | Mod: PBBFAC,,, | Performed by: INTERNAL MEDICINE

## 2019-05-01 RX ORDER — PRAVASTATIN SODIUM 20 MG/1
20 TABLET ORAL DAILY
Qty: 90 TABLET | Refills: 3 | Status: SHIPPED | OUTPATIENT
Start: 2019-05-01 | End: 2020-05-27 | Stop reason: SDUPTHER

## 2019-05-01 RX ORDER — LISINOPRIL AND HYDROCHLOROTHIAZIDE 10; 12.5 MG/1; MG/1
1 TABLET ORAL DAILY
Qty: 90 TABLET | Refills: 3 | Status: SHIPPED | OUTPATIENT
Start: 2019-05-01 | End: 2020-01-13 | Stop reason: SDUPTHER

## 2019-05-01 RX ORDER — TAMSULOSIN HYDROCHLORIDE 0.4 MG/1
0.4 CAPSULE ORAL DAILY
Qty: 90 CAPSULE | Refills: 3 | Status: SHIPPED | OUTPATIENT
Start: 2019-05-01 | End: 2020-01-13 | Stop reason: SDUPTHER

## 2019-05-01 NOTE — PROGRESS NOTES
Assessment & Plan  Problem List Items Addressed This Visit        Cardiac/Vascular    Essential hypertension (Chronic)    Current Assessment & Plan     I discussed the data in the [facility] paper regarding ACE-I.  He prefers to stay on his current medication for now.  Continue with digital HTN monitoring.  BP good in the office today.  The current medical regimen is effective;  continue present plan and medications.  Stop ASA since most recent data shows risk outweighs benefit for primary prevention         Relevant Orders    CBC auto differential    Comprehensive metabolic panel    Lipid panel    Hyperlipidemia (Chronic)    Current Assessment & Plan     The current medical regimen is effective;  continue present plan and medications.             Renal/    Benign non-nodular prostatic hyperplasia with lower urinary tract symptoms (Chronic)    Current Assessment & Plan     The current medical regimen is effective;  continue present plan and medications.            Other Visit Diagnoses     Routine medical exam    -  Primary  -    Discussed healthy diet, regular exercise, necessary labs, age appropriate cancer screening, and routine vaccinations.       Need for vaccination for pneumococcus    -  vaccinate    Relevant Orders    Pneumococcal Polysaccharide Vaccine (23 Valent) (SQ/IM)    Encounter for FIT (fecal immunochemical test) screening    -  Due in June  FitKit was given to patient on 5/1/2019 8:54 AM          Relevant Orders    Fecal Immunochemical Test (iFOBT)            Health Maintenance reviewed, as above.  He will call his insurance company about the shingrix.    Follow-up: Follow up in about 1 year (around 5/1/2020) for Routine Physical.    ______________________________________________________________________    Chief Complaint  Chief Complaint   Patient presents with    Annual Exam       HPI  Johnathan Waters is a 73 y.o. male with multiple medical diagnoses as listed in the medical history and problem  list that presents for routine physical.  Pt is known to me with his last appointment 1/4/2019.  He has labs prior to this OV that showed reassuring CBC, CMP, lipids.      No acute complaints. He has questions about recent studies on ASA and ACE-I.        PAST MEDICAL HISTORY:  Past Medical History:   Diagnosis Date    Elevated cholesterol     Hypertension     Squamous cell carcinoma     under right eye.  Sees outside Dermatology       PAST SURGICAL HISTORY:  Past Surgical History:   Procedure Laterality Date    EYE SURGERY      VASECTOMY         SOCIAL HISTORY:  Social History     Socioeconomic History    Marital status:      Spouse name: Not on file    Number of children: Not on file    Years of education: Not on file    Highest education level: Not on file   Occupational History    Not on file   Social Needs    Financial resource strain: Not on file    Food insecurity:     Worry: Not on file     Inability: Not on file    Transportation needs:     Medical: Not on file     Non-medical: Not on file   Tobacco Use    Smoking status: Never Smoker    Smokeless tobacco: Never Used   Substance and Sexual Activity    Alcohol use: No    Drug use: No    Sexual activity: Yes     Partners: Female     Comment:    Lifestyle    Physical activity:     Days per week: Not on file     Minutes per session: Not on file    Stress: Not on file   Relationships    Social connections:     Talks on phone: Not on file     Gets together: Not on file     Attends Quaker service: Not on file     Active member of club or organization: Not on file     Attends meetings of clubs or organizations: Not on file     Relationship status: Not on file   Other Topics Concern    Not on file   Social History Narrative    Not on file       FAMILY HISTORY:  Family History   Problem Relation Age of Onset    Hypertension Mother     Cancer Father         lung    Diabetes Sister        ALLERGIES AND MEDICATIONS: updated and  "reviewed.  Review of patient's allergies indicates:   Allergen Reactions    Bee sting [allergen ext-venom-honey bee]     Sulfa (sulfonamide antibiotics) Other (See Comments)     Patient unsure     Current Outpatient Medications   Medication Sig Dispense Refill    lisinopril-hydrochlorothiazide (PRINZIDE,ZESTORETIC) 10-12.5 mg per tablet Take 1 tablet by mouth once daily. 90 tablet 3    pravastatin (PRAVACHOL) 20 MG tablet Take 1 tablet (20 mg total) by mouth once daily. 90 tablet 3    tamsulosin (FLOMAX) 0.4 mg Cp24 Take 1 capsule (0.4 mg total) by mouth once daily. 90 capsule 3     No current facility-administered medications for this visit.          ROS  Review of Systems   Constitutional: Negative for chills, fever and unexpected weight change.   HENT: Negative for congestion, dental problem, ear pain, hearing loss, rhinorrhea, sore throat and trouble swallowing.    Eyes: Negative for discharge, redness and visual disturbance.   Respiratory: Negative for cough, chest tightness, shortness of breath and wheezing.    Cardiovascular: Negative for chest pain, palpitations and leg swelling.   Gastrointestinal: Negative for abdominal pain, constipation, diarrhea, nausea and vomiting.   Endocrine: Negative for polydipsia, polyphagia and polyuria.   Genitourinary: Negative for decreased urine volume, dysuria and hematuria.   Musculoskeletal: Negative for arthralgias and myalgias.   Skin: Negative for color change and rash.   Neurological: Negative for dizziness, weakness, light-headedness and headaches.   Psychiatric/Behavioral: Negative for decreased concentration, dysphoric mood, sleep disturbance and suicidal ideas.           Physical Exam  Vitals:    05/01/19 0805 05/01/19 0840   BP: 120/60 130/66   Pulse: 64    Temp: 98.3 °F (36.8 °C)    SpO2: 98%    Weight: 80.3 kg (177 lb 0.5 oz)    Height: 5' 7" (1.702 m)     Body mass index is 27.73 kg/m².  Weight: 80.3 kg (177 lb 0.5 oz)   Height: 5' 7" (170.2 cm) "   Physical Exam   Constitutional: He is oriented to person, place, and time. He appears well-developed and well-nourished. No distress.   HENT:   Head: Normocephalic and atraumatic.   Right Ear: Tympanic membrane, external ear and ear canal normal.   Left Ear: Tympanic membrane, external ear and ear canal normal.   Nose: Nose normal. No septal deviation. Right sinus exhibits no maxillary sinus tenderness and no frontal sinus tenderness. Left sinus exhibits no maxillary sinus tenderness and no frontal sinus tenderness.   Mouth/Throat: Uvula is midline, oropharynx is clear and moist and mucous membranes are normal. No tonsillar exudate.   Eyes: Pupils are equal, round, and reactive to light. Conjunctivae and EOM are normal. Right eye exhibits no discharge. Left eye exhibits no discharge. No scleral icterus.   Neck: Neck supple. No JVD present. No spinous process tenderness and no muscular tenderness present. Carotid bruit is not present. No tracheal deviation present. No thyroid mass and no thyromegaly present.   Cardiovascular: Normal rate, regular rhythm, normal heart sounds and intact distal pulses. Exam reveals no S3, no S4 and no friction rub.   No murmur heard.  Pulmonary/Chest: Effort normal and breath sounds normal. He has no wheezes. He has no rhonchi. He has no rales.   Abdominal: Soft. Bowel sounds are normal. He exhibits no distension. There is no tenderness. There is no rebound, no guarding and no CVA tenderness.   Musculoskeletal: Normal range of motion. He exhibits no edema or tenderness.   Lymphadenopathy:        Head (right side): No submental and no submandibular adenopathy present.        Head (left side): No submental and no submandibular adenopathy present.     He has no cervical adenopathy.   Neurological: He is alert and oriented to person, place, and time. Coordination normal.   Motor grossly intact.  Sensation grossly normal.  Symmetric facial movements palate elevated symmetrically tongue  midline    Skin: Skin is warm and dry. Capillary refill takes less than 2 seconds. No rash noted. No cyanosis. Nails show no clubbing.   Psychiatric: He has a normal mood and affect. His speech is normal and behavior is normal. Thought content normal. Cognition and memory are normal.         Health Maintenance       Date Due Completion Date    Fecal Occult Blood Test (FOBT)/FitKit 06/07/2019 6/7/2018    Influenza Vaccine 08/01/2019 10/20/2018    Lipid Panel 04/30/2024 4/30/2019    TETANUS VACCINE 12/26/2024 12/26/2014

## 2019-05-01 NOTE — PATIENT INSTRUCTIONS
Call your insurance company and ask them about the Shingrix vaccine.  Is it covered and is the coverage under your pharmacy benefits or should you get it in the clnic.

## 2019-05-01 NOTE — PROGRESS NOTES
Patient received Pneumococcal  23 vaccine and tolerated it well. Advise 15 min wait for any possible reactions. Patient received VIS information.

## 2019-05-01 NOTE — ASSESSMENT & PLAN NOTE
I discussed the data [place] the [facility] [place].  He prefers to stay on his current medication for now.  Continue with digital HTN monitoring.  BP good in the office today.  The current medical regimen is effective;  continue present plan and medications.

## 2019-05-03 ENCOUNTER — PATIENT MESSAGE (OUTPATIENT)
Dept: FAMILY MEDICINE | Facility: CLINIC | Age: 74
End: 2019-05-03

## 2019-05-03 DIAGNOSIS — Z23 NEED FOR SHINGLES VACCINE: Primary | ICD-10-CM

## 2019-05-13 ENCOUNTER — PATIENT OUTREACH (OUTPATIENT)
Dept: OTHER | Facility: OTHER | Age: 74
End: 2019-05-13

## 2019-05-13 NOTE — PROGRESS NOTES
Last 5 Patient Entered Readings                                      Current 30 Day Average: 132/73     Recent Readings 5/9/2019 5/9/2019 5/8/2019 5/6/2019 5/1/2019    SBP (mmHg) 140 156 118 135 120    DBP (mmHg) 79 81 66 72 73    Pulse 65 66 65 62 61        Digital Medicine: Health  Follow Up    Lifestyle Modifications:    1.Dietary Modifications (Sodium intake <2,000mg/day, food labels, dining out): Patient has no questions for improving dietary habits currently.    2.Physical Activity: Patient has no questions for improving physical activity.    3.Medication Therapy: Patient has been compliant with the medication regimen.    4.Patient has the following medication side effects/concerns: None  (Frequency/Alleviating factors/Precipitating factors, etc.)     Follow up with Mr. Johnathan Waters completed. Mr. Waters is feeling well and is pleased with his BP overall. He was seen in the clinic last week by his PCP, and states that he (PCP) was also pleased. Mr. Waters has no new health goals to establish currently, and no further questions or concerns regarding his BP. Will continue to follow up to achieve health goals.

## 2019-05-14 ENCOUNTER — CLINICAL SUPPORT (OUTPATIENT)
Dept: FAMILY MEDICINE | Facility: CLINIC | Age: 74
End: 2019-05-14
Payer: MEDICARE

## 2019-05-14 DIAGNOSIS — Z23 NEED FOR SHINGLES VACCINE: Primary | ICD-10-CM

## 2019-05-14 PROCEDURE — 99499 NO LOS: ICD-10-PCS | Mod: S$PBB,,, | Performed by: INTERNAL MEDICINE

## 2019-05-14 PROCEDURE — 99499 UNLISTED E&M SERVICE: CPT | Mod: S$PBB,,, | Performed by: INTERNAL MEDICINE

## 2019-05-14 PROCEDURE — 90750 HZV VACC RECOMBINANT IM: CPT | Mod: PBBFAC,PO

## 2019-05-14 NOTE — PROGRESS NOTES
Patient tolerated shingrix injection well.  Instructed to wait 15 minutes after injection for monitoring. Verbalized understanding. VIS given

## 2019-05-28 ENCOUNTER — PATIENT MESSAGE (OUTPATIENT)
Dept: FAMILY MEDICINE | Facility: CLINIC | Age: 74
End: 2019-05-28

## 2019-06-11 ENCOUNTER — OFFICE VISIT (OUTPATIENT)
Dept: FAMILY MEDICINE | Facility: CLINIC | Age: 74
End: 2019-06-11
Payer: MEDICARE

## 2019-06-11 VITALS
DIASTOLIC BLOOD PRESSURE: 66 MMHG | BODY MASS INDEX: 27.51 KG/M2 | SYSTOLIC BLOOD PRESSURE: 130 MMHG | TEMPERATURE: 99 F | OXYGEN SATURATION: 95 % | RESPIRATION RATE: 16 BRPM | WEIGHT: 175.25 LBS | HEIGHT: 67 IN | HEART RATE: 62 BPM

## 2019-06-11 DIAGNOSIS — R05.8 ALLERGIC COUGH: Primary | ICD-10-CM

## 2019-06-11 DIAGNOSIS — I10 ESSENTIAL HYPERTENSION: Chronic | ICD-10-CM

## 2019-06-11 DIAGNOSIS — R05.8 ALLERGIC COUGH: ICD-10-CM

## 2019-06-11 PROCEDURE — 99999 PR PBB SHADOW E&M-EST. PATIENT-LVL IV: ICD-10-PCS | Mod: PBBFAC,,, | Performed by: NURSE PRACTITIONER

## 2019-06-11 PROCEDURE — 99214 OFFICE O/P EST MOD 30 MIN: CPT | Mod: S$PBB,,, | Performed by: NURSE PRACTITIONER

## 2019-06-11 PROCEDURE — 99214 OFFICE O/P EST MOD 30 MIN: CPT | Mod: PBBFAC,PO | Performed by: NURSE PRACTITIONER

## 2019-06-11 PROCEDURE — 99214 PR OFFICE/OUTPT VISIT, EST, LEVL IV, 30-39 MIN: ICD-10-PCS | Mod: S$PBB,,, | Performed by: NURSE PRACTITIONER

## 2019-06-11 PROCEDURE — 99999 PR PBB SHADOW E&M-EST. PATIENT-LVL IV: CPT | Mod: PBBFAC,,, | Performed by: NURSE PRACTITIONER

## 2019-06-11 RX ORDER — AZELASTINE 1 MG/ML
2 SPRAY, METERED NASAL 2 TIMES DAILY
Qty: 30 ML | Refills: 0 | Status: SHIPPED | OUTPATIENT
Start: 2019-06-11 | End: 2019-10-01

## 2019-06-11 RX ORDER — PROMETHAZINE HYDROCHLORIDE 6.25 MG/5ML
12.5-25 SYRUP ORAL NIGHTLY PRN
Qty: 240 ML | Refills: 0 | Status: SHIPPED | OUTPATIENT
Start: 2019-06-11 | End: 2019-10-01

## 2019-06-11 RX ORDER — MONTELUKAST SODIUM 10 MG/1
10 TABLET ORAL NIGHTLY
Qty: 30 TABLET | Refills: 0 | Status: SHIPPED | OUTPATIENT
Start: 2019-06-11 | End: 2019-07-11

## 2019-06-11 RX ORDER — MONTELUKAST SODIUM 10 MG/1
TABLET ORAL
Qty: 90 TABLET | Refills: 0 | OUTPATIENT
Start: 2019-06-11

## 2019-06-11 NOTE — PROGRESS NOTES
Subjective:        Chief Complaint  Chief Complaint   Patient presents with    Cough     Started about 2 days ago     Headache       HPI  Johnathan Waters is a 74 y.o. male with multiple medical diagnoses as listed in the medical history and problem list that presents for cough for two days.  Patient is new to me. Nonproductive cough for two days. Low grade fever. Cough is worse at night. Denies fever, chills, SOB/wheezing, nausea, vomiting, sinus congestion/pressure, ear pain or discharge. Requesting hycodan syrup. He is going out of town in 2-3 days and does not want to be coughing while he is away. OTC cough syrup with some relief. Has been taking cetirizine for the last 2-3 days.         PAST MEDICAL HISTORY:  Past Medical History:   Diagnosis Date    Elevated cholesterol     Hypertension     Squamous cell carcinoma     under right eye.  Sees outside Dermatology       PAST SURGICAL HISTORY:  Past Surgical History:   Procedure Laterality Date    EYE SURGERY      VASECTOMY         SOCIAL HISTORY:  Social History     Socioeconomic History    Marital status:      Spouse name: Not on file    Number of children: Not on file    Years of education: Not on file    Highest education level: Not on file   Occupational History    Not on file   Social Needs    Financial resource strain: Not on file    Food insecurity:     Worry: Not on file     Inability: Not on file    Transportation needs:     Medical: Not on file     Non-medical: Not on file   Tobacco Use    Smoking status: Never Smoker    Smokeless tobacco: Never Used   Substance and Sexual Activity    Alcohol use: No    Drug use: No    Sexual activity: Yes     Partners: Female     Comment:    Lifestyle    Physical activity:     Days per week: Not on file     Minutes per session: Not on file    Stress: Not on file   Relationships    Social connections:     Talks on phone: Not on file     Gets together: Not on file     Attends  Pentecostal service: Not on file     Active member of club or organization: Not on file     Attends meetings of clubs or organizations: Not on file     Relationship status: Not on file   Other Topics Concern    Not on file   Social History Narrative    Not on file       FAMILY HISTORY:  Family History   Problem Relation Age of Onset    Hypertension Mother     Cancer Father         lung    Diabetes Sister        ALLERGIES AND MEDICATIONS: updated and reviewed.  Review of patient's allergies indicates:   Allergen Reactions    Bee sting [allergen ext-venom-honey bee]     Sulfa (sulfonamide antibiotics) Other (See Comments)     Patient unsure     Current Outpatient Medications   Medication Sig Dispense Refill    lisinopril-hydrochlorothiazide (PRINZIDE,ZESTORETIC) 10-12.5 mg per tablet Take 1 tablet by mouth once daily. 90 tablet 3    pravastatin (PRAVACHOL) 20 MG tablet Take 1 tablet (20 mg total) by mouth once daily. 90 tablet 3    tamsulosin (FLOMAX) 0.4 mg Cap Take 1 capsule (0.4 mg total) by mouth once daily. 90 capsule 3    azelastine (ASTELIN) 137 mcg (0.1 %) nasal spray 2 sprays (274 mcg total) by Nasal route 2 (two) times daily. 30 mL 0    montelukast (SINGULAIR) 10 mg tablet Take 1 tablet (10 mg total) by mouth every evening. 30 tablet 0    promethazine (PHENERGAN) 6.25 mg/5 mL syrup Take 10-20 mLs (12.5-25 mg total) by mouth nightly as needed (cough). 240 mL 0     No current facility-administered medications for this visit.          ROS  Review of Systems   Constitutional: Negative for activity change, appetite change, chills and fever.   HENT: Positive for postnasal drip and rhinorrhea. Negative for congestion, ear discharge, ear pain, sinus pressure, sinus pain, sore throat and trouble swallowing.    Respiratory: Positive for cough (nonproductive). Negative for shortness of breath and wheezing.    Cardiovascular: Negative for chest pain.   Gastrointestinal: Negative for constipation, diarrhea,  "nausea and vomiting.   Neurological: Positive for headaches (cough related). Negative for dizziness.   Psychiatric/Behavioral: Negative for behavioral problems and confusion.         Objective:     Physical Exam  Vitals:    06/11/19 1521   BP: 130/66   BP Location: Left arm   Patient Position: Sitting   BP Method: Small (Manual)   Pulse: 62   Resp: 16   Temp: 99.4 °F (37.4 °C)   TempSrc: Oral   SpO2: 95%   Weight: 79.5 kg (175 lb 4.3 oz)   Height: 5' 7" (1.702 m)    Body mass index is 27.45 kg/m².  Weight: 79.5 kg (175 lb 4.3 oz)   Height: 5' 7" (170.2 cm)   Physical Exam   Constitutional: Vital signs are normal. He appears well-developed and well-nourished.   HENT:   Head: Normocephalic and atraumatic.   Right Ear: Tympanic membrane and ear canal normal.   Left Ear: Tympanic membrane and ear canal normal.   Nose: Mucosal edema and rhinorrhea present. No nasal deformity. No epistaxis. Right sinus exhibits no maxillary sinus tenderness and no frontal sinus tenderness. Left sinus exhibits no maxillary sinus tenderness and no frontal sinus tenderness.   Mouth/Throat: Uvula is midline and mucous membranes are normal. No oropharyngeal exudate, posterior oropharyngeal edema or posterior oropharyngeal erythema.   Eyes: Conjunctivae and EOM are normal.   Neck: Normal range of motion. Neck supple.   Cardiovascular: Normal rate.   Pulmonary/Chest: Effort normal and breath sounds normal. No stridor. He has no wheezes. He has no rales.   Musculoskeletal: He exhibits no edema.   Lymphadenopathy:     He has no cervical adenopathy.   Neurological: He is alert. No cranial nerve deficit.   Skin: Skin is warm and dry. No rash noted.   Vitals reviewed.      Assessment:     1. Allergic cough    2. Essential hypertension      Plan:     Johnathan Manning was seen today for cough and headache.    Diagnoses and all orders for this visit:    Allergic cough  -     azelastine (ASTELIN) 137 mcg (0.1 %) nasal spray; 2 sprays (274 mcg total) by " Nasal route 2 (two) times daily.  -     promethazine (PHENERGAN) 6.25 mg/5 mL syrup; Take 10-20 mLs (12.5-25 mg total) by mouth nightly as needed (cough).  -     montelukast (SINGULAIR) 10 mg tablet; Take 1 tablet (10 mg total) by mouth every evening.  -     Discussed continuing OTC cough syrup PRN  -     Encouraged increasing fluids  -     Discussed using ibuprofen PRN discomfort/temp    Essential hypertension        -   BP controlled presently - reviewed anti-hypertensive regimen - continue current therapy        Health Maintenance       Date Due Completion Date    Fecal Occult Blood Test (FOBT)/FitKit 06/07/2019 6/7/2018    Shingles Vaccine (3 of 3) 07/09/2019 5/14/2019    Influenza Vaccine 08/01/2019 10/20/2018    Lipid Panel 04/30/2024 4/30/2019    TETANUS VACCINE 12/26/2024 12/26/2014            Health Maintenance reviewed, addressed as per orders    Follow-up: Follow up if symptoms worsen or fail to improve.    The patient expressed understanding and no barriers to adherence were identified.     1. The patient indicates understanding of these issues and agrees with the plan. Brief care plan is updated and reviewed with the patient as applicable.     2. The patient is given an After Visit Summary that lists all medications with directions, allergies, orders placed during this encounter and follow-up instructions.     3. I have reviewed the patient's medical information including past medical, family, and social history sections including the medications and allergies.     4. We discussed the patient's current medications. I reconciled the patient's medication list and prepared and supplied needed refills.       Nancy Hurtado, DNP, APRN, FNP-c  Family Medicine    My collaborating physicians are Dr. Gagna Aguilar and Dr. Robles Cook

## 2019-06-25 ENCOUNTER — TELEPHONE (OUTPATIENT)
Dept: FAMILY MEDICINE | Facility: CLINIC | Age: 74
End: 2019-06-25

## 2019-06-28 ENCOUNTER — LAB VISIT (OUTPATIENT)
Dept: LAB | Facility: HOSPITAL | Age: 74
End: 2019-06-28
Attending: INTERNAL MEDICINE
Payer: MEDICARE

## 2019-06-28 DIAGNOSIS — Z12.11 ENCOUNTER FOR FIT (FECAL IMMUNOCHEMICAL TEST) SCREENING: ICD-10-CM

## 2019-06-28 PROCEDURE — 82274 ASSAY TEST FOR BLOOD FECAL: CPT

## 2019-06-30 ENCOUNTER — PATIENT MESSAGE (OUTPATIENT)
Dept: FAMILY MEDICINE | Facility: CLINIC | Age: 74
End: 2019-06-30

## 2019-07-01 ENCOUNTER — PATIENT MESSAGE (OUTPATIENT)
Dept: ADMINISTRATIVE | Facility: OTHER | Age: 74
End: 2019-07-01

## 2019-07-01 LAB — HEMOCCULT STL QL IA: NEGATIVE

## 2019-07-01 NOTE — PROGRESS NOTES
Your stool results are normal.      Sincerely,  Bernard Davalos  [email or web address]

## 2019-07-08 ENCOUNTER — PATIENT OUTREACH (OUTPATIENT)
Dept: OTHER | Facility: OTHER | Age: 74
End: 2019-07-08

## 2019-07-08 NOTE — PROGRESS NOTES
Last 5 Patient Entered Readings                                      Current 30 Day Average: 130/73     Recent Readings 7/7/2019 7/5/2019 7/4/2019 7/2/2019 7/1/2019    SBP (mmHg) 127 123 133 133 134    DBP (mmHg) 70 69 69 76 71    Pulse 56 54 61 50 46        Digital Medicine: Health  Follow Up    Lifestyle Modifications:    1.Dietary Modifications (Sodium intake <2,000mg/day, food labels, dining out): No changes to diet.     2.Physical Activity: No changes to physical activity.     3.Medication Therapy: Patient has been compliant with the medication regimen.    4.Patient has the following medication side effects/concerns: None  (Frequency/Alleviating factors/Precipitating factors, etc.)     Follow up with Mr. Johnathan Waters completed. Mr. Waters is feeling well and is pleased with his BP overall. His 30 day BP average 130/73 mmHg is at goal, <130/80 mmHg. He has no new health goals to establish currently, and no questions for improving dietary habits or physical activity. Will continue to follow up to achieve health goals.

## 2019-08-05 ENCOUNTER — CLINICAL SUPPORT (OUTPATIENT)
Dept: FAMILY MEDICINE | Facility: CLINIC | Age: 74
End: 2019-08-05
Payer: MEDICARE

## 2019-08-05 DIAGNOSIS — Z23 NEED FOR ZOSTER VACCINE: Primary | ICD-10-CM

## 2019-08-05 PROCEDURE — 90750 HZV VACC RECOMBINANT IM: CPT | Mod: PBBFAC,PO

## 2019-08-05 PROCEDURE — 99499 NO LOS: ICD-10-PCS | Mod: S$PBB,,, | Performed by: INTERNAL MEDICINE

## 2019-08-05 PROCEDURE — 99499 UNLISTED E&M SERVICE: CPT | Mod: S$PBB,,, | Performed by: INTERNAL MEDICINE

## 2019-08-05 NOTE — PROGRESS NOTES
Second Shingrix vaccine given, patient advised to wait 15 mins.for  Monitoring.  Patient verbalized an understanding.

## 2019-08-25 ENCOUNTER — PATIENT MESSAGE (OUTPATIENT)
Dept: FAMILY MEDICINE | Facility: CLINIC | Age: 74
End: 2019-08-25

## 2019-10-01 ENCOUNTER — TELEPHONE (OUTPATIENT)
Dept: FAMILY MEDICINE | Facility: CLINIC | Age: 74
End: 2019-10-01

## 2019-10-01 ENCOUNTER — NURSE TRIAGE (OUTPATIENT)
Dept: ADMINISTRATIVE | Facility: CLINIC | Age: 74
End: 2019-10-01

## 2019-10-01 ENCOUNTER — OFFICE VISIT (OUTPATIENT)
Dept: FAMILY MEDICINE | Facility: CLINIC | Age: 74
End: 2019-10-01
Payer: MEDICARE

## 2019-10-01 VITALS
WEIGHT: 174.63 LBS | OXYGEN SATURATION: 95 % | SYSTOLIC BLOOD PRESSURE: 118 MMHG | RESPIRATION RATE: 16 BRPM | TEMPERATURE: 98 F | HEIGHT: 67 IN | HEART RATE: 52 BPM | DIASTOLIC BLOOD PRESSURE: 68 MMHG | BODY MASS INDEX: 27.41 KG/M2

## 2019-10-01 DIAGNOSIS — I10 ESSENTIAL HYPERTENSION: Chronic | ICD-10-CM

## 2019-10-01 DIAGNOSIS — Z23 INFLUENZA VACCINE NEEDED: ICD-10-CM

## 2019-10-01 DIAGNOSIS — L03.113 CELLULITIS OF RIGHT FOREARM: Primary | ICD-10-CM

## 2019-10-01 PROBLEM — M65.341 TRIGGER RING FINGER OF RIGHT HAND: Status: RESOLVED | Noted: 2019-01-30 | Resolved: 2019-10-01

## 2019-10-01 PROCEDURE — 99214 OFFICE O/P EST MOD 30 MIN: CPT | Mod: PBBFAC,PO | Performed by: NURSE PRACTITIONER

## 2019-10-01 PROCEDURE — 99214 PR OFFICE/OUTPT VISIT, EST, LEVL IV, 30-39 MIN: ICD-10-PCS | Mod: S$PBB,,, | Performed by: NURSE PRACTITIONER

## 2019-10-01 PROCEDURE — 99999 PR PBB SHADOW E&M-EST. PATIENT-LVL IV: ICD-10-PCS | Mod: PBBFAC,,, | Performed by: NURSE PRACTITIONER

## 2019-10-01 PROCEDURE — 99214 OFFICE O/P EST MOD 30 MIN: CPT | Mod: S$PBB,,, | Performed by: NURSE PRACTITIONER

## 2019-10-01 PROCEDURE — 99999 PR PBB SHADOW E&M-EST. PATIENT-LVL IV: CPT | Mod: PBBFAC,,, | Performed by: NURSE PRACTITIONER

## 2019-10-01 PROCEDURE — 90662 IIV NO PRSV INCREASED AG IM: CPT | Mod: PBBFAC,PO

## 2019-10-01 RX ORDER — CEPHALEXIN 500 MG/1
500 CAPSULE ORAL 4 TIMES DAILY
Qty: 20 CAPSULE | Refills: 0 | Status: SHIPPED | OUTPATIENT
Start: 2019-10-01 | End: 2019-10-01 | Stop reason: SINTOL

## 2019-10-01 RX ORDER — MOMETASONE FUROATE 1 MG/G
CREAM TOPICAL
COMMUNITY
Start: 2019-09-13 | End: 2021-07-30 | Stop reason: SDUPTHER

## 2019-10-01 RX ORDER — CLINDAMYCIN HYDROCHLORIDE 300 MG/1
300 CAPSULE ORAL 4 TIMES DAILY
Qty: 20 CAPSULE | Refills: 0 | Status: SHIPPED | OUTPATIENT
Start: 2019-10-01 | End: 2019-10-06

## 2019-10-01 RX ORDER — CLINDAMYCIN HYDROCHLORIDE 300 MG/1
300 CAPSULE ORAL 4 TIMES DAILY
Qty: 20 CAPSULE | Refills: 0 | Status: CANCELLED | OUTPATIENT
Start: 2019-10-01 | End: 2019-10-06

## 2019-10-01 NOTE — PROGRESS NOTES
4:15pm 10/01/2019    Patient returned to the clinic for re-evaluation following taking his first dose of keflex. He reports that the rash to his back and sides was urticarial in nature. He denies SOB, chest tightness, difficulty swallowing/breathing, fever. He reports that he has taken Keflex in the past without issue. I will d/c keflex at this time and started clindamycin. Discussed strict RTC precautions with patient, if any SOB, wheezing, chest tightness, difficulty swallowing/breathing to seek care immediately.     It should be noted that he also had a flu shot today.     Physical Exam   Skin: Skin is warm, dry and intact. Rash noted. Rash is maculopapular. Rash is not urticarial.

## 2019-10-01 NOTE — TELEPHONE ENCOUNTER
Patient's wife answered the phone and stated that the patient was having a reaction to either the medication he started or the flu shot that he had today. Unable to speak with patient because he was not with her. Patient was on his way back into the clinic because he had hives. Unable to confirm any information with her. No other questions verbalized by wife.     Reason for Disposition   Information only question and nurse able to answer    Protocols used: ST NO PROTOCOL AVAILABLE - INFORMATION ONLY-A-[place]

## 2019-10-01 NOTE — PROGRESS NOTES
Subjective:       Chief Complaint  Chief Complaint   Patient presents with    Arm Pain     Right arm        HPI  Johnathan Waters is a 74 y.o. male with multiple medical diagnoses as listed in the medical history and problem list that presents for right forearm redness/tenderness for 3-4 days. He reports that the area of redness has been growing over the last 2-3 days and it is becoming more tender. He reports that there was a miniscule amount of drainage. Denies fever, chills, nausea, vomiting.       Patient Care Team:  Bernard Davalos MD as PCP - General (Internal Medicine)  Bernard Davalos MD as PCP - MSSP Attributed  Bryn ClementD as Hypertension Digital Medicine Clinician  Bernard Davalos MD as Hypertension Digital Medicine Responsible Provider (Internal Medicine)  Emelia Espino as Digital Medicine Health   Medicare [employer] as Hypertension Digital Medicine Contract      PAST MEDICAL HISTORY:  Past Medical History:   Diagnosis Date    Elevated cholesterol     Hypertension     Squamous cell carcinoma     under right eye.  Sees outside Dermatology    Trigger ring finger of right hand 1/30/2019       PAST SURGICAL HISTORY:  Past Surgical History:   Procedure Laterality Date    EYE SURGERY      VASECTOMY         SOCIAL HISTORY:  Social History     Socioeconomic History    Marital status:      Spouse name: Not on file    Number of children: Not on file    Years of education: Not on file    Highest education level: Not on file   Occupational History    Not on file   Social Needs    Financial resource strain: Not on file    Food insecurity:     Worry: Not on file     Inability: Not on file    Transportation needs:     Medical: Not on file     Non-medical: Not on file   Tobacco Use    Smoking status: Never Smoker    Smokeless tobacco: Never Used   Substance and Sexual Activity    Alcohol use: No    Drug use: No    Sexual activity: Yes     Partners:  Female     Comment:    Lifestyle    Physical activity:     Days per week: Not on file     Minutes per session: Not on file    Stress: Not on file   Relationships    Social connections:     Talks on phone: Not on file     Gets together: Not on file     Attends Yarsanism service: Not on file     Active member of club or organization: Not on file     Attends meetings of clubs or organizations: Not on file     Relationship status: Not on file   Other Topics Concern    Not on file   Social History Narrative    Not on file       FAMILY HISTORY:  Family History   Problem Relation Age of Onset    Hypertension Mother     Cancer Father         lung    Diabetes Sister        ALLERGIES AND MEDICATIONS: updated and reviewed.  Review of patient's allergies indicates:   Allergen Reactions    Bee sting [allergen ext-venom-honey bee]     Sulfa (sulfonamide antibiotics) Other (See Comments)     Patient unsure     Current Outpatient Medications   Medication Sig Dispense Refill    lisinopril-hydrochlorothiazide (PRINZIDE,ZESTORETIC) 10-12.5 mg per tablet Take 1 tablet by mouth once daily. 90 tablet 3    mometasone 0.1% (ELOCON) 0.1 % cream       pravastatin (PRAVACHOL) 20 MG tablet Take 1 tablet (20 mg total) by mouth once daily. 90 tablet 3    tamsulosin (FLOMAX) 0.4 mg Cap Take 1 capsule (0.4 mg total) by mouth once daily. 90 capsule 3    cephALEXin (KEFLEX) 500 MG capsule Take 1 capsule (500 mg total) by mouth 4 (four) times daily. for 5 days 20 capsule 0     No current facility-administered medications for this visit.          ROS  Review of Systems   Constitutional: Negative for chills and fever.   Respiratory: Negative for shortness of breath and wheezing.    Cardiovascular: Negative for chest pain.   Gastrointestinal: Negative for constipation, diarrhea, nausea and vomiting.   Skin: Positive for color change and wound.   Neurological: Negative for dizziness and headaches.         Objective:       Physical  "Exam  Vitals:    10/01/19 0904 10/01/19 0929   BP: (!) 140/70 110/64   BP Location: Left arm Left arm   Patient Position: Sitting Sitting   BP Method: Small (Manual) Small (Manual)   Pulse: (!) 57    Resp: 16    Temp: 97.7 °F (36.5 °C)    TempSrc: Oral    SpO2: 96%    Weight: 79.2 kg (174 lb 9.7 oz)    Height: 5' 7" (1.702 m)     Body mass index is 27.35 kg/m².  Weight: 79.2 kg (174 lb 9.7 oz)   Height: 5' 7" (170.2 cm)   Physical Exam   Constitutional: He is oriented to person, place, and time. He appears well-developed and well-nourished.   HENT:   Head: Normocephalic and atraumatic.   Mouth/Throat: Oropharynx is clear and moist.   Cardiovascular: Normal rate and regular rhythm.   Pulmonary/Chest: Effort normal and breath sounds normal.   Neurological: He is alert and oriented to person, place, and time.   Skin: Skin is warm, dry and intact. Capillary refill takes less than 2 seconds. No rash noted. There is erythema (as depicted, with <0.5cm round raised area).        Psychiatric: He has a normal mood and affect. His behavior is normal.   Vitals reviewed.    Assessment:     1. Cellulitis of right forearm    2. Influenza vaccine needed    3. Essential hypertension      Plan:     Johnathan Manning was seen today for arm pain.    Diagnoses and all orders for this visit:    Cellulitis of right forearm  -     cephALEXin (KEFLEX) 500 MG capsule; Take 1 capsule (500 mg total) by mouth 4 (four) times daily. for 5 days  -     Keep area clean    Influenza vaccine needed  -     Influenza - High Dose (65+) (PF) (IM)  -     Counseled regarding seasonal influenza vaccination - administered    Essential hypertension        -    BP controlled presently - reviewed anti-hypertensive regimen - continue current therapy      Health Maintenance       Date Due Completion Date    Influenza Vaccine (1) 09/01/2019 10/20/2018    Fecal Occult Blood Test (FOBT)/[employer] 06/28/2020 6/28/2019    Lipid Panel 04/30/2024 4/30/2019    TETANUS VACCINE " 12/26/2024 12/26/2014            Health Maintenance reviewed, addressed as per orders    Follow up if symptoms worsen or fail to improve.    The patient expressed understanding and no barriers to adherence were identified.     1. The patient indicates understanding of these issues and agrees with the plan. Brief care plan is updated and reviewed with the patient as applicable.     2. The patient is given an After Visit Summary that lists all medications with directions, allergies, orders placed during this encounter and follow-up instructions.     3. I have reviewed the patient's medical information including past medical, family, and social history sections including the medications and allergies.     4. We discussed the patient's current medications. I reconciled the patient's medication list and prepared and supplied needed refills.       Nancy Hurtado, DNP, APRN, FNP-c  Family Medicine    My collaborating physicians are Dr. Gagan Aguilar and Dr. Robles Cook

## 2019-10-01 NOTE — TELEPHONE ENCOUNTER
Patient called stating he took 1 dose of ceflaxin 2 hours later he broke out in a rash to sides & back. Patient advised to come in to assess rash.WENDI

## 2019-10-02 ENCOUNTER — PATIENT MESSAGE (OUTPATIENT)
Dept: FAMILY MEDICINE | Facility: CLINIC | Age: 74
End: 2019-10-02

## 2019-11-11 NOTE — PROGRESS NOTES
Digital Medicine: Clinician Follow-Up    Patient reports occasional lightheadedness if he changes positions too quickly    The history is provided by the patient.     Follow Up  Follow-up reason(s): reading review          INTERVENTION(S)  reviewed appropriate dose schedule and encouragement/support    PLAN  patient verbalizes understanding    BP at goal  Reviewed s/s of hypotension and advised patient to contact me if experiencing. Discussed changing positions slowly.  CMP 4/2019 WNL.  Continue current regimen      There are no preventive care reminders to display for this patient.    Last 5 Patient Entered Readings                                      Current 30 Day Average: 125/70     Recent Readings 11/10/2019 11/9/2019 11/9/2019 11/7/2019 11/6/2019    SBP (mmHg) 109 128 134 112 137    DBP (mmHg) 60 67 74 64 74    Pulse 61 53 51 56 59             Hypertension Medications             lisinopril-hydrochlorothiazide (PRINZIDE,ZESTORETIC) 10-12.5 mg per tablet Take 1 tablet by mouth once daily.                             Medication Adherence Screening     He does not wonder if medications are working.  He knows purpose of medications.

## 2019-11-19 ENCOUNTER — PATIENT OUTREACH (OUTPATIENT)
Dept: OTHER | Facility: OTHER | Age: 74
End: 2019-11-19

## 2019-11-19 NOTE — PROGRESS NOTES
Digital Medicine: Health  Follow-Up    Introduced myself as patient's new health .  He stated that he is feeling fine and does not know why his blood pressure has been higher.  I asked when patient had last charged his digital cuff.  He said he did not know, but thinks that it is about 80% charged.  He stated that he will make sure it is properly charged.  Patient stated that he is not interested in working on additional health goals. When asked about diet and exercise patient was brief and general with answers. I will continue to monitor and follow up with clinical health goals.    The history is provided by the patient. No  was used.     Follow Up  Follow-up reason(s): reading review      Readings are trending up       INTERVENTION(S)  reviewed monitoring technique and encouragement/support    PLAN  patient verbalizes understanding and continue monitoring      There are no preventive care reminders to display for this patient.    Last 5 Patient Entered Readings                                      Current 30 Day Average: 127/72     Recent Readings 11/18/2019 11/18/2019 11/18/2019 11/15/2019 11/15/2019    SBP (mmHg) 144 139 147 128 140    DBP (mmHg) 72 82 81 81 81    Pulse 57 51 49 49 50                      Diet Screening   No change to diet.  He has the following dietary restrictions: low sodium diet    Patient says that he continues to eat a low sodium diet.    Physical Activity Screening   No change to exercise routine.    When asked if exercising, patient responded: no    Medication Adherence Screening   He misses doses: never    Patient is not selectively taking diuretics.    He does not wonder if medications are working.  He knows purpose of medications.        SDOH

## 2019-11-20 ENCOUNTER — PATIENT MESSAGE (OUTPATIENT)
Dept: FAMILY MEDICINE | Facility: CLINIC | Age: 74
End: 2019-11-20

## 2020-01-06 ENCOUNTER — PATIENT MESSAGE (OUTPATIENT)
Dept: ORTHOPEDICS | Facility: CLINIC | Age: 75
End: 2020-01-06

## 2020-01-06 ENCOUNTER — PATIENT MESSAGE (OUTPATIENT)
Dept: ADMINISTRATIVE | Facility: OTHER | Age: 75
End: 2020-01-06

## 2020-01-07 DIAGNOSIS — M79.675 PAIN OF TOE OF LEFT FOOT: Primary | ICD-10-CM

## 2020-01-07 DIAGNOSIS — M65.341 TRIGGER RING FINGER OF RIGHT HAND: Primary | ICD-10-CM

## 2020-01-09 ENCOUNTER — OFFICE VISIT (OUTPATIENT)
Dept: ORTHOPEDICS | Facility: CLINIC | Age: 75
End: 2020-01-09
Attending: ORTHOPAEDIC SURGERY
Payer: MEDICARE

## 2020-01-09 VITALS
RESPIRATION RATE: 18 BRPM | WEIGHT: 181.88 LBS | HEIGHT: 67 IN | DIASTOLIC BLOOD PRESSURE: 78 MMHG | SYSTOLIC BLOOD PRESSURE: 122 MMHG | BODY MASS INDEX: 28.55 KG/M2

## 2020-01-09 DIAGNOSIS — M20.22 ACQUIRED HALLUX RIGIDUS OF LEFT FOOT: ICD-10-CM

## 2020-01-09 DIAGNOSIS — M65.341 TRIGGER RING FINGER OF RIGHT HAND: Primary | ICD-10-CM

## 2020-01-09 PROCEDURE — 1159F MED LIST DOCD IN RCRD: CPT | Mod: ,,, | Performed by: ORTHOPAEDIC SURGERY

## 2020-01-09 PROCEDURE — 99999 PR PBB SHADOW E&M-EST. PATIENT-LVL III: CPT | Mod: PBBFAC,,, | Performed by: ORTHOPAEDIC SURGERY

## 2020-01-09 PROCEDURE — 99213 OFFICE O/P EST LOW 20 MIN: CPT | Mod: 25,S$PBB,, | Performed by: ORTHOPAEDIC SURGERY

## 2020-01-09 PROCEDURE — 1125F AMNT PAIN NOTED PAIN PRSNT: CPT | Mod: ,,, | Performed by: ORTHOPAEDIC SURGERY

## 2020-01-09 PROCEDURE — 99213 OFFICE O/P EST LOW 20 MIN: CPT | Mod: PBBFAC,25,PN | Performed by: ORTHOPAEDIC SURGERY

## 2020-01-09 PROCEDURE — 99999 PR PBB SHADOW E&M-EST. PATIENT-LVL III: ICD-10-PCS | Mod: PBBFAC,,, | Performed by: ORTHOPAEDIC SURGERY

## 2020-01-09 PROCEDURE — 99213 PR OFFICE/OUTPT VISIT, EST, LEVL III, 20-29 MIN: ICD-10-PCS | Mod: 25,S$PBB,, | Performed by: ORTHOPAEDIC SURGERY

## 2020-01-09 PROCEDURE — 1159F PR MEDICATION LIST DOCUMENTED IN MEDICAL RECORD: ICD-10-PCS | Mod: ,,, | Performed by: ORTHOPAEDIC SURGERY

## 2020-01-09 PROCEDURE — 1125F PR PAIN SEVERITY QUANTIFIED, PAIN PRESENT: ICD-10-PCS | Mod: ,,, | Performed by: ORTHOPAEDIC SURGERY

## 2020-01-09 NOTE — PROGRESS NOTES
"Follow up visit    History of Present Illness:   Johnathan Manning comes to the office for follow up evaluation of right ring finger trigger finger.  Recommended treatment at the last visit included injection - relieved symptoms for about 1 year.  Since the last visit his pain has returned     Also complaining of foot pain - Sunday noted pain in his great toe "knuckle" that flared up suddenly without trauma. He was able to bear weight. Pain has improved but not resolved. Did note some erythema around the joint but minimal swelling.  Had been diagnosed with gout many years ago due to elevated uric acid but never had a joint aspiration showing crystals.  He was on gout medication for.  Time which made his pain worse.  His symptoms went away when he stops this treatment.  His current flare of pain does not sound consistent with gout.    ROS: unremarkable and no change since last visit    Physical Examination:    NAD  Right hand   Trigger finger of right ring finger with associated palpable painful nodule at the A1 pulley  LTSI m/u/r  2+ RP  + EPL, IO, FDS, FDP    Left foot  Tenderness over 1st MTP  No associated swelling or erythema  Limited range of motion particularly with dorsiflexion.  He only has about 5° of dorsiflexion and 20° of plantar flexion. This is nonpainful  Ltsi s/s/sp/dp/t  + ehl/fhl/ta/gs  2+ DP    Radiographic imaging:  Three views of right hand are negative for any new changes  Three views of the left foot are positive for degenerative changes of the 1st MTP joint    Assessment/Plan:  74 y.o. male  with Right ring trigger finger, Left hallux rigidus    We discussed the etiology of persistent pain and further treatment options.  Injection of the right trigger finger - ring  performed, please see procedure note for more details.  Prior to the injection risks and benefits of corticosteroid injection were discussed with the patient including pain, infection, bleeding, skin color changes, swelling, steroid " flare. We discussed that over time injections can result in chondral damage, acceleration of arthritis formation, damage to tendons and damage to joints.  The patient consented for the procedure.  Post-injection instructions were given to the patient in writing.  Will observe great toe- treat flares symptomatically.  Recommended starting with OTC NSAIDs, rest, ice as needed for flares.  If this does not control his pain we can consider a carbon shank with De La Cruz's extension    All questions were answered in detail. The patient  verbalized the understanding of the treatment plan and is in full agreement with the treatment plan.

## 2020-01-13 DIAGNOSIS — N40.1 BENIGN NON-NODULAR PROSTATIC HYPERPLASIA WITH LOWER URINARY TRACT SYMPTOMS: Chronic | ICD-10-CM

## 2020-01-13 DIAGNOSIS — I10 ESSENTIAL HYPERTENSION: Chronic | ICD-10-CM

## 2020-01-13 RX ORDER — LISINOPRIL AND HYDROCHLOROTHIAZIDE 10; 12.5 MG/1; MG/1
1 TABLET ORAL DAILY
Qty: 90 TABLET | Refills: 3 | Status: SHIPPED | OUTPATIENT
Start: 2020-01-13 | End: 2020-06-11

## 2020-01-13 RX ORDER — TAMSULOSIN HYDROCHLORIDE 0.4 MG/1
0.4 CAPSULE ORAL DAILY
Qty: 90 CAPSULE | Refills: 3 | Status: SHIPPED | OUTPATIENT
Start: 2020-01-13 | End: 2020-07-24 | Stop reason: SDUPTHER

## 2020-01-13 NOTE — TELEPHONE ENCOUNTER
----- Message from Lyudmila Silva sent at 1/13/2020  2:55 PM CST -----  Contact: Stacia Waters (wife)  Type: RX Refill Request    Who Called: Stacia Waters (wife)    RX Name and Strength:   lisinopril-hydrochlorothiazide (PRINZIDE,ZESTORETIC) 10-12.5 mg per tablet  tamsulosin (FLOMAX) 0.4 mg Cap    Preferred Pharmacy with phone number: [facility] [address] [address] # [phone]    Best Call Back Number: [phone]    Additional Information: She states that she would like a call when it is sent over.

## 2020-01-20 ENCOUNTER — PATIENT OUTREACH (OUTPATIENT)
Dept: OTHER | Facility: OTHER | Age: 75
End: 2020-01-20

## 2020-01-20 NOTE — PROGRESS NOTES
Digital Medicine: Health  Follow-Up    Patient states that he has increased stress with traveling for work that my be contributing to his higher BP readings.  He denies any symptoms of hypertension.    The history is provided by the patient. No  was used.     Follow Up  Follow-up reason(s): reading review      Readings are trending up       INTERVENTION(S)  encouragement/support, denied resources and denied questions    PLAN  patient verbalizes understanding and continue monitoring    Patient remains fairly controlled.  Will follow up in 6 - 8 weeks or sooner if BP trends up or down.      There are no preventive care reminders to display for this patient.    Last 5 Patient Entered Readings                                      Current 30 Day Average: 131/73     Recent Readings 1/19/2020 1/19/2020 1/18/2020 1/18/2020 1/17/2020    SBP (mmHg) 137 148 143 143 144    DBP (mmHg) 73 75 79 70 76    Pulse 49 52 53 53 50                      Diet Screening   No change to diet.  He has the following dietary restrictions: low sodium diet    Assigning the following patient goals: maintain low sodium diet    Physical Activity Screening   No change to exercise routine.    When asked if exercising, patient responded: no    Medication Adherence Screening   He misses doses: never    Patient is not selectively taking diuretics.    He does not wonder if medications are working.  He knows purpose of medications.        SDOH

## 2020-01-21 NOTE — PROGRESS NOTES
Upward trend in BP noted  Health  spoke with patient 1/20/20  He attributes higher readings to increased stress with traveling for work  Average acceptable in 73 yo patient, 131/73 mmHg  Monitor more closely    Hypertension Medications             lisinopril-hydrochlorothiazide (PRINZIDE,ZESTORETIC) 10-12.5 mg per tablet Take 1 tablet by mouth once daily.

## 2020-03-12 ENCOUNTER — PATIENT MESSAGE (OUTPATIENT)
Dept: FAMILY MEDICINE | Facility: CLINIC | Age: 75
End: 2020-03-12

## 2020-03-16 ENCOUNTER — PATIENT OUTREACH (OUTPATIENT)
Dept: OTHER | Facility: OTHER | Age: 75
End: 2020-03-16

## 2020-03-16 NOTE — PROGRESS NOTES
Digital Medicine: Health  Follow-Up    Called patient for health  follow up.  Patient says he fine and does not need anything.  He hung up after.  Patient is well controlled.  I will address lifestyle at next outreach.    The history is provided by the patient. No  was used.     Follow Up  Follow-up reason(s): reading review          INTERVENTION(S)  denied further coaching, denied resources, denied support and denied questions    PLAN  continue monitoring      There are no preventive care reminders to display for this patient.    Last 5 Patient Entered Readings                                      Current 30 Day Average: 131/72     Recent Readings 3/15/2020 3/14/2020 3/12/2020 3/12/2020 3/11/2020    SBP (mmHg) 122 124 125 138 136    DBP (mmHg) 67 64 65 76 76    Pulse 59 59 59 51 51                  Screenings    SDOH

## 2020-04-15 ENCOUNTER — PATIENT MESSAGE (OUTPATIENT)
Dept: FAMILY MEDICINE | Facility: CLINIC | Age: 75
End: 2020-04-15

## 2020-05-13 ENCOUNTER — PATIENT OUTREACH (OUTPATIENT)
Dept: OTHER | Facility: OTHER | Age: 75
End: 2020-05-13

## 2020-05-13 NOTE — PROGRESS NOTES
"Digital Medicine: Clinician Follow-Up    Called patient for BP follow up. He is doing well with no complaints. Patient reports that he stopped traveling for work and his BP has been better. He contributes this to not eating out as much. Patient reports that last Sunday he did a 25 mile bike ride.  He thinks that these lifestyle changes have helped with his BP and he is pleased with the downward trend. He says that occasionally he feels a "little dizzy" when transitioning from sitting to standing, but is not bothered by this. Otherwise, he denies symptoms of hypotension.     The history is provided by the patient.     Follow Up  Follow-up reason(s): reading review and routine education      Readings are trending down due to lifestyle change and medication adherence.  Patient's 30-day BP average is at goal of <130/<80 mmHg.       INTERVENTION(S)  reviewed appropriate dose schedule    PLAN  patient verbalizes understanding and continue monitoring    Continue lisinopril/ HCTZ.     Monitor and report s/sx of hypotension. Informed patient to especially mindful of this with increased physical activity and sodium monitoring.     Patient due for updated BMP and annual PCP visit. He will schedule for 07/2020      There are no preventive care reminders to display for this patient.    Last 5 Patient Entered Readings                                      Current 30 Day Average: 120/67     Recent Readings 5/13/2020 5/12/2020 5/12/2020 5/12/2020 5/11/2020    SBP (mmHg) 116 116 112 126 113    DBP (mmHg) 70 59 60 70 64    Pulse 53 55 53 44 54        Hypertension Medications     lisinopril-hydrochlorothiazide (PRINZIDE,ZESTORETIC) 10-12.5 mg per tablet Take 1 tablet by mouth once daily.                 Screenings  "

## 2020-05-27 ENCOUNTER — PATIENT MESSAGE (OUTPATIENT)
Dept: FAMILY MEDICINE | Facility: CLINIC | Age: 75
End: 2020-05-27

## 2020-05-27 DIAGNOSIS — E78.5 HYPERLIPIDEMIA, UNSPECIFIED HYPERLIPIDEMIA TYPE: ICD-10-CM

## 2020-05-27 RX ORDER — PRAVASTATIN SODIUM 20 MG/1
20 TABLET ORAL DAILY
Qty: 90 TABLET | Refills: 3 | Status: SHIPPED | OUTPATIENT
Start: 2020-05-27 | End: 2020-07-24 | Stop reason: SDUPTHER

## 2020-05-28 ENCOUNTER — PATIENT MESSAGE (OUTPATIENT)
Dept: ADMINISTRATIVE | Facility: OTHER | Age: 75
End: 2020-05-28

## 2020-05-31 PROCEDURE — 99454 REM MNTR PHYSIOL PARAM 16-30: CPT | Mod: PBBFAC,PO | Performed by: INTERNAL MEDICINE

## 2020-06-11 DIAGNOSIS — I10 ESSENTIAL HYPERTENSION: Chronic | ICD-10-CM

## 2020-06-11 RX ORDER — LISINOPRIL AND HYDROCHLOROTHIAZIDE 10; 12.5 MG/1; MG/1
TABLET ORAL
Qty: 90 TABLET | Refills: 0 | Status: SHIPPED | OUTPATIENT
Start: 2020-06-11 | End: 2020-07-24 | Stop reason: SDUPTHER

## 2020-06-15 ENCOUNTER — PATIENT OUTREACH (OUTPATIENT)
Dept: OTHER | Facility: OTHER | Age: 75
End: 2020-06-15

## 2020-06-15 NOTE — PROGRESS NOTES
Digital Medicine: Health  Follow-Up    Called patient for routine follow up.  He states he is doing well and is happy with his blood pressure readings.      The history is provided by the patient. No  was used.   Follow Up  Follow-up reason(s): reading review          INTERVENTION(S)  denied resources and denied questions    PLAN  continue monitoring      There are no preventive care reminders to display for this patient.    Last 5 Patient Entered Readings                                      Current 30 Day Average: 120/67     Recent Readings 6/15/2020 6/14/2020 6/12/2020 6/11/2020 6/11/2020    SBP (mmHg) 123 119 117 120 124    DBP (mmHg) 68 68 67 61 65    Pulse 49 49 47 54 49                      Diet Screening   No change to diet.  Patient reports eating or drinking the following: fruit, water, fresh vegetables and lean protiens, grainsHe has the following dietary restrictions: low sodium dietHe has meals prepared by family.    Patient does the shopping for groceries and lets family grocery shop.  He gets groceries from the grocery store.      Barriers to a Healthy Diet: no barriers to healthy eating    Assigning the following patient goals: maintain low sodium diet    Physical Activity Screening   No change to exercise routine.    When asked if exercising, patient responded: yes    Medication Adherence Screening   He did not miss a dose this month.  Patient knows purpose of medications.      Patient identified the following reasons for non-compliance: None      SDOH

## 2020-06-29 PROCEDURE — 99454 REM MNTR PHYSIOL PARAM 16-30: CPT | Mod: PBBFAC,PO | Performed by: INTERNAL MEDICINE

## 2020-07-20 ENCOUNTER — LAB VISIT (OUTPATIENT)
Dept: LAB | Facility: HOSPITAL | Age: 75
End: 2020-07-20
Attending: INTERNAL MEDICINE
Payer: MEDICARE

## 2020-07-20 DIAGNOSIS — I10 ESSENTIAL HYPERTENSION: Chronic | ICD-10-CM

## 2020-07-20 DIAGNOSIS — M10.9 GOUTY ARTHRITIS OF GREAT TOE: ICD-10-CM

## 2020-07-20 LAB
ALBUMIN SERPL BCP-MCNC: 3.7 G/DL (ref 3.5–5.2)
ALP SERPL-CCNC: 50 U/L (ref 55–135)
ALT SERPL W/O P-5'-P-CCNC: 18 U/L (ref 10–44)
ANION GAP SERPL CALC-SCNC: 8 MMOL/L (ref 8–16)
AST SERPL-CCNC: 16 U/L (ref 10–40)
BASOPHILS # BLD AUTO: 0.09 K/UL (ref 0–0.2)
BASOPHILS NFR BLD: 1.2 % (ref 0–1.9)
BILIRUB SERPL-MCNC: 0.8 MG/DL (ref 0.1–1)
BUN SERPL-MCNC: 16 MG/DL (ref 8–23)
CALCIUM SERPL-MCNC: 9 MG/DL (ref 8.7–10.5)
CHLORIDE SERPL-SCNC: 105 MMOL/L (ref 95–110)
CHOLEST SERPL-MCNC: 161 MG/DL (ref 120–199)
CHOLEST/HDLC SERPL: 3.3 {RATIO} (ref 2–5)
CO2 SERPL-SCNC: 28 MMOL/L (ref 23–29)
CREAT SERPL-MCNC: 1 MG/DL (ref 0.5–1.4)
DIFFERENTIAL METHOD: ABNORMAL
EOSINOPHIL # BLD AUTO: 0.4 K/UL (ref 0–0.5)
EOSINOPHIL NFR BLD: 4.9 % (ref 0–8)
ERYTHROCYTE [DISTWIDTH] IN BLOOD BY AUTOMATED COUNT: 13.2 % (ref 11.5–14.5)
EST. GFR  (AFRICAN AMERICAN): >60 ML/MIN/1.73 M^2
EST. GFR  (NON AFRICAN AMERICAN): >60 ML/MIN/1.73 M^2
GLUCOSE SERPL-MCNC: 88 MG/DL (ref 70–110)
HCT VFR BLD AUTO: 40.4 % (ref 40–54)
HDLC SERPL-MCNC: 49 MG/DL (ref 40–75)
HDLC SERPL: 30.4 % (ref 20–50)
HGB BLD-MCNC: 13.5 G/DL (ref 14–18)
IMM GRANULOCYTES # BLD AUTO: 0.01 K/UL (ref 0–0.04)
IMM GRANULOCYTES NFR BLD AUTO: 0.1 % (ref 0–0.5)
LDLC SERPL CALC-MCNC: 97.4 MG/DL (ref 63–159)
LYMPHOCYTES # BLD AUTO: 1.7 K/UL (ref 1–4.8)
LYMPHOCYTES NFR BLD: 22.1 % (ref 18–48)
MCH RBC QN AUTO: 29.5 PG (ref 27–31)
MCHC RBC AUTO-ENTMCNC: 33.4 G/DL (ref 32–36)
MCV RBC AUTO: 88 FL (ref 82–98)
MONOCYTES # BLD AUTO: 0.8 K/UL (ref 0.3–1)
MONOCYTES NFR BLD: 11.1 % (ref 4–15)
NEUTROPHILS # BLD AUTO: 4.6 K/UL (ref 1.8–7.7)
NEUTROPHILS NFR BLD: 60.6 % (ref 38–73)
NONHDLC SERPL-MCNC: 112 MG/DL
NRBC BLD-RTO: 0 /100 WBC
PLATELET # BLD AUTO: 266 K/UL (ref 150–350)
PMV BLD AUTO: 12.4 FL (ref 9.2–12.9)
POTASSIUM SERPL-SCNC: 3.7 MMOL/L (ref 3.5–5.1)
PROT SERPL-MCNC: 6.5 G/DL (ref 6–8.4)
RBC # BLD AUTO: 4.57 M/UL (ref 4.6–6.2)
SODIUM SERPL-SCNC: 141 MMOL/L (ref 136–145)
TRIGL SERPL-MCNC: 73 MG/DL (ref 30–150)
URATE SERPL-MCNC: 6.7 MG/DL (ref 3.4–7)
WBC # BLD AUTO: 7.56 K/UL (ref 3.9–12.7)

## 2020-07-20 PROCEDURE — 36415 COLL VENOUS BLD VENIPUNCTURE: CPT | Mod: PO

## 2020-07-20 PROCEDURE — 80061 LIPID PANEL: CPT

## 2020-07-20 PROCEDURE — 80053 COMPREHEN METABOLIC PANEL: CPT

## 2020-07-20 PROCEDURE — 84550 ASSAY OF BLOOD/URIC ACID: CPT

## 2020-07-20 PROCEDURE — 85025 COMPLETE CBC W/AUTO DIFF WBC: CPT

## 2020-07-24 ENCOUNTER — OFFICE VISIT (OUTPATIENT)
Dept: FAMILY MEDICINE | Facility: CLINIC | Age: 75
End: 2020-07-24
Payer: MEDICARE

## 2020-07-24 VITALS
HEIGHT: 67 IN | SYSTOLIC BLOOD PRESSURE: 126 MMHG | WEIGHT: 171.19 LBS | BODY MASS INDEX: 26.87 KG/M2 | HEART RATE: 55 BPM | OXYGEN SATURATION: 99 % | TEMPERATURE: 98 F | DIASTOLIC BLOOD PRESSURE: 78 MMHG

## 2020-07-24 DIAGNOSIS — N40.1 BENIGN NON-NODULAR PROSTATIC HYPERPLASIA WITH LOWER URINARY TRACT SYMPTOMS: Chronic | ICD-10-CM

## 2020-07-24 DIAGNOSIS — E78.5 HYPERLIPIDEMIA, UNSPECIFIED HYPERLIPIDEMIA TYPE: ICD-10-CM

## 2020-07-24 DIAGNOSIS — I10 ESSENTIAL HYPERTENSION: Primary | Chronic | ICD-10-CM

## 2020-07-24 DIAGNOSIS — Z12.11 ENCOUNTER FOR FIT (FECAL IMMUNOCHEMICAL TEST) SCREENING: ICD-10-CM

## 2020-07-24 PROCEDURE — 99214 OFFICE O/P EST MOD 30 MIN: CPT | Mod: S$PBB,,, | Performed by: INTERNAL MEDICINE

## 2020-07-24 PROCEDURE — 99999 PR PBB SHADOW E&M-EST. PATIENT-LVL III: CPT | Mod: PBBFAC,,, | Performed by: INTERNAL MEDICINE

## 2020-07-24 PROCEDURE — 99999 PR PBB SHADOW E&M-EST. PATIENT-LVL III: ICD-10-PCS | Mod: PBBFAC,,, | Performed by: INTERNAL MEDICINE

## 2020-07-24 PROCEDURE — 99213 OFFICE O/P EST LOW 20 MIN: CPT | Mod: PBBFAC,PO | Performed by: INTERNAL MEDICINE

## 2020-07-24 PROCEDURE — 99214 PR OFFICE/OUTPT VISIT, EST, LEVL IV, 30-39 MIN: ICD-10-PCS | Mod: S$PBB,,, | Performed by: INTERNAL MEDICINE

## 2020-07-24 RX ORDER — LISINOPRIL AND HYDROCHLOROTHIAZIDE 10; 12.5 MG/1; MG/1
1 TABLET ORAL DAILY
Qty: 90 TABLET | Refills: 3 | Status: SHIPPED | OUTPATIENT
Start: 2020-07-24 | End: 2021-07-30 | Stop reason: SDUPTHER

## 2020-07-24 RX ORDER — CLINDAMYCIN HYDROCHLORIDE 300 MG/1
CAPSULE ORAL
COMMUNITY
Start: 2019-10-01 | End: 2020-07-24

## 2020-07-24 RX ORDER — PRAVASTATIN SODIUM 20 MG/1
20 TABLET ORAL DAILY
Qty: 90 TABLET | Refills: 3 | Status: SHIPPED | OUTPATIENT
Start: 2020-07-24 | End: 2021-07-30 | Stop reason: SDUPTHER

## 2020-07-24 RX ORDER — IBUPROFEN 600 MG/1
TABLET ORAL
Status: ON HOLD | COMMUNITY
Start: 2019-11-05 | End: 2023-03-21 | Stop reason: HOSPADM

## 2020-07-24 RX ORDER — TAMSULOSIN HYDROCHLORIDE 0.4 MG/1
0.4 CAPSULE ORAL DAILY
Qty: 90 CAPSULE | Refills: 3 | Status: SHIPPED | OUTPATIENT
Start: 2020-07-24 | End: 2020-07-24 | Stop reason: SDUPTHER

## 2020-07-24 RX ORDER — TAMSULOSIN HYDROCHLORIDE 0.4 MG/1
0.4 CAPSULE ORAL DAILY
Qty: 90 CAPSULE | Refills: 3 | Status: SHIPPED | OUTPATIENT
Start: 2020-07-24 | End: 2021-03-09

## 2020-07-24 NOTE — PROGRESS NOTES
Assessment & Plan  Problem List Items Addressed This Visit        Cardiac/Vascular    Hyperlipidemia (Chronic)    Current Assessment & Plan     The current medical regimen is effective;  continue present plan and medications.          Relevant Medications    pravastatin (PRAVACHOL) 20 MG tablet    Essential hypertension - Primary (Chronic)    Current Assessment & Plan     The current medical regimen is effective;  continue present plan and medications.          Relevant Medications    lisinopriL-hydrochlorothiazide (PRINZIDE,ZESTORETIC) 10-12.5 mg per tablet    Other Relevant Orders    CBC auto differential    Comprehensive metabolic panel    Lipid Panel       Renal/    Benign non-nodular prostatic hyperplasia with lower urinary tract symptoms (Chronic)    Current Assessment & Plan     The current medical regimen is effective;  continue present plan and medications.          Relevant Medications    tamsulosin (FLOMAX) 0.4 mg Cap      Other Visit Diagnoses     Encounter for FIT (fecal immunochemical test) screening    -  FitKit was given to patient on 7/24/2020 11:09 AM          Relevant Orders    Fecal Immunochemical Test (iFOBT)            Health Maintenance reviewed, as above.    Follow-up: Follow up in about 1 year (around 7/24/2021) for Routine Physical.    ______________________________________________________________________    Chief Complaint  Chief Complaint   Patient presents with    Hypertension     follow up    Hyperlipidemia    Follow-up       HPI  Johnathan Waters is a 75 y.o. male with multiple medical diagnoses as listed in the medical history and problem list that presents for HTN HLD follow up.  Pt is known to me with his last appointment 10/2019.  He had labs prior to this OV that showed reassuring CMP lipids.  HDL down.  Slight change in H/H.    He reports having hives after taking cephalexin (see mychart notes).  No return of symptoms.      Taking and tolerating medications as prescribed  without perceived side effects.  No CP, SOB, palpitations, hypoglycemic symptoms.            PAST MEDICAL HISTORY:  Past Medical History:   Diagnosis Date    Elevated cholesterol     Hypertension     Squamous cell carcinoma     under right eye.  Sees outside Dermatology    Trigger ring finger of right hand 1/30/2019       PAST SURGICAL HISTORY:  Past Surgical History:   Procedure Laterality Date    EYE SURGERY      VASECTOMY         SOCIAL HISTORY:  Social History     Socioeconomic History    Marital status:      Spouse name: Not on file    Number of children: Not on file    Years of education: Not on file    Highest education level: Not on file   Occupational History    Not on file   Social Needs    Financial resource strain: Not hard at all    Food insecurity     Worry: Never true     Inability: Never true    Transportation needs     Medical: No     Non-medical: No   Tobacco Use    Smoking status: Never Smoker    Smokeless tobacco: Never Used   Substance and Sexual Activity    Alcohol use: No     Frequency: Monthly or less     Drinks per session: 1 or 2     Binge frequency: Never    Drug use: No    Sexual activity: Yes     Partners: Female     Comment:    Lifestyle    Physical activity     Days per week: 2 days     Minutes per session: 40 min    Stress: Not at all   Relationships    Social connections     Talks on phone: Once a week     Gets together: Patient refused     Attends [employer] service: Not on file     Active member of club or organization: Yes     Attends meetings of clubs or organizations: Patient refused     Relationship status:    Other Topics Concern    Not on file   Social History Narrative    Not on file       FAMILY HISTORY:  Family History   Problem Relation Age of Onset    Hypertension Mother     Cancer Father         lung    Diabetes Sister        ALLERGIES AND MEDICATIONS: updated and reviewed.  Review of patient's allergies indicates:  "  Allergen Reactions    Wasp venom Itching and Swelling    Sulfa (sulfonamide antibiotics) Other (See Comments)     Patient unsure, toddler     Current Outpatient Medications   Medication Sig Dispense Refill    ibuprofen (ADVIL,MOTRIN) 600 MG tablet       lisinopriL-hydrochlorothiazide (PRINZIDE,ZESTORETIC) 10-12.5 mg per tablet Take 1 tablet by mouth once daily. 90 tablet 3    mometasone 0.1% (ELOCON) 0.1 % cream       pravastatin (PRAVACHOL) 20 MG tablet Take 1 tablet (20 mg total) by mouth once daily. 90 tablet 3    tamsulosin (FLOMAX) 0.4 mg Cap Take 1 capsule (0.4 mg total) by mouth once daily. 90 capsule 3     No current facility-administered medications for this visit.          ROS  Review of Systems   Constitutional: Negative for activity change and unexpected weight change.   HENT: Negative for hearing loss, rhinorrhea and trouble swallowing.    Eyes: Negative for discharge and visual disturbance.   Respiratory: Negative for chest tightness and wheezing.    Cardiovascular: Negative for chest pain and palpitations.   Gastrointestinal: Negative for blood in stool, constipation, diarrhea and vomiting.   Endocrine: Negative for polydipsia and polyuria.   Genitourinary: Negative for difficulty urinating, hematuria and urgency.   Musculoskeletal: Negative for arthralgias, joint swelling and neck pain.   Neurological: Negative for weakness and headaches.   Psychiatric/Behavioral: Negative for confusion and dysphoric mood.           Physical Exam  Vitals:    07/24/20 1022 07/24/20 1109   BP: 136/74 126/78   Pulse: (!) 55    Temp: 97.9 °F (36.6 °C)    TempSrc: Temporal    SpO2: 99%    Weight: 77.7 kg (171 lb 3 oz)    Height: 5' 7" (1.702 m)     Body mass index is 26.81 kg/m².  Weight: 77.7 kg (171 lb 3 oz)   Height: 5' 7" (170.2 cm)   Physical Exam  Constitutional:       General: He is not in acute distress.     Appearance: He is well-developed.   HENT:      Head: Normocephalic and atraumatic.   Eyes:      " General: Lids are normal. No scleral icterus.     Conjunctiva/sclera: Conjunctivae normal.      Pupils: Pupils are equal, round, and reactive to light.   Neck:      Musculoskeletal: Full passive range of motion without pain and neck supple.      Thyroid: No thyromegaly.      Vascular: No carotid bruit or JVD.   Cardiovascular:      Rate and Rhythm: Normal rate and regular rhythm.      Heart sounds: Normal heart sounds. No murmur. No friction rub. No S3 or S4 sounds.    Pulmonary:      Effort: Pulmonary effort is normal.      Breath sounds: Normal breath sounds. No wheezing, rhonchi or rales.   Abdominal:      General: Bowel sounds are normal. There is no distension.      Palpations: Abdomen is soft.      Tenderness: There is no abdominal tenderness.   Musculoskeletal:         General: No tenderness.      Right lower leg: No edema.      Left lower leg: No edema.   Skin:     General: Skin is warm and dry.      Findings: No rash.   Neurological:      Mental Status: He is alert and oriented to person, place, and time.   Psychiatric:         Speech: Speech normal.         Behavior: Behavior normal.         Thought Content: Thought content normal.           Health Maintenance       Date Due Completion Date    Colorectal Cancer Screening 06/28/2020 6/28/2019    Override on 6/1/2017: Declined (will proceed with stool cards instead)    Override on 3/3/2006: Done (normal.  In [facility] documents)    Influenza Vaccine (1) 09/01/2020 10/1/2019    TETANUS VACCINE 12/26/2024 12/26/2014    Lipid Panel 07/20/2025 7/20/2020

## 2020-08-31 PROCEDURE — 99454 REM MNTR PHYSIOL PARAM 16-30: CPT | Mod: PBBFAC,PO | Performed by: INTERNAL MEDICINE

## 2020-09-01 ENCOUNTER — LAB VISIT (OUTPATIENT)
Dept: LAB | Facility: HOSPITAL | Age: 75
End: 2020-09-01
Attending: INTERNAL MEDICINE
Payer: MEDICARE

## 2020-09-01 DIAGNOSIS — Z12.11 ENCOUNTER FOR FIT (FECAL IMMUNOCHEMICAL TEST) SCREENING: ICD-10-CM

## 2020-09-01 PROCEDURE — 82274 ASSAY TEST FOR BLOOD FECAL: CPT

## 2020-09-09 ENCOUNTER — PATIENT MESSAGE (OUTPATIENT)
Dept: FAMILY MEDICINE | Facility: CLINIC | Age: 75
End: 2020-09-09

## 2020-09-09 DIAGNOSIS — R19.5 POSITIVE FIT (FECAL IMMUNOCHEMICAL TEST): Primary | ICD-10-CM

## 2020-09-09 LAB — HEMOCCULT STL QL IA: POSITIVE

## 2020-09-16 DIAGNOSIS — Z12.11 COLON CANCER SCREENING: ICD-10-CM

## 2020-09-17 ENCOUNTER — PATIENT OUTREACH (OUTPATIENT)
Dept: OTHER | Facility: OTHER | Age: 75
End: 2020-09-17

## 2020-09-18 ENCOUNTER — PATIENT MESSAGE (OUTPATIENT)
Dept: FAMILY MEDICINE | Facility: CLINIC | Age: 75
End: 2020-09-18

## 2020-09-18 DIAGNOSIS — Z12.11 COLON CANCER SCREENING: Primary | ICD-10-CM

## 2020-09-18 RX ORDER — SODIUM, POTASSIUM,MAG SULFATES 17.5-3.13G
1 SOLUTION, RECONSTITUTED, ORAL ORAL DAILY
Qty: 1 KIT | Refills: 0 | Status: SHIPPED | OUTPATIENT
Start: 2020-10-06 | End: 2020-10-08

## 2020-09-19 ENCOUNTER — PATIENT MESSAGE (OUTPATIENT)
Dept: FAMILY MEDICINE | Facility: CLINIC | Age: 75
End: 2020-09-19

## 2020-09-19 ENCOUNTER — CLINICAL SUPPORT (OUTPATIENT)
Dept: URGENT CARE | Facility: CLINIC | Age: 75
End: 2020-09-19
Payer: MEDICARE

## 2020-09-19 DIAGNOSIS — Z12.11 COLON CANCER SCREENING: ICD-10-CM

## 2020-09-19 PROCEDURE — 99211 PR OFFICE/OUTPT VISIT, EST, LEVL I: ICD-10-PCS | Mod: S$GLB,,, | Performed by: PHYSICIAN ASSISTANT

## 2020-09-19 PROCEDURE — U0003 INFECTIOUS AGENT DETECTION BY NUCLEIC ACID (DNA OR RNA); SEVERE ACUTE RESPIRATORY SYNDROME CORONAVIRUS 2 (SARS-COV-2) (CORONAVIRUS DISEASE [COVID-19]), AMPLIFIED PROBE TECHNIQUE, MAKING USE OF HIGH THROUGHPUT TECHNOLOGIES AS DESCRIBED BY CMS-2020-01-R: HCPCS

## 2020-09-19 PROCEDURE — 99211 OFF/OP EST MAY X REQ PHY/QHP: CPT | Mod: S$GLB,,, | Performed by: PHYSICIAN ASSISTANT

## 2020-09-20 LAB — SARS-COV-2 RNA RESP QL NAA+PROBE: NOT DETECTED

## 2020-09-21 ENCOUNTER — ANESTHESIA EVENT (OUTPATIENT)
Dept: ENDOSCOPY | Facility: HOSPITAL | Age: 75
End: 2020-09-21
Payer: MEDICARE

## 2020-09-21 RX ORDER — LIDOCAINE HYDROCHLORIDE 10 MG/ML
1 INJECTION, SOLUTION EPIDURAL; INFILTRATION; INTRACAUDAL; PERINEURAL ONCE
Status: CANCELLED | OUTPATIENT
Start: 2020-09-21 | End: 2020-09-21

## 2020-09-21 RX ORDER — SODIUM CHLORIDE 9 MG/ML
INJECTION, SOLUTION INTRAVENOUS CONTINUOUS
Status: CANCELLED | OUTPATIENT
Start: 2020-09-21

## 2020-09-22 ENCOUNTER — ANESTHESIA (OUTPATIENT)
Dept: ENDOSCOPY | Facility: HOSPITAL | Age: 75
End: 2020-09-22
Payer: MEDICARE

## 2020-09-22 ENCOUNTER — HOSPITAL ENCOUNTER (OUTPATIENT)
Facility: HOSPITAL | Age: 75
Discharge: HOME | End: 2020-09-22
Attending: INTERNAL MEDICINE | Admitting: INTERNAL MEDICINE
Payer: MEDICARE

## 2020-09-22 VITALS
OXYGEN SATURATION: 98 % | SYSTOLIC BLOOD PRESSURE: 107 MMHG | HEART RATE: 61 BPM | RESPIRATION RATE: 18 BRPM | DIASTOLIC BLOOD PRESSURE: 57 MMHG | TEMPERATURE: 99 F

## 2020-09-22 PROCEDURE — 63600175 PHARM REV CODE 636 W HCPCS: Performed by: REGISTERED NURSE

## 2020-09-22 PROCEDURE — D9220A PRA ANESTHESIA: Mod: CRNA,,, | Performed by: REGISTERED NURSE

## 2020-09-22 PROCEDURE — G0121 COLON CA SCRN NOT HI RSK IND: HCPCS | Performed by: INTERNAL MEDICINE

## 2020-09-22 PROCEDURE — 37000008 HC ANESTHESIA 1ST 15 MINUTES: Performed by: INTERNAL MEDICINE

## 2020-09-22 PROCEDURE — D9220A PRA ANESTHESIA: Mod: ANES,,, | Performed by: ANESTHESIOLOGY

## 2020-09-22 PROCEDURE — G0121 COLON CA SCRN NOT HI RSK IND: HCPCS | Mod: ,,, | Performed by: INTERNAL MEDICINE

## 2020-09-22 PROCEDURE — 25000003 PHARM REV CODE 250: Performed by: REGISTERED NURSE

## 2020-09-22 PROCEDURE — G0121 COLON CA SCRN NOT HI RSK IND: ICD-10-PCS | Mod: ,,, | Performed by: INTERNAL MEDICINE

## 2020-09-22 PROCEDURE — D9220A PRA ANESTHESIA: ICD-10-PCS | Mod: ANES,,, | Performed by: ANESTHESIOLOGY

## 2020-09-22 PROCEDURE — 37000009 HC ANESTHESIA EA ADD 15 MINS: Performed by: INTERNAL MEDICINE

## 2020-09-22 PROCEDURE — D9220A PRA ANESTHESIA: ICD-10-PCS | Mod: CRNA,,, | Performed by: REGISTERED NURSE

## 2020-09-22 RX ORDER — LIDOCAINE HYDROCHLORIDE 20 MG/ML
INJECTION INTRAVENOUS
Status: DISCONTINUED | OUTPATIENT
Start: 2020-09-22 | End: 2020-09-22

## 2020-09-22 RX ORDER — SODIUM CHLORIDE 9 MG/ML
INJECTION, SOLUTION INTRAVENOUS CONTINUOUS PRN
Status: DISCONTINUED | OUTPATIENT
Start: 2020-09-22 | End: 2020-09-22

## 2020-09-22 RX ORDER — PROPOFOL 10 MG/ML
VIAL (ML) INTRAVENOUS
Status: DISCONTINUED | OUTPATIENT
Start: 2020-09-22 | End: 2020-09-22

## 2020-09-22 RX ORDER — LIDOCAINE HYDROCHLORIDE 20 MG/ML
INJECTION, SOLUTION EPIDURAL; INFILTRATION; INTRACAUDAL; PERINEURAL
Status: DISCONTINUED
Start: 2020-09-22 | End: 2020-09-22 | Stop reason: WASHOUT

## 2020-09-22 RX ORDER — LIDOCAINE HYDROCHLORIDE 20 MG/ML
INJECTION, SOLUTION EPIDURAL; INFILTRATION; INTRACAUDAL; PERINEURAL
Status: DISCONTINUED
Start: 2020-09-22 | End: 2020-09-22 | Stop reason: HOSPADM

## 2020-09-22 RX ORDER — PROPOFOL 10 MG/ML
INJECTION, EMULSION INTRAVENOUS
Status: DISCONTINUED
Start: 2020-09-22 | End: 2020-09-22 | Stop reason: HOSPADM

## 2020-09-22 RX ADMIN — PROPOFOL 50 MG: 10 INJECTION, EMULSION INTRAVENOUS at 10:09

## 2020-09-22 RX ADMIN — PROPOFOL 70 MG: 10 INJECTION, EMULSION INTRAVENOUS at 09:09

## 2020-09-22 RX ADMIN — Medication 100 MG: at 09:09

## 2020-09-22 RX ADMIN — SODIUM CHLORIDE: 9 INJECTION, SOLUTION INTRAVENOUS at 09:09

## 2020-09-22 NOTE — ANESTHESIA PREPROCEDURE EVALUATION
09/22/2020  Johnathan Waters is a 75 y.o., male.    Anesthesia Evaluation     I have reviewed the Nursing Notes.       Review of Systems  Anesthesia Hx:  No problems with previous Anesthesia   Social:  Non-Smoker    Cardiovascular:   Exercise tolerance: good Denies Pacemaker. Hypertension  Denies Valvular problems/Murmurs.  Denies MI.  Denies CAD.    Denies CABG/stent.  Denies Dysrhythmias.   Denies Angina.             denies PVD hyperlipidemia    Pulmonary:  Pulmonary Normal    Renal/:  Renal/ Normal     Hepatic/GI:   Bowel Prep. Denies PUD. Denies Hiatal Hernia.  Denies GERD. Denies Liver Disease.  Denies Hepatitis.    Neurological:  Neurology Normal    Endocrine:  Endocrine Normal        Physical Exam  General:  Well nourished    Airway/Jaw/Neck:  AIRWAY FINDINGS: Normal      Chest/Lungs:  Chest/Lungs Clear    Heart/Vascular:  Heart Findings: Normal       Mental Status:  Mental Status Findings: Normal        Anesthesia Plan  Type of Anesthesia, risks & benefits discussed:  Anesthesia Type:  general  Patient's Preference:   Intra-op Monitoring Plan: standard ASA monitors  Intra-op Monitoring Plan Comments:   Post Op Pain Control Plan:   Post Op Pain Control Plan Comments:   Induction:   IV  Beta Blocker:  Patient is not currently on a Beta-Blocker (No further documentation required).       Informed Consent: Patient understands risks and agrees with Anesthesia plan.  Questions answered. Anesthesia consent signed with patient.  ASA Score: 2     Day of Surgery Review of History & Physical:  There are no significant changes.  H&P update referred to the provider.         Ready For Surgery From Anesthesia Perspective.

## 2020-09-22 NOTE — H&P
"Office Visit    7/24/2020  [facility]     Bernard Davalos MD  Internal Medicine  Essential hypertension +3 more  Dx  Hypertension   , Hyperlipidemia , Follow-up  Reason for Visit   Additional Documentation    Vitals:    /78   Pulse 55    Temp 97.9 °F (36.6 °C) (Temporal)   Ht 5' 7" (1.702 m)   Wt 77.7 kg (171 lb 3 oz)   SpO2 99%   BMI 26.81 kg/m²   BSA 1.92 m²   Pain Sc 0-No pain      More Vitals   Flowsheets:    Anthropometrics,   PHQ-4 Depression Screen      SmartForms:     [facility] AMB - FALL RISK      Encounter Info:    Billing Info,   History,   Allergies,   Detailed Report      Instructions       Follow up in about 1 year (around 7/24/2021) for Routine Physical.   After Visit Summary (Automatic SnapShot taken 7/24/2020)  Progress Notes  Bernard Davalos MD (Physician)   Internal Medicine   7/24/2020 10:20 AM   Signed  Expand All Collapse All    Assessment & Plan       Problem List Items Addressed This Visit                 Cardiac/Vascular      Hyperlipidemia (Chronic)      Current Assessment & Plan        The current medical regimen is effective;  continue present plan and medications.             Relevant Medications      pravastatin (PRAVACHOL) 20 MG tablet      Essential hypertension - Primary (Chronic)      Current Assessment & Plan        The current medical regimen is effective;  continue present plan and medications.             Relevant Medications      lisinopriL-hydrochlorothiazide (PRINZIDE,ZESTORETIC) 10-12.5 mg per tablet      Other Relevant Orders      CBC auto differential      Comprehensive metabolic panel      Lipid Panel            Renal/      Benign non-nodular prostatic hyperplasia with lower urinary tract symptoms (Chronic)      Current Assessment & Plan        The current medical regimen is effective;  continue present plan and medications.             Relevant Medications      tamsulosin (FLOMAX) 0.4 mg Cap                Other Visit Diagnoses      Encounter " for FIT (fecal immunochemical test) screening    -  FitKit was given to patient on 7/24/2020 11:09 AM            Relevant Orders     Fecal Immunochemical Test (iFOBT)                Health Maintenance reviewed, as above.     Follow-up: Follow up in about 1 year (around 7/24/2021) for Routine Physical.     ______________________________________________________________________     Chief Complaint       Chief Complaint   Patient presents with    Hypertension       follow up    Hyperlipidemia    Follow-up         HPI  Johnathan Waters is a 75 y.o. male with multiple medical diagnoses as listed in the medical history and problem list that presents for HTN HLD follow up.  Pt is known to me with his last appointment 10/2019.  He had labs prior to this OV that showed reassuring CMP lipids.  HDL down.  Slight change in H/H.     He reports having hives after taking cephalexin (see mychart notes).  No return of symptoms.       Taking and tolerating medications as prescribed without perceived side effects.  No CP, SOB, palpitations, hypoglycemic symptoms.                PAST MEDICAL HISTORY:       Past Medical History:   Diagnosis Date    Elevated cholesterol      Hypertension      Squamous cell carcinoma       under right eye.  Sees outside Dermatology    Trigger ring finger of right hand 1/30/2019         PAST SURGICAL HISTORY:        Past Surgical History:   Procedure Laterality Date    EYE SURGERY        VASECTOMY             SOCIAL HISTORY:  Social History               Socioeconomic History    Marital status:        Spouse name: Not on file    Number of children: Not on file    Years of education: Not on file    Highest education level: Not on file   Occupational History    Not on file   Social Needs    Financial resource strain: Not hard at all    Food insecurity       Worry: Never true       Inability: Never true    Transportation needs       Medical: No       Non-medical: No   Tobacco Use     Smoking status: Never Smoker    Smokeless tobacco: Never Used   Substance and Sexual Activity    Alcohol use: No       Frequency: Monthly or less       Drinks per session: 1 or 2       Binge frequency: Never    Drug use: No    Sexual activity: Yes       Partners: Female       Comment:    Lifestyle    Physical activity       Days per week: 2 days       Minutes per session: 40 min    Stress: Not at all   Relationships    Social connections       Talks on phone: Once a week       Gets together: Patient refused       Attends Religion service: Not on file       Active member of club or organization: Yes       Attends meetings of clubs or organizations: Patient refused       Relationship status:    Other Topics Concern    Not on file   Social History Narrative    Not on file           FAMILY HISTORY:        Family History   Problem Relation Age of Onset    Hypertension Mother      Cancer Father           lung    Diabetes Sister           ALLERGIES AND MEDICATIONS: updated and reviewed.        Review of patient's allergies indicates:   Allergen Reactions    Wasp venom Itching and Swelling    Sulfa (sulfonamide antibiotics) Other (See Comments)       Patient unsure, toddler      Current Medications          Current Outpatient Medications   Medication Sig Dispense Refill    ibuprofen (ADVIL,MOTRIN) 600 MG tablet          lisinopriL-hydrochlorothiazide (PRINZIDE,ZESTORETIC) 10-12.5 mg per tablet Take 1 tablet by mouth once daily. 90 tablet 3    mometasone 0.1% (ELOCON) 0.1 % cream          pravastatin (PRAVACHOL) 20 MG tablet Take 1 tablet (20 mg total) by mouth once daily. 90 tablet 3    tamsulosin (FLOMAX) 0.4 mg Cap Take 1 capsule (0.4 mg total) by mouth once daily. 90 capsule 3      No current facility-administered medications for this visit.               ROS  Review of Systems   Constitutional: Negative for activity change and unexpected weight change.   HENT: Negative for hearing loss,  "rhinorrhea and trouble swallowing.    Eyes: Negative for discharge and visual disturbance.   Respiratory: Negative for chest tightness and wheezing.    Cardiovascular: Negative for chest pain and palpitations.   Gastrointestinal: Negative for blood in stool, constipation, diarrhea and vomiting.   Endocrine: Negative for polydipsia and polyuria.   Genitourinary: Negative for difficulty urinating, hematuria and urgency.   Musculoskeletal: Negative for arthralgias, joint swelling and neck pain.   Neurological: Negative for weakness and headaches.   Psychiatric/Behavioral: Negative for confusion and dysphoric mood.               Physical Exam  Vitals        Vitals:     07/24/20 1022 07/24/20 1109   BP: 136/74 126/78   Pulse: (!) 55     Temp: 97.9 °F (36.6 °C)     TempSrc: Temporal     SpO2: 99%     Weight: 77.7 kg (171 lb 3 oz)     Height: 5' 7" (1.702 m)         Body mass index is 26.81 kg/m².  Weight: 77.7 kg (171 lb 3 oz)   Height: 5' 7" (170.2 cm)   Physical Exam  Constitutional:       General: He is not in acute distress.     Appearance: He is well-developed.   HENT:      Head: Normocephalic and atraumatic.   Eyes:      General: Lids are normal. No scleral icterus.     Conjunctiva/sclera: Conjunctivae normal.      Pupils: Pupils are equal, round, and reactive to light.   Neck:      Musculoskeletal: Full passive range of motion without pain and neck supple.      Thyroid: No thyromegaly.      Vascular: No carotid bruit or JVD.   Cardiovascular:      Rate and Rhythm: Normal rate and regular rhythm.      Heart sounds: Normal heart sounds. No murmur. No friction rub. No S3 or S4 sounds.    Pulmonary:      Effort: Pulmonary effort is normal.      Breath sounds: Normal breath sounds. No wheezing, rhonchi or rales.   Abdominal:      General: Bowel sounds are normal. There is no distension.      Palpations: Abdomen is soft.      Tenderness: There is no abdominal tenderness.   Musculoskeletal:         General: No tenderness. "      Right lower leg: No edema.      Left lower leg: No edema.   Skin:     General: Skin is warm and dry.      Findings: No rash.   Neurological:      Mental Status: He is alert and oriented to person, place, and time.   Psychiatric:         Speech: Speech normal.         Behavior: Behavior normal.         Thought Content: Thought content normal.                      Health Maintenance        Date Due Completion Date     Colorectal Cancer Screening 06/28/2020 6/28/2019     Override on 6/1/2017: Declined (will proceed with stool cards instead)     Override on 3/3/2006: Done (normal.  In [facility] documents)     Influenza Vaccine (1) 09/01/2020 10/1/2019     TETANUS VACCINE 12/26/2024 12/26/2014     Lipid Panel 07/20/2025 7/20/2020                             Active Diagnosis Review (HCC)    Active Diagnosis Review (HCC)   Not recorded   All Charges for This Encounter    Code Description Service Date Service Provider Modifiers Qty   07499 [place] OFFICE/OUTPT VISIT,EST,LEVL IV 7/24/2020 Bernard Davalos MD S$PBB 1   [id number] HC E&M-EST. PATIENT - LVL III 7/24/2020 Bernard Davalos MD PBBFAC, PO 1   [id number] [place] PBB SHADOW E&M-EST. PATIENT-LVL III 7/24/2020 Bernard Davalos MD PBBFAC 1   Level of Service    Level of Service   [place] OFFICE/OUTPT VISIT,EST,LEVL IV [47062]   BestPractice Advisories    Click to view BestPractice Advisory history   AVS Reports    Date/Time Report Action User   7/24/2020 11:22 AM After Visit Summary Automatically Generated Criss Galvez LPN   7/24/2020 11:10 AM After Visit Summary Automatically Generated Bernard Davalos MD   Encounter-Level Documents - 07/24/2020:    After Visit Summary - Document on 7/24/2020 11:22 AM by Criss Galvez LPN: After Visit Summary  After Visit Summary - Document on 7/24/2020 11:10 AM by Bernard Davalos MD: After Visit Summary  Orders Placed       Fecal Immunochemical Test (iFOBT)     CBC auto differential     Comprehensive metabolic panel     Lipid  Panel  Medication Changes             Medication List   Fall Risk    Patient Mobility Status: Ambulatory   Number of falls in the past 12 months?: 0   Fall Risk?: No  Visit Diagnoses       Essential hypertension     Encounter for FIT (fecal immunochemical test) screening     Benign non-nodular prostatic hyperplasia with lower urinary tract symptoms     Hyperlipidemia, unspecified hyperlipidemia type     Problem List     Patient seen and examined. Agree with above. Will proceed with colonoscopy.

## 2020-09-22 NOTE — TRANSFER OF CARE
Anesthesia Transfer of Care Note    Patient: Johnathan Waters    Procedure(s) Performed: Procedure(s) (LRB):  COLONOSCOPY (N/A)    Patient location: GI    Anesthesia Type: general    Transport from OR: Transported from OR on room air with adequate spontaneous ventilation    Post pain: adequate analgesia    Post assessment: no apparent anesthetic complications and tolerated procedure well    Post vital signs: stable    Level of consciousness: awake and alert    Nausea/Vomiting: no nausea/vomiting    Complications: none    Transfer of care protocol was followed      Last vitals:   Visit Vitals  BP (!) 148/62 (BP Location: Right arm, Patient Position: Lying)   Pulse (!) 54   Temp 36.9 °C (98.5 °F) (Oral)   Resp 14   SpO2 100%

## 2020-09-22 NOTE — [employer] PATIENT INSTRUCTIONS
Discharge Summary/Instructions after an Endoscopic Procedure  Patient Name: Johnathan Waters  Patient MRN: [id number]  Patient YOB: 1945 Tuesday, September 22, 2020  Manuela Ward MD  RESTRICTIONS:  During your procedure today, you received medications for sedation.  These   medications may affect your judgment, balance and coordination.  Therefore,   for 24 hours, you have the following restrictions:   - DO NOT drive a car, operate machinery, make legal/financial decisions,   sign important papers or drink alcohol.    ACTIVITY:  Today: no heavy lifting, straining or running due to procedural   sedation/anesthesia.  The following day: return to full activity including work.  DIET:  Eat and drink normally unless instructed otherwise.     TREATMENT FOR COMMON SIDE EFFECTS:  - Mild abdominal pain, nausea, belching, bloating or excessive gas:  rest,   eat lightly and use a heating pad.  - Sore Throat: treat with throat lozenges and/or gargle with warm salt   water.  - Because air was used during the procedure, expelling large amounts of air   from your rectum or belching is normal.  - If a bowel prep was taken, you may not have a bowel movement for 1-3 days.    This is normal.  SYMPTOMS TO WATCH FOR AND REPORT TO YOUR PHYSICIAN:  1. Abdominal pain or bloating, other than gas cramps.  2. Chest pain.  3. Back pain.  4. Signs of infection such as: chills or fever occurring within 24 hours   after the procedure.  5. Rectal bleeding, which would show as bright red, maroon, or black stools.   (A tablespoon of blood from the rectum is not serious, especially if   hemorrhoids are present.)  6. Vomiting.  7. Weakness or dizziness.  GO DIRECTLY TO THE NEAREST EMERGENCY ROOM IF YOU HAVE ANY OF THE FOLLOWING:      Difficulty breathing              Chills and/or fever over 101 F   Persistent vomiting and/or vomiting blood   Severe abdominal pain   Severe chest pain   Black, tarry stools   Bleeding- more than one  tablespoon   Any other symptom or condition that you feel may need urgent attention  Your doctor recommends these additional instructions:  If any biopsies were taken, your doctors clinic will contact you in 1 to 2   weeks with any results.  - Discharge patient to home (ambulatory).   - Resume previous diet indefinitely.   - Continue present medications.   - Repeat colonoscopy in 10 years for screening purposes.   - Return to GI office PRN.  For questions, problems or results please call your physician - Manuela Ward MD at Work:  [phone].  [facility] can be reached at [phone]     IF A COMPLICATION OR EMERGENCY SITUATION ARISES AND YOU ARE UNABLE TO REACH   YOUR PHYSICIAN - GO DIRECTLY TO THE EMERGENCY ROOM.  Manuela Ward MD  9/22/2020 10:16:53 AM  This report has been verified and signed electronically.  [employer]

## 2020-09-28 NOTE — ANESTHESIA POSTPROCEDURE EVALUATION
Anesthesia Post Evaluation    Patient: Johnathan Waters    Procedure(s) Performed: Procedure(s) (LRB):  COLONOSCOPY (N/A)    Final Anesthesia Type: general    Patient location during evaluation: GI PACU  Patient participation: Yes- Able to Participate  Level of consciousness: awake and alert  Post-procedure vital signs: reviewed and stable  Pain management: adequate  Airway patency: patent    PONV status at discharge: No PONV  Anesthetic complications: no      Cardiovascular status: blood pressure returned to baseline and hemodynamically stable  Respiratory status: unassisted and spontaneous ventilation  Hydration status: euvolemic  Follow-up not needed.          Vitals Value Taken Time   /57 09/22/20 1030   Temp 36.9 °C (98.5 °F) 09/22/20 1017   Pulse 61 09/22/20 1030   Resp 18 09/22/20 1030   SpO2 98 % 09/22/20 1030         Event Time   Out of Recovery 10:45:48         Pain/Vicky Score: No data recorded

## 2020-10-14 NOTE — PROGRESS NOTES
Digital Medicine: Health  Follow-Up    The history is provided by the patient.             Reason for review: Blood pressure at goal        Topics Covered on Call: physical activity and Diet    Additional Follow-up details: Received the following update via [employer] from patient:    Doing spinning classes for exercise.   I was not placed on or suggested to follow a low Na diet. But I do follow a sensible diet high in vegetable/ fruits and low in red meats.    Thanked patient for his update.                    Diet-no change to diet    No change to diet.        Physical Activity-Change      He added spin class to His physical activity routine.        Additional physical activity details: Patient reports taking spin class for exercise      Medication Adherence-Medication Adherence not addressed.      Substance, Sleep, Stress-Not assessed      Continue current diet/physical activity routine.       Addressed patient questions and patient has my contact information if needed prior to next outreach.   Explained the importance of self-monitoring and medication adherence. Encouraged the patient to communicate with their health  for lifestyle modifications to help improve or maintain a healthy lifestyle.               There are no preventive care reminders to display for this patient.      Last 5 Patient Entered Readings                                      Current 30 Day Average: 112/64     Recent Readings 10/13/2020 10/12/2020 10/11/2020 10/8/2020 10/8/2020    SBP (mmHg) 104 119 107 115 132    DBP (mmHg) 54 68 61 63 62    Pulse 55 49 51 50 49

## 2020-10-28 ENCOUNTER — PATIENT OUTREACH (OUTPATIENT)
Dept: OTHER | Facility: OTHER | Age: 75
End: 2020-10-28

## 2020-10-28 DIAGNOSIS — I10 ESSENTIAL HYPERTENSION: Primary | Chronic | ICD-10-CM

## 2020-11-25 NOTE — PROGRESS NOTES
Digital Medicine: Clinician Follow-Up    Called patient for follow up and to schedule BMP. He is doing well with no complaints. He confirms taking lisinopril/ HCTZ    The history is provided by the patient.   Follow-up reason(s): routine follow up.     Hypertension    Patient's blood pressure is stable.         Last 5 Patient Entered Readings                                      Current 30 Day Average: 122/68     Recent Readings 11/23/2020 11/22/2020 11/22/2020 11/22/2020 11/22/2020    SBP (mmHg) 116 123 140 131 136    DBP (mmHg) 70 67 73 71 71    Pulse 51 54 45 45 46                 Depression Screening  Johnathan Waters screened negative on the depression screening.     Sleep Apnea Screening  Patient not previously diagnosed with DAVID       Medication Affordability Screening  Patient screened negative on the medication affordability questionnaires. Patient is currently not having problems affording medications    Medication Adherence-Medication adherence was asssessed.  Patient continue taking medication as prescribed.            ASSESSMENT(S)  Patients BP average is 122/68 mmHg, which is at goal. Patient's BP goal is less than or equal to 130/80.    Hypertension Plan  Labs ordered. BMP Expected Lab Date: 1/4/2021  Continue current therapy.       Addressed patient questions and patient has my contact information if needed prior to next outreach. Patient verbalizes understanding.             There are no preventive care reminders to display for this patient.  There are no preventive care reminders to display for this patient.      Hypertension Medications     lisinopriL-hydrochlorothiazide (PRINZIDE,ZESTORETIC) 10-12.5 mg per tablet Take 1 tablet by mouth once daily.

## 2021-01-04 ENCOUNTER — LAB VISIT (OUTPATIENT)
Dept: LAB | Facility: HOSPITAL | Age: 76
End: 2021-01-04
Attending: INTERNAL MEDICINE
Payer: MEDICARE

## 2021-01-04 DIAGNOSIS — I10 ESSENTIAL HYPERTENSION: Chronic | ICD-10-CM

## 2021-01-04 PROCEDURE — 36415 COLL VENOUS BLD VENIPUNCTURE: CPT | Mod: PO

## 2021-01-04 PROCEDURE — 80048 BASIC METABOLIC PNL TOTAL CA: CPT

## 2021-01-05 LAB
ANION GAP SERPL CALC-SCNC: 12 MMOL/L (ref 8–16)
BUN SERPL-MCNC: 16 MG/DL (ref 8–23)
CALCIUM SERPL-MCNC: 9.2 MG/DL (ref 8.7–10.5)
CHLORIDE SERPL-SCNC: 102 MMOL/L (ref 95–110)
CO2 SERPL-SCNC: 27 MMOL/L (ref 23–29)
CREAT SERPL-MCNC: 1 MG/DL (ref 0.5–1.4)
EST. GFR  (AFRICAN AMERICAN): >60 ML/MIN/1.73 M^2
EST. GFR  (NON AFRICAN AMERICAN): >60 ML/MIN/1.73 M^2
GLUCOSE SERPL-MCNC: 131 MG/DL (ref 70–110)
POTASSIUM SERPL-SCNC: 3.6 MMOL/L (ref 3.5–5.1)
SODIUM SERPL-SCNC: 141 MMOL/L (ref 136–145)

## 2021-01-12 ENCOUNTER — IMMUNIZATION (OUTPATIENT)
Dept: PHARMACY | Facility: CLINIC | Age: 76
End: 2021-01-12

## 2021-01-12 DIAGNOSIS — Z23 NEED FOR VACCINATION: ICD-10-CM

## 2021-02-09 ENCOUNTER — IMMUNIZATION (OUTPATIENT)
Dept: PHARMACY | Facility: CLINIC | Age: 76
End: 2021-02-09

## 2021-02-09 DIAGNOSIS — Z23 NEED FOR VACCINATION: Primary | ICD-10-CM

## 2021-03-09 DIAGNOSIS — N40.1 BENIGN NON-NODULAR PROSTATIC HYPERPLASIA WITH LOWER URINARY TRACT SYMPTOMS: Chronic | ICD-10-CM

## 2021-03-09 RX ORDER — TAMSULOSIN HYDROCHLORIDE 0.4 MG/1
CAPSULE ORAL
Qty: 90 CAPSULE | Refills: 1 | Status: SHIPPED | OUTPATIENT
Start: 2021-03-09 | End: 2021-07-30 | Stop reason: SDUPTHER

## 2021-04-08 ENCOUNTER — PATIENT MESSAGE (OUTPATIENT)
Dept: ADMINISTRATIVE | Facility: OTHER | Age: 76
End: 2021-04-08

## 2021-05-03 ENCOUNTER — PATIENT MESSAGE (OUTPATIENT)
Dept: ADMINISTRATIVE | Facility: OTHER | Age: 76
End: 2021-05-03

## 2021-05-28 DIAGNOSIS — M79.642 PAIN IN BOTH HANDS: Primary | ICD-10-CM

## 2021-05-28 DIAGNOSIS — M79.641 PAIN IN BOTH HANDS: Primary | ICD-10-CM

## 2021-05-31 ENCOUNTER — OFFICE VISIT (OUTPATIENT)
Dept: ORTHOPEDICS | Facility: CLINIC | Age: 76
End: 2021-05-31
Payer: MEDICARE

## 2021-05-31 VITALS
WEIGHT: 172.63 LBS | SYSTOLIC BLOOD PRESSURE: 132 MMHG | BODY MASS INDEX: 27.09 KG/M2 | HEART RATE: 53 BPM | OXYGEN SATURATION: 98 % | DIASTOLIC BLOOD PRESSURE: 64 MMHG | HEIGHT: 67 IN | RESPIRATION RATE: 18 BRPM

## 2021-05-31 DIAGNOSIS — M65.341 TRIGGER RING FINGER OF RIGHT HAND: Primary | ICD-10-CM

## 2021-05-31 PROCEDURE — 99999 PR PBB SHADOW E&M-EST. PATIENT-LVL III: ICD-10-PCS | Mod: PBBFAC,,, | Performed by: ORTHOPAEDIC SURGERY

## 2021-05-31 PROCEDURE — 99213 OFFICE O/P EST LOW 20 MIN: CPT | Mod: 25,S$PBB,, | Performed by: ORTHOPAEDIC SURGERY

## 2021-05-31 PROCEDURE — 99999 PR PBB SHADOW E&M-EST. PATIENT-LVL III: CPT | Mod: PBBFAC,,, | Performed by: ORTHOPAEDIC SURGERY

## 2021-05-31 PROCEDURE — 99213 OFFICE O/P EST LOW 20 MIN: CPT | Mod: PBBFAC,25,PN | Performed by: ORTHOPAEDIC SURGERY

## 2021-05-31 PROCEDURE — 20550 TENDON SHEATH: ICD-10-PCS | Mod: S$PBB,LT,, | Performed by: ORTHOPAEDIC SURGERY

## 2021-05-31 PROCEDURE — 99213 PR OFFICE/OUTPT VISIT, EST, LEVL III, 20-29 MIN: ICD-10-PCS | Mod: 25,S$PBB,, | Performed by: ORTHOPAEDIC SURGERY

## 2021-05-31 PROCEDURE — 20550 NJX 1 TENDON SHEATH/LIGAMENT: CPT | Mod: PBBFAC,PN,LT | Performed by: ORTHOPAEDIC SURGERY

## 2021-05-31 RX ADMIN — TRIAMCINOLONE ACETONIDE 40 MG: 40 INJECTION, SUSPENSION INTRA-ARTICULAR; INTRAMUSCULAR at 10:05

## 2021-06-01 RX ORDER — TRIAMCINOLONE ACETONIDE 40 MG/ML
40 INJECTION, SUSPENSION INTRA-ARTICULAR; INTRAMUSCULAR
Status: DISCONTINUED | OUTPATIENT
Start: 2021-05-31 | End: 2021-06-01 | Stop reason: HOSPADM

## 2021-07-24 ENCOUNTER — LAB VISIT (OUTPATIENT)
Dept: LAB | Facility: HOSPITAL | Age: 76
End: 2021-07-24
Attending: INTERNAL MEDICINE
Payer: MEDICARE

## 2021-07-24 DIAGNOSIS — I10 ESSENTIAL HYPERTENSION: Chronic | ICD-10-CM

## 2021-07-24 LAB
ALBUMIN SERPL BCP-MCNC: 3.7 G/DL (ref 3.5–5.2)
ALP SERPL-CCNC: 58 U/L (ref 55–135)
ALT SERPL W/O P-5'-P-CCNC: 25 U/L (ref 10–44)
ANION GAP SERPL CALC-SCNC: 9 MMOL/L (ref 8–16)
AST SERPL-CCNC: 17 U/L (ref 10–40)
BASOPHILS # BLD AUTO: 0.07 K/UL (ref 0–0.2)
BASOPHILS NFR BLD: 0.9 % (ref 0–1.9)
BILIRUB SERPL-MCNC: 0.6 MG/DL (ref 0.1–1)
BUN SERPL-MCNC: 16 MG/DL (ref 8–23)
CALCIUM SERPL-MCNC: 9.7 MG/DL (ref 8.7–10.5)
CHLORIDE SERPL-SCNC: 104 MMOL/L (ref 95–110)
CHOLEST SERPL-MCNC: 172 MG/DL (ref 120–199)
CHOLEST/HDLC SERPL: 2.9 {RATIO} (ref 2–5)
CO2 SERPL-SCNC: 27 MMOL/L (ref 23–29)
CREAT SERPL-MCNC: 1.1 MG/DL (ref 0.5–1.4)
DIFFERENTIAL METHOD: ABNORMAL
EOSINOPHIL # BLD AUTO: 0.3 K/UL (ref 0–0.5)
EOSINOPHIL NFR BLD: 4.3 % (ref 0–8)
ERYTHROCYTE [DISTWIDTH] IN BLOOD BY AUTOMATED COUNT: 13.9 % (ref 11.5–14.5)
EST. GFR  (AFRICAN AMERICAN): >60 ML/MIN/1.73 M^2
EST. GFR  (NON AFRICAN AMERICAN): >60 ML/MIN/1.73 M^2
GLUCOSE SERPL-MCNC: 92 MG/DL (ref 70–110)
HCT VFR BLD AUTO: 45.3 % (ref 40–54)
HDLC SERPL-MCNC: 59 MG/DL (ref 40–75)
HDLC SERPL: 34.3 % (ref 20–50)
HGB BLD-MCNC: 14.4 G/DL (ref 14–18)
IMM GRANULOCYTES # BLD AUTO: 0.02 K/UL (ref 0–0.04)
IMM GRANULOCYTES NFR BLD AUTO: 0.3 % (ref 0–0.5)
LDLC SERPL CALC-MCNC: 99.4 MG/DL (ref 63–159)
LYMPHOCYTES # BLD AUTO: 1.7 K/UL (ref 1–4.8)
LYMPHOCYTES NFR BLD: 22 % (ref 18–48)
MCH RBC QN AUTO: 29.1 PG (ref 27–31)
MCHC RBC AUTO-ENTMCNC: 31.8 G/DL (ref 32–36)
MCV RBC AUTO: 92 FL (ref 82–98)
MONOCYTES # BLD AUTO: 0.9 K/UL (ref 0.3–1)
MONOCYTES NFR BLD: 11 % (ref 4–15)
NEUTROPHILS # BLD AUTO: 4.8 K/UL (ref 1.8–7.7)
NEUTROPHILS NFR BLD: 61.5 % (ref 38–73)
NONHDLC SERPL-MCNC: 113 MG/DL
NRBC BLD-RTO: 0 /100 WBC
PLATELET # BLD AUTO: 321 K/UL (ref 150–450)
PMV BLD AUTO: 11.7 FL (ref 9.2–12.9)
POTASSIUM SERPL-SCNC: 4 MMOL/L (ref 3.5–5.1)
PROT SERPL-MCNC: 6.8 G/DL (ref 6–8.4)
RBC # BLD AUTO: 4.94 M/UL (ref 4.6–6.2)
SODIUM SERPL-SCNC: 140 MMOL/L (ref 136–145)
TRIGL SERPL-MCNC: 68 MG/DL (ref 30–150)
WBC # BLD AUTO: 7.76 K/UL (ref 3.9–12.7)

## 2021-07-24 PROCEDURE — 36415 COLL VENOUS BLD VENIPUNCTURE: CPT | Mod: PO | Performed by: INTERNAL MEDICINE

## 2021-07-24 PROCEDURE — 80053 COMPREHEN METABOLIC PANEL: CPT | Performed by: INTERNAL MEDICINE

## 2021-07-24 PROCEDURE — 85025 COMPLETE CBC W/AUTO DIFF WBC: CPT | Performed by: INTERNAL MEDICINE

## 2021-07-24 PROCEDURE — 80061 LIPID PANEL: CPT | Performed by: INTERNAL MEDICINE

## 2021-07-30 ENCOUNTER — OFFICE VISIT (OUTPATIENT)
Dept: FAMILY MEDICINE | Facility: CLINIC | Age: 76
End: 2021-07-30
Payer: MEDICARE

## 2021-07-30 VITALS
HEIGHT: 67 IN | SYSTOLIC BLOOD PRESSURE: 138 MMHG | WEIGHT: 169 LBS | RESPIRATION RATE: 18 BRPM | DIASTOLIC BLOOD PRESSURE: 72 MMHG | OXYGEN SATURATION: 98 % | HEART RATE: 52 BPM | BODY MASS INDEX: 26.53 KG/M2 | TEMPERATURE: 98 F

## 2021-07-30 DIAGNOSIS — N40.1 BENIGN NON-NODULAR PROSTATIC HYPERPLASIA WITH LOWER URINARY TRACT SYMPTOMS: Chronic | ICD-10-CM

## 2021-07-30 DIAGNOSIS — E78.5 HYPERLIPIDEMIA, UNSPECIFIED HYPERLIPIDEMIA TYPE: ICD-10-CM

## 2021-07-30 DIAGNOSIS — I10 ESSENTIAL HYPERTENSION: Chronic | ICD-10-CM

## 2021-07-30 PROCEDURE — 99214 PR OFFICE/OUTPT VISIT, EST, LEVL IV, 30-39 MIN: ICD-10-PCS | Mod: S$PBB,,, | Performed by: INTERNAL MEDICINE

## 2021-07-30 PROCEDURE — 99214 OFFICE O/P EST MOD 30 MIN: CPT | Mod: PBBFAC,PO | Performed by: INTERNAL MEDICINE

## 2021-07-30 PROCEDURE — 99999 PR PBB SHADOW E&M-EST. PATIENT-LVL IV: CPT | Mod: PBBFAC,,, | Performed by: INTERNAL MEDICINE

## 2021-07-30 PROCEDURE — 99214 OFFICE O/P EST MOD 30 MIN: CPT | Mod: S$PBB,,, | Performed by: INTERNAL MEDICINE

## 2021-07-30 PROCEDURE — 99999 PR PBB SHADOW E&M-EST. PATIENT-LVL IV: ICD-10-PCS | Mod: PBBFAC,,, | Performed by: INTERNAL MEDICINE

## 2021-07-30 RX ORDER — LISINOPRIL AND HYDROCHLOROTHIAZIDE 10; 12.5 MG/1; MG/1
1 TABLET ORAL DAILY
Qty: 90 TABLET | Refills: 3 | Status: SHIPPED | OUTPATIENT
Start: 2021-07-30 | End: 2022-08-01 | Stop reason: SDUPTHER

## 2021-07-30 RX ORDER — TAMSULOSIN HYDROCHLORIDE 0.4 MG/1
1 CAPSULE ORAL DAILY
Qty: 90 CAPSULE | Refills: 3 | Status: SHIPPED | OUTPATIENT
Start: 2021-07-30 | End: 2022-08-01 | Stop reason: SDUPTHER

## 2021-07-30 RX ORDER — MOMETASONE FUROATE 1 MG/G
CREAM TOPICAL DAILY PRN
Qty: 50 G | Refills: 2 | Status: SHIPPED | OUTPATIENT
Start: 2021-07-30

## 2021-07-30 RX ORDER — PRAVASTATIN SODIUM 20 MG/1
20 TABLET ORAL DAILY
Qty: 90 TABLET | Refills: 3 | Status: SHIPPED | OUTPATIENT
Start: 2021-07-30 | End: 2022-08-01 | Stop reason: SDUPTHER

## 2021-09-22 ENCOUNTER — PES CALL (OUTPATIENT)
Dept: ADMINISTRATIVE | Facility: CLINIC | Age: 76
End: 2021-09-22

## 2021-11-01 ENCOUNTER — IMMUNIZATION (OUTPATIENT)
Dept: PHARMACY | Facility: CLINIC | Age: 76
End: 2021-11-01
Payer: MEDICARE

## 2021-11-01 DIAGNOSIS — Z23 NEED FOR VACCINATION: Primary | ICD-10-CM

## 2021-12-07 ENCOUNTER — PES CALL (OUTPATIENT)
Dept: ADMINISTRATIVE | Facility: CLINIC | Age: 76
End: 2021-12-07
Payer: MEDICARE

## 2021-12-13 ENCOUNTER — TELEPHONE (OUTPATIENT)
Dept: FAMILY MEDICINE | Facility: CLINIC | Age: 76
End: 2021-12-13
Payer: MEDICARE

## 2022-01-03 ENCOUNTER — PATIENT MESSAGE (OUTPATIENT)
Dept: ADMINISTRATIVE | Facility: OTHER | Age: 77
End: 2022-01-03
Payer: MEDICARE

## 2022-01-06 ENCOUNTER — PATIENT MESSAGE (OUTPATIENT)
Dept: FAMILY MEDICINE | Facility: CLINIC | Age: 77
End: 2022-01-06
Payer: MEDICARE

## 2022-01-06 ENCOUNTER — PATIENT MESSAGE (OUTPATIENT)
Dept: ADMINISTRATIVE | Facility: OTHER | Age: 77
End: 2022-01-06
Payer: MEDICARE

## 2022-02-10 ENCOUNTER — PES CALL (OUTPATIENT)
Dept: ADMINISTRATIVE | Facility: CLINIC | Age: 77
End: 2022-02-10
Payer: MEDICARE

## 2022-04-07 ENCOUNTER — PATIENT MESSAGE (OUTPATIENT)
Dept: ADMINISTRATIVE | Facility: OTHER | Age: 77
End: 2022-04-07
Payer: MEDICARE

## 2022-04-14 ENCOUNTER — PATIENT MESSAGE (OUTPATIENT)
Dept: FAMILY MEDICINE | Facility: CLINIC | Age: 77
End: 2022-04-14
Payer: MEDICARE

## 2022-05-05 DIAGNOSIS — M79.642 PAIN OF LEFT HAND: Primary | ICD-10-CM

## 2022-05-09 ENCOUNTER — OFFICE VISIT (OUTPATIENT)
Dept: ORTHOPEDICS | Facility: CLINIC | Age: 77
End: 2022-05-09
Attending: ORTHOPAEDIC SURGERY
Payer: MEDICARE

## 2022-05-09 ENCOUNTER — APPOINTMENT (OUTPATIENT)
Dept: RADIOLOGY | Facility: HOSPITAL | Age: 77
End: 2022-05-09
Attending: ORTHOPAEDIC SURGERY
Payer: MEDICARE

## 2022-05-09 VITALS
WEIGHT: 176.38 LBS | DIASTOLIC BLOOD PRESSURE: 60 MMHG | SYSTOLIC BLOOD PRESSURE: 136 MMHG | RESPIRATION RATE: 18 BRPM | BODY MASS INDEX: 27.62 KG/M2 | OXYGEN SATURATION: 98 % | HEART RATE: 48 BPM

## 2022-05-09 DIAGNOSIS — M79.642 PAIN OF LEFT HAND: ICD-10-CM

## 2022-05-09 DIAGNOSIS — M65.342 TRIGGER RING FINGER OF LEFT HAND: Primary | ICD-10-CM

## 2022-05-09 PROCEDURE — 73130 X-RAY EXAM OF HAND: CPT | Mod: TC,FY,PN,LT

## 2022-05-09 PROCEDURE — 20550 TENDON SHEATH: ICD-10-PCS | Mod: S$PBB,LT,, | Performed by: ORTHOPAEDIC SURGERY

## 2022-05-09 PROCEDURE — 99999 PR PBB SHADOW E&M-EST. PATIENT-LVL III: ICD-10-PCS | Mod: PBBFAC,,, | Performed by: ORTHOPAEDIC SURGERY

## 2022-05-09 PROCEDURE — 20550 NJX 1 TENDON SHEATH/LIGAMENT: CPT | Mod: PBBFAC,PN,LT | Performed by: ORTHOPAEDIC SURGERY

## 2022-05-09 PROCEDURE — 99213 OFFICE O/P EST LOW 20 MIN: CPT | Mod: 25,S$PBB,, | Performed by: ORTHOPAEDIC SURGERY

## 2022-05-09 PROCEDURE — 99213 OFFICE O/P EST LOW 20 MIN: CPT | Mod: PBBFAC,PN,25 | Performed by: ORTHOPAEDIC SURGERY

## 2022-05-09 PROCEDURE — 73130 X-RAY EXAM OF HAND: CPT | Mod: 26,LT,, | Performed by: RADIOLOGY

## 2022-05-09 PROCEDURE — 99999 PR PBB SHADOW E&M-EST. PATIENT-LVL III: CPT | Mod: PBBFAC,,, | Performed by: ORTHOPAEDIC SURGERY

## 2022-05-09 PROCEDURE — 73130 XR HAND COMPLETE 3 VIEW LEFT: ICD-10-PCS | Mod: 26,LT,, | Performed by: RADIOLOGY

## 2022-05-09 PROCEDURE — 99213 PR OFFICE/OUTPT VISIT, EST, LEVL III, 20-29 MIN: ICD-10-PCS | Mod: 25,S$PBB,, | Performed by: ORTHOPAEDIC SURGERY

## 2022-05-09 RX ORDER — TRIAMCINOLONE ACETONIDE 40 MG/ML
40 INJECTION, SUSPENSION INTRA-ARTICULAR; INTRAMUSCULAR
Status: DISCONTINUED | OUTPATIENT
Start: 2022-05-09 | End: 2022-05-09 | Stop reason: HOSPADM

## 2022-05-09 RX ADMIN — TRIAMCINOLONE ACETONIDE 40 MG: 40 INJECTION, SUSPENSION INTRA-ARTICULAR; INTRAMUSCULAR at 10:05

## 2022-05-09 NOTE — PROGRESS NOTES
Follow up visit    History of Present Illness:   Johnathan Manning comes to the office for follow up evaluation of left hand trigger finger.  Recommended treatment at the last visit included injection with relief x 1 year.   Trigger finger of ring on the left has recurred  Has been injected before  Noted mass at A1 pulley     ROS: unremarkable and no change since last visit    Physical Examination:    Vitals:    05/09/22 1011   BP: 136/60   Pulse: (!) 48   Resp: 18   SpO2: 98%   Weight: 80 kg (176 lb 5.9 oz)   PainSc:   5   PainLoc: Hand        NAD  Left hand   Triggering of ring finger   Skin dimpling consistent with early dupuytrens     Radiographic imaging: 3 views left hand - no fracture or degenerative changes     Assessment/Plan:  77 y.o. male  With LRF trigger finger    We discussed the etiology of persistent pain and further treatment options.  Injection of the left trigger finger - ring  performed, please see procedure note for more details.  Prior to the injection risks and benefits of corticosteroid injection were discussed with the patient including pain, infection, bleeding, skin color changes, swelling, steroid flare. We discussed that over time injections can result in chondral damage, acceleration of arthritis formation, damage to tendons and damage to joints.  The patient consented for the procedure.  Would recommend surgery if triggering recurs   Return for follow up visit: PRN    All questions were answered in detail. The patient  verbalized the understanding of the treatment plan and is in full agreement with the treatment plan.    Has had thisfinger injected once beforte

## 2022-05-09 NOTE — PROCEDURES
Tendon Sheath    Date/Time: 5/9/2022 10:00 AM  Performed by: Gaby Martinez MD  Authorized by: Gaby Martinez MD     Consent Done?:  Yes (Verbal)  Indications:  Pain  Timeout: prior to procedure the correct patient, procedure, and site was verified    Prep: patient was prepped and draped in usual sterile fashion      Local anesthesia used?: Yes    Local anesthetic:  Topical anesthetic  Location:  Ring finger  Site:  L ring flexor tendon sheath  Needle size:  25 G  Approach:  Volar  Medications:  40 mg triamcinolone acetonide 40 mg/mL  Patient tolerance:  Patient tolerated the procedure well with no immediate complications

## 2022-06-07 ENCOUNTER — TELEPHONE (OUTPATIENT)
Dept: FAMILY MEDICINE | Facility: CLINIC | Age: 77
End: 2022-06-07
Payer: MEDICARE

## 2022-06-07 NOTE — TELEPHONE ENCOUNTER
Left a voicemail message to inform the Patient about the EAWV appointment and to schedule.  Requested the Patient to call the office back to be schedule.  Sent a detailed message thru [employer] as well.

## 2022-07-29 ENCOUNTER — LAB VISIT (OUTPATIENT)
Dept: LAB | Facility: HOSPITAL | Age: 77
End: 2022-07-29
Attending: INTERNAL MEDICINE
Payer: MEDICARE

## 2022-07-29 DIAGNOSIS — I10 ESSENTIAL HYPERTENSION: Chronic | ICD-10-CM

## 2022-07-29 LAB
ALBUMIN SERPL BCP-MCNC: 3.7 G/DL (ref 3.5–5.2)
ALP SERPL-CCNC: 51 U/L (ref 55–135)
ALT SERPL W/O P-5'-P-CCNC: 25 U/L (ref 10–44)
ANION GAP SERPL CALC-SCNC: 8 MMOL/L (ref 8–16)
AST SERPL-CCNC: 17 U/L (ref 10–40)
BASOPHILS # BLD AUTO: 0.09 K/UL (ref 0–0.2)
BASOPHILS NFR BLD: 1.2 % (ref 0–1.9)
BILIRUB SERPL-MCNC: 0.6 MG/DL (ref 0.1–1)
BUN SERPL-MCNC: 16 MG/DL (ref 8–23)
CALCIUM SERPL-MCNC: 9.1 MG/DL (ref 8.7–10.5)
CHLORIDE SERPL-SCNC: 104 MMOL/L (ref 95–110)
CHOLEST SERPL-MCNC: 171 MG/DL (ref 120–199)
CHOLEST/HDLC SERPL: 3.2 {RATIO} (ref 2–5)
CO2 SERPL-SCNC: 28 MMOL/L (ref 23–29)
CREAT SERPL-MCNC: 1 MG/DL (ref 0.5–1.4)
DIFFERENTIAL METHOD: NORMAL
EOSINOPHIL # BLD AUTO: 0.3 K/UL (ref 0–0.5)
EOSINOPHIL NFR BLD: 4.3 % (ref 0–8)
ERYTHROCYTE [DISTWIDTH] IN BLOOD BY AUTOMATED COUNT: 13.3 % (ref 11.5–14.5)
EST. GFR  (AFRICAN AMERICAN): >60 ML/MIN/1.73 M^2
EST. GFR  (NON AFRICAN AMERICAN): >60 ML/MIN/1.73 M^2
GLUCOSE SERPL-MCNC: 92 MG/DL (ref 70–110)
HCT VFR BLD AUTO: 43.2 % (ref 40–54)
HDLC SERPL-MCNC: 54 MG/DL (ref 40–75)
HDLC SERPL: 31.6 % (ref 20–50)
HGB BLD-MCNC: 14 G/DL (ref 14–18)
IMM GRANULOCYTES # BLD AUTO: 0.01 K/UL (ref 0–0.04)
IMM GRANULOCYTES NFR BLD AUTO: 0.1 % (ref 0–0.5)
LDLC SERPL CALC-MCNC: 97.6 MG/DL (ref 63–159)
LYMPHOCYTES # BLD AUTO: 2 K/UL (ref 1–4.8)
LYMPHOCYTES NFR BLD: 26.2 % (ref 18–48)
MCH RBC QN AUTO: 29 PG (ref 27–31)
MCHC RBC AUTO-ENTMCNC: 32.4 G/DL (ref 32–36)
MCV RBC AUTO: 90 FL (ref 82–98)
MONOCYTES # BLD AUTO: 0.9 K/UL (ref 0.3–1)
MONOCYTES NFR BLD: 12 % (ref 4–15)
NEUTROPHILS # BLD AUTO: 4.2 K/UL (ref 1.8–7.7)
NEUTROPHILS NFR BLD: 56.2 % (ref 38–73)
NONHDLC SERPL-MCNC: 117 MG/DL
NRBC BLD-RTO: 0 /100 WBC
PLATELET # BLD AUTO: 285 K/UL (ref 150–450)
PMV BLD AUTO: 11.8 FL (ref 9.2–12.9)
POTASSIUM SERPL-SCNC: 4.1 MMOL/L (ref 3.5–5.1)
PROT SERPL-MCNC: 6.5 G/DL (ref 6–8.4)
RBC # BLD AUTO: 4.82 M/UL (ref 4.6–6.2)
SODIUM SERPL-SCNC: 140 MMOL/L (ref 136–145)
TRIGL SERPL-MCNC: 97 MG/DL (ref 30–150)
WBC # BLD AUTO: 7.47 K/UL (ref 3.9–12.7)

## 2022-07-29 PROCEDURE — 85025 COMPLETE CBC W/AUTO DIFF WBC: CPT | Performed by: INTERNAL MEDICINE

## 2022-07-29 PROCEDURE — 36415 COLL VENOUS BLD VENIPUNCTURE: CPT | Mod: PO | Performed by: INTERNAL MEDICINE

## 2022-07-29 PROCEDURE — 80061 LIPID PANEL: CPT | Performed by: INTERNAL MEDICINE

## 2022-07-29 PROCEDURE — 80053 COMPREHEN METABOLIC PANEL: CPT | Performed by: INTERNAL MEDICINE

## 2022-08-01 ENCOUNTER — OFFICE VISIT (OUTPATIENT)
Dept: FAMILY MEDICINE | Facility: CLINIC | Age: 77
End: 2022-08-01
Payer: MEDICARE

## 2022-08-01 VITALS
WEIGHT: 173.19 LBS | HEART RATE: 67 BPM | DIASTOLIC BLOOD PRESSURE: 68 MMHG | BODY MASS INDEX: 27.18 KG/M2 | SYSTOLIC BLOOD PRESSURE: 128 MMHG | OXYGEN SATURATION: 98 % | HEIGHT: 67 IN | TEMPERATURE: 98 F

## 2022-08-01 DIAGNOSIS — N40.1 BENIGN NON-NODULAR PROSTATIC HYPERPLASIA WITH LOWER URINARY TRACT SYMPTOMS: ICD-10-CM

## 2022-08-01 DIAGNOSIS — I10 ESSENTIAL HYPERTENSION: Primary | ICD-10-CM

## 2022-08-01 DIAGNOSIS — E78.5 HYPERLIPIDEMIA, UNSPECIFIED HYPERLIPIDEMIA TYPE: ICD-10-CM

## 2022-08-01 DIAGNOSIS — Z00.00 ANNUAL PHYSICAL EXAM: ICD-10-CM

## 2022-08-01 DIAGNOSIS — R09.82 PND (POST-NASAL DRIP): ICD-10-CM

## 2022-08-01 PROCEDURE — 99214 OFFICE O/P EST MOD 30 MIN: CPT | Mod: S$PBB,,, | Performed by: INTERNAL MEDICINE

## 2022-08-01 PROCEDURE — 99999 PR PBB SHADOW E&M-EST. PATIENT-LVL III: CPT | Mod: PBBFAC,,, | Performed by: INTERNAL MEDICINE

## 2022-08-01 PROCEDURE — 99213 OFFICE O/P EST LOW 20 MIN: CPT | Mod: PBBFAC,PO | Performed by: INTERNAL MEDICINE

## 2022-08-01 PROCEDURE — 99999 PR PBB SHADOW E&M-EST. PATIENT-LVL III: ICD-10-PCS | Mod: PBBFAC,,, | Performed by: INTERNAL MEDICINE

## 2022-08-01 PROCEDURE — 99214 PR OFFICE/OUTPT VISIT, EST, LEVL IV, 30-39 MIN: ICD-10-PCS | Mod: S$PBB,,, | Performed by: INTERNAL MEDICINE

## 2022-08-01 RX ORDER — LISINOPRIL AND HYDROCHLOROTHIAZIDE 10; 12.5 MG/1; MG/1
1 TABLET ORAL DAILY
Qty: 90 TABLET | Refills: 3 | OUTPATIENT
Start: 2022-08-01 | End: 2022-08-01 | Stop reason: SDUPTHER

## 2022-08-01 RX ORDER — TAMSULOSIN HYDROCHLORIDE 0.4 MG/1
1 CAPSULE ORAL DAILY
Qty: 90 CAPSULE | Refills: 3 | Status: SHIPPED | OUTPATIENT
Start: 2022-08-01 | End: 2022-09-28 | Stop reason: SDUPTHER

## 2022-08-01 RX ORDER — LISINOPRIL AND HYDROCHLOROTHIAZIDE 10; 12.5 MG/1; MG/1
1 TABLET ORAL DAILY
Qty: 90 TABLET | Refills: 3 | Status: SHIPPED | OUTPATIENT
Start: 2022-08-01 | End: 2023-07-29

## 2022-08-01 RX ORDER — PRAVASTATIN SODIUM 20 MG/1
20 TABLET ORAL DAILY
Qty: 90 TABLET | Refills: 3 | OUTPATIENT
Start: 2022-08-01 | End: 2022-08-01 | Stop reason: SDUPTHER

## 2022-08-01 RX ORDER — TAMSULOSIN HYDROCHLORIDE 0.4 MG/1
1 CAPSULE ORAL DAILY
Qty: 90 CAPSULE | Refills: 3 | OUTPATIENT
Start: 2022-08-01 | End: 2022-08-01 | Stop reason: SDUPTHER

## 2022-08-01 RX ORDER — PRAVASTATIN SODIUM 20 MG/1
20 TABLET ORAL DAILY
Qty: 90 TABLET | Refills: 3 | Status: SHIPPED | OUTPATIENT
Start: 2022-08-01 | End: 2023-05-23 | Stop reason: ALTCHOICE

## 2022-08-01 NOTE — PROGRESS NOTES
__________________________________________________________________________________________________________________________________________________  I attest that I have reviewed the student note and that the components of the history of present illness, the physical exam, and the assessment and plan documented were performed by me or were performed in my presence by the student. I have verified (and addended if appropriate) the documentation and performed (or re-performed) the exam and medical decision making.     Problem List Items Addressed This Visit        Cardiac/Vascular    Hyperlipidemia (Chronic)    Current Assessment & Plan     The current medical regimen is effective;  continue present plan and medications.            Relevant Medications    pravastatin (PRAVACHOL) 20 MG tablet    Essential hypertension - Primary (Chronic)    Current Assessment & Plan     The current medical regimen is effective;  continue present plan and medications.            Relevant Medications    lisinopriL-hydrochlorothiazide (PRINZIDE,ZESTORETIC) 10-12.5 mg per tablet    Other Relevant Orders    CBC Auto Differential    Comprehensive Metabolic Panel    Lipid Panel       Renal/    Benign non-nodular prostatic hyperplasia with lower urinary tract symptoms (Chronic)    Current Assessment & Plan     The current medical regimen is effective;  continue present plan and medications.            Relevant Medications    tamsulosin (FLOMAX) 0.4 mg Cap      Other Visit Diagnoses     Annual physical exam    -  Discussed healthy diet, regular exercise, necessary labs, age appropriate cancer screening, and routine vaccinations.       PND (post-nasal drip)    -  Ok for anthi-histamine PRN.           Follow up in about 1 year (around 8/1/2023) for follow up for chronic conditions.    Bernard CRAIN  Mollere    __________________________________________________________________________________________________________________________________________________      Chief Complaint  Chief Complaint   Patient presents with    Hypertension    Hyperlipidemia       HPI  Johnathan Waters is a 77 y.o. male with multiple medical diagnoses as listed in the medical history and problem list that presents for his HTN HLD follow up. His last appointment with primary care was 7/30/2021.      Pt presents today generally well. Reports mild, night-time post nasal drip which is alleviated by antihistamines and aggravated by nothing. Endorses feeling refreshed on waking.Taking all medications as prescribed and denies A/Es. Denies F/C, CP, SOB, cough.    Pt retired two months ago and is adjusting to new routine. States that he is exercising more (spin class, recumbent bike). Monitors BP at home and through digital med with systolics in the 110-120's on average.    PAST MEDICAL HISTORY:  Past Medical History:   Diagnosis Date    Elevated cholesterol     Hypertension     Squamous cell carcinoma     under right eye.  Sees outside Dermatology    Trigger ring finger of right hand 1/30/2019       PAST SURGICAL HISTORY:  Past Surgical History:   Procedure Laterality Date    COLONOSCOPY N/A 9/22/2020    Procedure: COLONOSCOPY;  Surgeon: Manuela Ward MD;  [facility];  Service: Endoscopy;  Laterality: N/A;    EYE SURGERY      VASECTOMY         SOCIAL HISTORY:  Social History     Socioeconomic History    Marital status:    Occupational History    Occupation: Retired   Tobacco Use    Smoking status: Never Smoker    Smokeless tobacco: Never Used   Substance and Sexual Activity    Alcohol use: Yes     Comment: Rarely    Drug use: No    Sexual activity: Yes     Partners: Female     Comment:      Social Determinants of Health     Financial Resource Strain: Low Risk     Difficulty of Paying Living  Expenses: Not hard at all   Food Insecurity: No Food Insecurity    Worried About Running Out of Food in the Last Year: Never true    Ran Out of Food in the Last Year: Never true   Transportation Needs: No Transportation Needs    Lack of Transportation (Medical): No    Lack of Transportation (Non-Medical): No   Physical Activity: Sufficiently Active    Days of Exercise per Week: 4 days    Minutes of Exercise per Session: 60 min   Stress: No Stress Concern Present    Feeling of Stress : Not at all   Social Connections: Unknown    Frequency of Communication with Friends and Family: Once a week    Frequency of Social Gatherings with Friends and Family: Once a week    Active Member of Clubs or Organizations: No    Attends Club or Organization Meetings: Never    Marital Status:    Housing Stability: High Risk    Unable to Pay for Housing in the Last Year: Yes    Number of Places Lived in the Last Year: 1    Unstable Housing in the Last Year: No       FAMILY HISTORY:  Family History   Problem Relation Age of Onset    Hypertension Mother     Cancer Father         lung    Diabetes Sister     Asthma Daughter     No Known Problems Daughter        ALLERGIES AND MEDICATIONS: updated and reviewed.  Review of patient's allergies indicates:   Allergen Reactions    Wasp venom Itching and Swelling    Sulfa (sulfonamide antibiotics) Other (See Comments)     Patient unsure, toddler     Current Outpatient Medications   Medication Sig Dispense Refill    mometasone 0.1% (ELOCON) 0.1 % cream Apply topically daily as needed (rash). 50 g 2    ibuprofen (ADVIL,MOTRIN) 600 MG tablet       lisinopriL-hydrochlorothiazide (PRINZIDE,ZESTORETIC) 10-12.5 mg per tablet Take 1 tablet by mouth once daily. 90 tablet 3    pravastatin (PRAVACHOL) 20 MG tablet Take 1 tablet (20 mg total) by mouth once daily. 90 tablet 3    tamsulosin (FLOMAX) 0.4 mg Cap Take 1 capsule (0.4 mg total) by mouth once daily. 90 capsule 3     No  "current facility-administered medications for this visit.         ROS  Review of Systems   Constitutional: Positive for activity change. Negative for unexpected weight change.   HENT: Negative for hearing loss, rhinorrhea and trouble swallowing.    Eyes: Negative for discharge and visual disturbance.   Respiratory: Negative for chest tightness and wheezing.    Cardiovascular: Negative for chest pain and palpitations.   Gastrointestinal: Negative for blood in stool, constipation, diarrhea and vomiting.   Endocrine: Negative for polydipsia and polyuria.   Genitourinary: Negative for difficulty urinating, hematuria and urgency.   Musculoskeletal: Negative for arthralgias, joint swelling and neck pain.   Skin: Negative for rash.   Neurological: Negative for weakness and headaches.   Psychiatric/Behavioral: Negative for confusion and dysphoric mood.           Physical Exam  Vitals:    08/01/22 1327   BP: 128/68   BP Location: Right arm   Patient Position: Sitting   BP Method: Large (Manual)   Pulse: 67   Temp: 98.4 °F (36.9 °C)   TempSrc: Oral   SpO2: 98%   Weight: 78.5 kg (173 lb 2.7 oz)   Height: 5' 7" (1.702 m)    Body mass index is 27.12 kg/m².  Weight: 78.5 kg (173 lb 2.7 oz)   Height: 5' 7" (170.2 cm)   Physical Exam  Constitutional:       Appearance: Normal appearance.   HENT:      Head: Normocephalic and atraumatic.      Right Ear: Tympanic membrane, ear canal and external ear normal.      Left Ear: Tympanic membrane, ear canal and external ear normal.      Nose: Nose normal.      Mouth/Throat:      Mouth: Mucous membranes are moist.      Pharynx: Oropharynx is clear.   Eyes:      Extraocular Movements: Extraocular movements intact.      Conjunctiva/sclera: Conjunctivae normal.      Pupils: Pupils are equal, round, and reactive to light.   Neck:      Vascular: No carotid bruit.   Cardiovascular:      Rate and Rhythm: Normal rate and regular rhythm.      Pulses: Normal pulses.      Heart sounds: Murmur (2/6 " systolic murmur, loudest @ L parasternal border, no carotid radiation) heard.   Pulmonary:      Effort: Pulmonary effort is normal.      Breath sounds: Normal breath sounds.   Abdominal:      General: Bowel sounds are normal. There is no distension.      Palpations: Abdomen is soft.      Tenderness: There is no abdominal tenderness.   Musculoskeletal:         General: Normal range of motion.      Cervical back: Normal range of motion.      Right lower leg: No edema.      Left lower leg: No edema.   Skin:     General: Skin is warm and dry.      Capillary Refill: Capillary refill takes less than 2 seconds.   Neurological:      Mental Status: He is alert and oriented to person, place, and time.   Psychiatric:         Mood and Affect: Mood normal.         Behavior: Behavior normal.         Thought Content: Thought content normal.         Judgment: Judgment normal.           Health Maintenance       Date Due Completion Date    COVID-19 Vaccine (3 - Moderna risk series) 11/29/2021 11/1/2021    Influenza Vaccine (1) 09/01/2022 10/17/2021    TETANUS VACCINE 12/26/2024 12/26/2014    Lipid Panel 07/29/2027 7/29/2022    Colonoscopy 09/22/2030 9/22/2020    Override on 6/1/2017: Declined (will proceed with stool cards instead)    Override on 3/3/2006: Done (normal.  In [employer] documents)            Assessment and Plan:    Mr. Waters is a 78 y/o M presenting today for his annual physical exam.    Annual physical exam   Follow up on 1 year    Essential hypertension   The current medical regimen is effective;  continue present plan and medications.    - lisinopriL-hydrochlorothiazide (PRINZIDE,ZESTORETIC) 10-12.5 mg per tablet PO daily    Hyperlipidemia, unspecified hyperlipidemia type   The current medical regimen is effective;  continue present plan and medications.    - pravastatin (PRAVACHOL) 20 MG tablet PO daily    Benign non-nodular prostatic hyperplasia with lower urinary tract symptoms   The current medical regimen is  effective;  continue present plan and medications.    - tamsulosin (FLOMAX) 0.4 mg Cap PO daily    Post Nasal Drip   Counseled pt on use of different OCT antihistamines    - OTC antihistamine PRN

## 2022-08-01 NOTE — Clinical Note
Good afternoon,  This pt is reporting that the 3rd dose he got was a full dose and not a half dose.  Can we correct this?  Jay

## 2022-09-28 DIAGNOSIS — N40.1 BENIGN NON-NODULAR PROSTATIC HYPERPLASIA WITH LOWER URINARY TRACT SYMPTOMS: ICD-10-CM

## 2022-09-28 RX ORDER — TAMSULOSIN HYDROCHLORIDE 0.4 MG/1
1 CAPSULE ORAL DAILY
Qty: 90 CAPSULE | Refills: 3 | Status: SHIPPED | OUTPATIENT
Start: 2022-09-28 | End: 2023-08-02 | Stop reason: SDUPTHER

## 2022-09-28 NOTE — TELEPHONE ENCOUNTER
Refill Decision Note   Johnathan Waters  is requesting a refill authorization.  Brief Assessment and Rationale for Refill:  Approve     Medication Therapy Plan:       Medication Reconciliation Completed: No   Comments:     No Care Gaps recommended.     Note composed:11:31 AM 09/28/2022

## 2022-09-28 NOTE — TELEPHONE ENCOUNTER
No new care gaps identified.  [facility] Embedded Care Gaps. Reference number: [id number]. 9/28/2022   10:13:54 AM DEMETRIT

## 2022-10-04 ENCOUNTER — PATIENT MESSAGE (OUTPATIENT)
Dept: ADMINISTRATIVE | Facility: OTHER | Age: 77
End: 2022-10-04
Payer: MEDICARE

## 2023-02-16 ENCOUNTER — OFFICE VISIT (OUTPATIENT)
Dept: ORTHOPEDICS | Facility: CLINIC | Age: 78
End: 2023-02-16
Payer: MEDICARE

## 2023-02-16 DIAGNOSIS — M25.642 MORNING JOINT STIFFNESS OF LEFT HAND: Primary | ICD-10-CM

## 2023-02-16 DIAGNOSIS — M65.342 TRIGGER FINGER, LEFT RING FINGER: ICD-10-CM

## 2023-02-16 PROCEDURE — 99213 OFFICE O/P EST LOW 20 MIN: CPT | Mod: S$PBB,,, | Performed by: ORTHOPAEDIC SURGERY

## 2023-02-16 PROCEDURE — 99213 PR OFFICE/OUTPT VISIT, EST, LEVL III, 20-29 MIN: ICD-10-PCS | Mod: S$PBB,,, | Performed by: ORTHOPAEDIC SURGERY

## 2023-02-16 PROCEDURE — 99999 PR PBB SHADOW E&M-EST. PATIENT-LVL III: ICD-10-PCS | Mod: PBBFAC,,, | Performed by: ORTHOPAEDIC SURGERY

## 2023-02-16 PROCEDURE — 99999 PR PBB SHADOW E&M-EST. PATIENT-LVL III: CPT | Mod: PBBFAC,,, | Performed by: ORTHOPAEDIC SURGERY

## 2023-02-16 PROCEDURE — 99213 OFFICE O/P EST LOW 20 MIN: CPT | Mod: PBBFAC,PN | Performed by: ORTHOPAEDIC SURGERY

## 2023-02-16 NOTE — PROGRESS NOTES
Orthopedics H&P    History of Present Illness:   Johnathan Manning comes to the office for follow up evaluation of left hand trigger finger.     Previously seen for LRF trigger finger. Resolved with injection but is starting to recur     Also has a new complain of swelling and stiffness of the PIP joint of the LRF that started about 2 weeks ago   Unable to wear his wedding ring because of this  No injuries or unusual activities   RHD    ROS: unremarkable and no change since last visit    Physical Examination:    Vitals:    02/16/23 1310   PainSc:   2   PainLoc: Hand        NAD  Cards: RRR per distal pulses   Resp: breathing even and unlabored   Left hand   Triggering of ring finger   Skin dimpling consistent with early dupuytrens   Mild swelling to PIP of ring finger   LTSI m/u/r  2+ RP  + EPL, IO, FDS, FDP     Radiographic imaging: 3 views left hand - no fractures. Mild degenerative changes at multiple IP joints    Assessment/Plan:  77 y.o. male  With LRF trigger finger, hand arthritis     All questions were answered in detail. The patient  verbalized the understanding of the treatment plan and is in full agreement with the treatment plan.    The spectrum of treatment options were discussed with the patient, including nonoperative and operative options.  After thorough discussion, the patient has elected to undergo surgical treatment to include:    left ring finger A1 pulley release     We have discussed the surgery and anticipated recovery.  He understands that there may be limited mobility up to several weeks after surgery depending on procedures that are performed at the time of surgery.    The details of the surgical procedure were explained, including the location of probable incisions and a description of likely hardware and/or grafts to be used.  The patient understands the likely convalescence after surgery, in particular the expected postop rehab and recovery course. Alternatives both operative and  non-operative with associated risks and benefits discussed. The outlined risks and potential complications of the proposed procedure include but are not limited to: bleeding, infection, vessel and/or nerve damage, pain, numbness, tingling,  need for additional surgery, failure to return to pre-injury and/or preoperative functional status, inability to return to work, scar sensitivity, delayed healing, complex regional pain syndrome, weakness, pulley injury, tendon injury, bowstringing , partial and/or incomplete relief of symptoms, persistence of and/or worsening of symptoms, stiffness, decreased  strength, need for prolonged postoperative rehabilitation, deep venous thrombosis, pulmonary embolism, and death.  The patient states an understanding and wishes to proceed with surgery.   All questions were answered.  No guarantees were implied or stated.  Written informed consent was obtained.    He was also informed and understands the risks of surgery are greater for patients with a current condition or history of heart disease, obesity, clotting disorders, recurrent infections, steroid use, current or past smoking, and factors such as sedentary lifestyle and noncompliance with medications, therapy or follow-up. The degree of the increased risk is hard to estimate with any degree of precision.    All questions were answered. The patient has verbalized understanding of these issues and wishes to proceed as discussed.     Wants it done under local only

## 2023-03-14 ENCOUNTER — HOSPITAL ENCOUNTER (OUTPATIENT)
Dept: PREADMISSION TESTING | Facility: HOSPITAL | Age: 78
Discharge: HOME | End: 2023-03-14
Attending: ORTHOPAEDIC SURGERY
Payer: MEDICARE

## 2023-03-14 VITALS
DIASTOLIC BLOOD PRESSURE: 66 MMHG | SYSTOLIC BLOOD PRESSURE: 140 MMHG | HEIGHT: 67 IN | BODY MASS INDEX: 27.44 KG/M2 | OXYGEN SATURATION: 100 % | TEMPERATURE: 97 F | RESPIRATION RATE: 18 BRPM | WEIGHT: 174.81 LBS | HEART RATE: 51 BPM

## 2023-03-14 DIAGNOSIS — Z01.818 PREOP TESTING: Primary | ICD-10-CM

## 2023-03-14 LAB
ALBUMIN SERPL BCP-MCNC: 3.6 G/DL (ref 3.5–5.2)
ALP SERPL-CCNC: 53 U/L (ref 55–135)
ALT SERPL W/O P-5'-P-CCNC: 17 U/L (ref 10–44)
ANION GAP SERPL CALC-SCNC: 9 MMOL/L (ref 8–16)
AST SERPL-CCNC: 14 U/L (ref 10–40)
BASOPHILS # BLD AUTO: 0.06 K/UL (ref 0–0.2)
BASOPHILS NFR BLD: 0.8 % (ref 0–1.9)
BILIRUB SERPL-MCNC: 0.7 MG/DL (ref 0.1–1)
BUN SERPL-MCNC: 20 MG/DL (ref 8–23)
CALCIUM SERPL-MCNC: 8.8 MG/DL (ref 8.7–10.5)
CHLORIDE SERPL-SCNC: 105 MMOL/L (ref 95–110)
CO2 SERPL-SCNC: 27 MMOL/L (ref 23–29)
CREAT SERPL-MCNC: 1.1 MG/DL (ref 0.5–1.4)
DIFFERENTIAL METHOD: ABNORMAL
EOSINOPHIL # BLD AUTO: 0.4 K/UL (ref 0–0.5)
EOSINOPHIL NFR BLD: 5.4 % (ref 0–8)
ERYTHROCYTE [DISTWIDTH] IN BLOOD BY AUTOMATED COUNT: 13.6 % (ref 11.5–14.5)
EST. GFR  (NO RACE VARIABLE): >60 ML/MIN/1.73 M^2
GLUCOSE SERPL-MCNC: 96 MG/DL (ref 70–110)
HCT VFR BLD AUTO: 39.2 % (ref 40–54)
HGB BLD-MCNC: 13.3 G/DL (ref 14–18)
IMM GRANULOCYTES # BLD AUTO: 0.02 K/UL (ref 0–0.04)
IMM GRANULOCYTES NFR BLD AUTO: 0.3 % (ref 0–0.5)
LYMPHOCYTES # BLD AUTO: 1.6 K/UL (ref 1–4.8)
LYMPHOCYTES NFR BLD: 22.1 % (ref 18–48)
MCH RBC QN AUTO: 29.4 PG (ref 27–31)
MCHC RBC AUTO-ENTMCNC: 33.9 G/DL (ref 32–36)
MCV RBC AUTO: 87 FL (ref 82–98)
MONOCYTES # BLD AUTO: 0.7 K/UL (ref 0.3–1)
MONOCYTES NFR BLD: 10.3 % (ref 4–15)
NEUTROPHILS # BLD AUTO: 4.4 K/UL (ref 1.8–7.7)
NEUTROPHILS NFR BLD: 61.1 % (ref 38–73)
NRBC BLD-RTO: 0 /100 WBC
PLATELET # BLD AUTO: 267 K/UL (ref 150–450)
PMV BLD AUTO: 12 FL (ref 9.2–12.9)
POTASSIUM SERPL-SCNC: 3.4 MMOL/L (ref 3.5–5.1)
PROT SERPL-MCNC: 6.4 G/DL (ref 6–8.4)
RBC # BLD AUTO: 4.52 M/UL (ref 4.6–6.2)
SODIUM SERPL-SCNC: 141 MMOL/L (ref 136–145)
WBC # BLD AUTO: 7.18 K/UL (ref 3.9–12.7)

## 2023-03-14 PROCEDURE — 93005 ELECTROCARDIOGRAM TRACING: CPT

## 2023-03-14 PROCEDURE — 85025 COMPLETE CBC W/AUTO DIFF WBC: CPT | Performed by: ORTHOPAEDIC SURGERY

## 2023-03-14 PROCEDURE — 93010 EKG 12-LEAD: ICD-10-PCS | Mod: ,,, | Performed by: INTERNAL MEDICINE

## 2023-03-14 PROCEDURE — 93010 ELECTROCARDIOGRAM REPORT: CPT | Mod: ,,, | Performed by: INTERNAL MEDICINE

## 2023-03-14 PROCEDURE — 80053 COMPREHEN METABOLIC PANEL: CPT | Performed by: ORTHOPAEDIC SURGERY

## 2023-03-14 PROCEDURE — 36415 COLL VENOUS BLD VENIPUNCTURE: CPT | Performed by: ORTHOPAEDIC SURGERY

## 2023-03-14 NOTE — DISCHARGE INSTRUCTIONS

## 2023-03-14 NOTE — PRE-PROCEDURE INSTRUCTIONS
Patient requesting to see me even though he is planning on Local anes but in case he needs anesthesia. We will get labs and EKG.

## 2023-03-21 ENCOUNTER — HOSPITAL ENCOUNTER (OUTPATIENT)
Facility: HOSPITAL | Age: 78
Discharge: HOME | End: 2023-03-21
Attending: ORTHOPAEDIC SURGERY | Admitting: ORTHOPAEDIC SURGERY
Payer: MEDICARE

## 2023-03-21 VITALS
OXYGEN SATURATION: 100 % | WEIGHT: 174.81 LBS | RESPIRATION RATE: 16 BRPM | BODY MASS INDEX: 27.38 KG/M2 | HEART RATE: 58 BPM | SYSTOLIC BLOOD PRESSURE: 161 MMHG | DIASTOLIC BLOOD PRESSURE: 70 MMHG | TEMPERATURE: 98 F

## 2023-03-21 DIAGNOSIS — M25.642 MORNING JOINT STIFFNESS OF LEFT HAND: ICD-10-CM

## 2023-03-21 DIAGNOSIS — M65.342 TRIGGER FINGER, LEFT RING FINGER: Primary | ICD-10-CM

## 2023-03-21 PROCEDURE — 26055 INCISE FINGER TENDON SHEATH: CPT | Mod: F3,,, | Performed by: ORTHOPAEDIC SURGERY

## 2023-03-21 PROCEDURE — 71000015 HC POSTOP RECOV 1ST HR: Performed by: ORTHOPAEDIC SURGERY

## 2023-03-21 PROCEDURE — 36000707: Performed by: ORTHOPAEDIC SURGERY

## 2023-03-21 PROCEDURE — 36000706: Performed by: ORTHOPAEDIC SURGERY

## 2023-03-21 PROCEDURE — 25000003 PHARM REV CODE 250: Performed by: ORTHOPAEDIC SURGERY

## 2023-03-21 PROCEDURE — 26055 PR INCISE FINGER TENDON SHEATH: ICD-10-PCS | Mod: F3,,, | Performed by: ORTHOPAEDIC SURGERY

## 2023-03-21 RX ORDER — ONDANSETRON 4 MG/1
4 TABLET, FILM COATED ORAL EVERY 8 HOURS PRN
Qty: 10 TABLET | Refills: 0 | Status: SHIPPED | OUTPATIENT
Start: 2023-03-21 | End: 2023-06-14

## 2023-03-21 RX ORDER — LIDOCAINE HYDROCHLORIDE 10 MG/ML
INJECTION INFILTRATION; PERINEURAL
Status: DISCONTINUED | OUTPATIENT
Start: 2023-03-21 | End: 2023-03-21 | Stop reason: HOSPADM

## 2023-03-21 RX ORDER — HYDROCODONE BITARTRATE AND ACETAMINOPHEN 5; 325 MG/1; MG/1
1 TABLET ORAL EVERY 6 HOURS PRN
Qty: 15 TABLET | Refills: 0 | Status: SHIPPED | OUTPATIENT
Start: 2023-03-21 | End: 2023-06-14

## 2023-03-21 RX ORDER — IBUPROFEN 800 MG/1
800 TABLET ORAL 3 TIMES DAILY
Qty: 30 TABLET | Refills: 0 | Status: SHIPPED | OUTPATIENT
Start: 2023-03-21 | End: 2023-03-31

## 2023-03-21 NOTE — DISCHARGE INSTRUCTIONS
ACTIVITY LEVEL: If you have received sedation or an anesthetic, you may feel sleepy for several hours. Rest  until you are more awake. Gradually resume your normal activities.    DIET: You may resume your home diet. If nausea is present, increase your diet gradually with fluids and bland  foods.    Medications: Pain medication should be taken only if needed and as directed. If antibiotics are prescribed, the  medication should be taken until completed. You will be given an updated list of you medications.    No driving, alcoholic beverages or signing legal documents for next 24 hours or while  taking pain medication.    Elevate the affected extremity to a level above your heart and ice packs may be applied in  intervals of 15-20 minutes on 15-20 minutes off while awake    CALL THE DOCTOR:  For any obvious bleeding (some dried blood over the incision is normal).  Redness, swelling, foul smell around incision or fever over 101.  Shortness of breath, Coughing up Bloody Sputum or Pains or Swelling in your Calves.  Persistent pain or nausea not relieved by medication.  Impaired circulation such as tingling, change in color, numbness or tingling, coldness.    If any unusual problems or difficulties occur contact your doctor. If you cannot contact your doctor but  feel your signs and symptoms warrant a physicians attention return to the emergency room.

## 2023-03-21 NOTE — BRIEF OP NOTE
[facility] - Surgery  Brief Operative Note    Surgery Date: 3/21/2023     Surgeon(s) and Role:     * Gaby Martinez MD - Primary    Assisting Surgeon: None    Pre-op Diagnosis:  Trigger finger, left ring finger [M65.342]    Post-op Diagnosis:  Post-Op Diagnosis Codes:     * Trigger finger, left ring finger [M65.342]    Procedure(s) (LRB):  RELEASE, TRIGGER FINGER (Left)    Anesthesia: Local    Operative Findings: trigger finger left ring finger    Estimated Blood Loss: <1 cc         Specimens: none    Discharge Note    OUTCOME: Patient tolerated treatment/procedure well without complication and is now ready for discharge.    DISPOSITION: Home or Self Care    FINAL DIAGNOSIS:  Trigger finger, left ring finger    FOLLOWUP: In clinic    DISCHARGE INSTRUCTIONS:    Discharge Procedure Orders   Keep surgical extremity elevated     Ice to affected area     Lifting restrictions     Notify your health care provider if you experience any of the following:  temperature >100.4     Notify your health care provider if you experience any of the following:  severe uncontrolled pain     Notify your health care provider if you experience any of the following:  redness, tenderness, or signs of infection (pain, swelling, redness, odor or green/yellow discharge around incision site)     Leave dressing on - Keep it clean, dry, and intact until clinic visit

## 2023-03-21 NOTE — OP NOTE
Operative Note      Date of Procedure:   3/21/2023      Attending Physician:   Gaby Martinez MD    Assistant:  Angella Damian PA-C     Pre-Op Diagnosis:   left ring finger trigger finger     Post-Op Diagnosis:   left ring finger trigger finger     Procedure:   Left ring finger A1 pulley release     Implants:  None     Estimated Blood Loss:   Less than 5 ml     Complications:   None     Tourniquet Time:   5 minutes      Specimens:   None     Drains:   None     Anesthesia: Local with MAC (with limited sedation)     Indications:   This is a 77 y.o. male with a history of left ring finger trigger finger.  We discussed risks and benefits of injection versus trigger finger release.  He elected to proceed with surgical treatment and consented to procedure in writing.     Procedure in detail:  The patient was identified and marked in the pre operative holding area. He elected for local only anesthesia which was performed with injection of 2.5 cc 1% lidocaine in line with the planned incision and into the tendon sheath.  He was then brought to the operating room and placed supine on the operating table. All bony promineces were padded and a nonsterile tourniquet was applied to the upper arm. The left upper extremity was prepped and draped in the normal sterile fashion.  A time out was called confirming the correct site, side, patient and procedure.  No antibiotics were given per hospital protocol.  The arm was exsanguinated using an Esmarch bandage and the tourniquet was inflated to 250 mmHg.  A vertical incision was made over the A1 pulley of the ring finger. Metzenbaum scissors were used to bluntly dissect through the subcutaneous tissues and the proximal and distal extent of the A1 pulley was identified. The neurovascular structures were protected with retractors. The A1 pulley was released with the scissors.  The patient actively flexed and extended with digit without evidence of triggering. The tourniquet was let down  and no significant bleeding was noted. The skin incision was closed using 3-0 nylon and sterile dressings were applied.  No splint was needed. The patient was awakened from anesthesia and brought to the recovery room without complication.         All counts were correct at the end of the procedure.      Post operative plan:  Keep dressing on until seen in clinic  Follow-up in clinic in 2 weeks for dressing and suture removal  Limit weightlifting to less than 5 lb  Start tendon gliding exercises of all digits immediately

## 2023-03-21 NOTE — H&P
Orthopedics H&P     History of Present Illness:   Johnathan Manning comes to the office for follow up evaluation of left hand trigger finger.      Previously seen for LRF trigger finger. Resolved with injection but is starting to recur      Also has a new complain of swelling and stiffness of the PIP joint of the LRF that started about 2 weeks ago   Unable to wear his wedding ring because of this  No injuries or unusual activities   RHD    3/21/23  Here for surgery   No changes      ROS: unremarkable and no change since last visit     Physical Examination:        Vitals:     02/16/23 1310   PainSc:   2   PainLoc: Hand         NAD  Cards: RRR per distal pulses   Resp: breathing even and unlabored   Left hand   Triggering of ring finger   Skin dimpling consistent with early dupuytrens   Mild swelling to PIP of ring finger   LTSI m/u/r  2+ RP  + EPL, IO, FDS, FDP      Radiographic imaging: 3 views left hand - no fractures. Mild degenerative changes at multiple IP joints     Assessment/Plan:  77 y.o. male  With LRF trigger finger, hand arthritis      All questions were answered in detail. The patient  verbalized the understanding of the treatment plan and is in full agreement with the treatment plan.     The spectrum of treatment options were discussed with the patient, including nonoperative and operative options.  After thorough discussion, the patient has elected to undergo surgical treatment to include:     left ring finger A1 pulley release      We have discussed the surgery and anticipated recovery.  He understands that there may be limited mobility up to several weeks after surgery depending on procedures that are performed at the time of surgery.     The details of the surgical procedure were explained, including the location of probable incisions and a description of likely hardware and/or grafts to be used.  The patient understands the likely convalescence after surgery, in particular the expected postop rehab  and recovery course. Alternatives both operative and non-operative with associated risks and benefits discussed. The outlined risks and potential complications of the proposed procedure include but are not limited to: bleeding, infection, vessel and/or nerve damage, pain, numbness, tingling,  need for additional surgery, failure to return to pre-injury and/or preoperative functional status, inability to return to work, scar sensitivity, delayed healing, complex regional pain syndrome, weakness, pulley injury, tendon injury, bowstringing , partial and/or incomplete relief of symptoms, persistence of and/or worsening of symptoms, stiffness, decreased  strength, need for prolonged postoperative rehabilitation, deep venous thrombosis, pulmonary embolism, and death.  The patient states an understanding and wishes to proceed with surgery.   All questions were answered.  No guarantees were implied or stated.  Written informed consent was obtained.     He was also informed and understands the risks of surgery are greater for patients with a current condition or history of heart disease, obesity, clotting disorders, recurrent infections, steroid use, current or past smoking, and factors such as sedentary lifestyle and noncompliance with medications, therapy or follow-up. The degree of the increased risk is hard to estimate with any degree of precision.     All questions were answered. The patient has verbalized understanding of these issues and wishes to proceed as discussed.

## 2023-03-21 NOTE — PATIENT INSTRUCTIONS
Trigger Finger Release Postoperative Instructions  Gaby Martinez MD  Clinic phone number: [phone]    You may use the left hand for simple activities of daily living (writing, brushing teeth etc)  No strenuous activity or heavy lifting with left upper extremity   Start range of motion of fingers (open and close a fist) to prevent stiffness and help swelling resolve  Ice to the hand as needed -- 30 minutes on, 10 minutes off   Keep left upper extremity elevated above the heart as much as possible - this will help with swelling and keep pain under control  Keep dressing clean and dry.  You may remove dressing in 3 days and cover with a bandaid. Keep the incision clean, dry and covered until you are seen in clinic.  If the dressing gets wet please call clinic as soon as possible so the dressing can be changed.     You will be seen in clinic 2 weeks after your surgery.  The dressing will be removed and sutures will be taken out. After this visit you can advance the range of motion and use of your hand as tolerated with a 5 pound lifting restriction.   After 6 weeks you may return to full activity as tolerated     You may notice that the incision remains sensitive after it is healed.  This is normal and will improve over time.

## 2023-04-02 ENCOUNTER — PATIENT MESSAGE (OUTPATIENT)
Dept: ADMINISTRATIVE | Facility: OTHER | Age: 78
End: 2023-04-02
Payer: MEDICARE

## 2023-04-05 ENCOUNTER — OFFICE VISIT (OUTPATIENT)
Dept: ORTHOPEDICS | Facility: CLINIC | Age: 78
End: 2023-04-05
Payer: MEDICARE

## 2023-04-05 DIAGNOSIS — M65.342 TRIGGER FINGER, LEFT RING FINGER: Primary | ICD-10-CM

## 2023-04-05 PROCEDURE — 99213 OFFICE O/P EST LOW 20 MIN: CPT | Mod: PBBFAC,PN | Performed by: ORTHOPAEDIC SURGERY

## 2023-04-05 PROCEDURE — 99999 PR PBB SHADOW E&M-EST. PATIENT-LVL III: ICD-10-PCS | Mod: PBBFAC,,, | Performed by: ORTHOPAEDIC SURGERY

## 2023-04-05 PROCEDURE — 99999 PR PBB SHADOW E&M-EST. PATIENT-LVL III: CPT | Mod: PBBFAC,,, | Performed by: ORTHOPAEDIC SURGERY

## 2023-04-05 PROCEDURE — 99024 PR POST-OP FOLLOW-UP VISIT: ICD-10-PCS | Mod: POP,,, | Performed by: ORTHOPAEDIC SURGERY

## 2023-04-05 PROCEDURE — 99024 POSTOP FOLLOW-UP VISIT: CPT | Mod: POP,,, | Performed by: ORTHOPAEDIC SURGERY

## 2023-04-05 NOTE — PROGRESS NOTES
Postoperative Visit    History of Present Illness:   Johnathan Manning is 2  weeks s/p left Trigger finger release  (DOS-3/21/23)  He is doing well -- pain is well controlled   He is compliant with activity restrictions   Having some stiffness   Denies fevers, chills, constitutional symptoms     Physical Examination:    NAD  left upper extremity:  Incision over the hand is healing well with suture in place   No erythema, drainageor other signs of infection. Appropriate post operative swelling is present  Not able to make a tight fist     Assessment/Plan:  77 y.o. male  2  weeks s/p left Trigger finger release  (DOS-3/21/23)    Plan  Sutures were removed today and steri strips were placed   OT referral placed  Follow up in 4 weeks      All questions were answered in detail. The patient is in full agreement with the treatment plan and will proceed accordingly.

## 2023-04-10 ENCOUNTER — PATIENT MESSAGE (OUTPATIENT)
Dept: ORTHOPEDICS | Facility: CLINIC | Age: 78
End: 2023-04-10
Payer: MEDICARE

## 2023-04-11 ENCOUNTER — CLINICAL SUPPORT (OUTPATIENT)
Dept: REHABILITATION | Facility: HOSPITAL | Age: 78
End: 2023-04-11
Attending: ORTHOPAEDIC SURGERY
Payer: MEDICARE

## 2023-04-11 DIAGNOSIS — M79.642 PAIN IN LEFT HAND: ICD-10-CM

## 2023-04-11 DIAGNOSIS — M65.342 TRIGGER FINGER, LEFT RING FINGER: ICD-10-CM

## 2023-04-11 DIAGNOSIS — R29.898 DECREASED GRIP STRENGTH OF LEFT HAND: ICD-10-CM

## 2023-04-11 DIAGNOSIS — M25.642 FINGER STIFFNESS, LEFT: ICD-10-CM

## 2023-04-11 PROCEDURE — 97165 OT EVAL LOW COMPLEX 30 MIN: CPT | Mod: PN

## 2023-04-11 PROCEDURE — 97110 THERAPEUTIC EXERCISES: CPT | Mod: PN

## 2023-04-11 NOTE — PLAN OF CARE
[facility]  Occupational Therapy Initial Evaluation    Date: 4/11/2023  Name: Johnathan Waters  Clinic Number: [id number]    Therapy Diagnosis:   Encounter Diagnoses   Name Primary?    Trigger finger, left ring finger     Finger stiffness, left     Pain in left hand     Decreased  strength of left hand      Physician: Gaby Martinez MD    Physician Orders: Eval & Treat  Medical Diagnosis: M65.342 (ICD-10-CM) - Trigger finger, left ring finger   Surgical Procedure and Date: RELEASE, TRIGGER FINGER (Left), 3/21/2023  Evaluation Date: 4/11/2023  Insurance Authorization Period Expiration: 4/4/2024  Plan of Care Expiration: 7/11/2023  Progress Note Due: 5/9/2023   Date of Return to MD: 5/4/2023  Visit # / Visits authorized: 1 / 1  FOTO: 1/3    Precautions:  Standard, post-surgical (appears today 21 days post TF release), HTN     Time In: 1:00pm  Time Out: 1:36pm  Total Appointment Time (timed & untimed codes): 36 minutes    SUBJECTIVE     Date of Onset: 3/21/2023 surgery    History of Current Condition/Mechanism of Injury/Patient report: Johnathan reports: The hand feels very stiff in the morning, and my ring still does not fit. Prior to surgery, patient tried preventative care with cortisone injections and such.     Falls: n/a    Involved Side: Left  Dominant Side: Right  Imaging: n/a  Prior Therapy: none  Occupation:  Retired  Working presently: n/a    Functional Limitations/Social History:    Previous functional status includes: Independent with all ADLs.     Current Functional Status   Home/Living environment: lives with their family      Limitation of Functional Status as follows:   ADLs/IADLs:     - Can complete ADLs as long as <10#    - Unable to move furniture at home    - Began mowing the lawn again lately   Leisure: Spin classes (began again recently, wore cycle gloves)    Pain:  Functional Pain Scale Rating 0-10: Current 0/10, worst 3/10, best 0/10   Location: Scar    Description: Tight and sensitivity  Aggravating Factors: Pressure to incision  Easing Factors: rest, time, motion, stretching    Patient's Goals for Therapy: Get back to daily activities    Medical History:   Past Medical History:   Diagnosis Date    Elevated cholesterol     Hypertension     Squamous cell carcinoma     under right eye.  Sees outside Dermatology    Trigger ring finger of right hand 1/30/2019       Surgical History:    has a past surgical history that includes Vasectomy; Eye surgery; Colonoscopy (N/A, 9/22/2020); and Trigger finger release (Left, 3/21/2023).    Medications:   has a current medication list which includes the following prescription(s): hydrocodone-acetaminophen, lisinopril-hydrochlorothiazide, mometasone 0.1%, ondansetron, pravastatin, and tamsulosin.    Allergies:   Review of patient's allergies indicates:   Allergen Reactions    Wasp venom Itching and Swelling    Sulfa (sulfonamide antibiotics) Other (See Comments)     Patient unsure, toddler          OBJECTIVE     Observation/Appearance:  Scar dry and fully closed, slight scar build up proximally, a little tender to palpation    Circumferential Edema Measurements (in cm):   Right Left   Mid palm 22.3 21.3   MCP's 20.5 20.3     Finger Edema. Measured in centimeters.   4/11/2023 4/11/2023    Left Right   Ring:       P1            6.5 6.2    PIP            6 6.1   P2             5.4 5.3    DIP 5.3 5   P3 4.4 4.5     Hand ROM. Measured in degrees.   4/11/2023 4/11/2023    Left Right   Ring:   MP 0/77 0/87              PIP 4/85 0/90              DIP 0/74 0/73              BERNSTEIN 232 250     Sensation: WNL      baseline: defer     Limitation/Restriction for FOTO Hand Survey    Therapist reviewed FOTO scores for Johnathan Waters on 4/11/2023.   FOTO documents entered into EPIC - see Media section.    Limitation Score: 35%         Treatment     Total Treatment time (time-based codes) separate from Evaluation: 15 minutes    Johnathan  received the treatments listed below:     Therapeutic exercises to develop ROM for 15 minutes, including:  - Paraffin wax to warm up  - IP joint blocking x10ea  - Tendon glide positions x10ea  - Composite fist x10  - Finger lifts x10      Patient Education and Home Exercises      Education provided:   - Heat as a modality  - Scar massage & HEP  - Plan of care  - Goals    Written Home Exercises Provided: yes.  Exercises were reviewed and Johnathan was able to demonstrate them prior to the end of the session.  Johnathan demonstrated good  understanding of the education provided. See EMR under Patient Instructions for exercises provided during therapy sessions.     Pt was advised to perform these exercises free of pain, and to stop performing them if pain occurs.    Patient/Family Education: role of OT, goals for OT, scheduling/cancellations - pt verbalized understanding. Discussed insurance limitations with patient.    ASSESSMENT     Johnathan Waters is a 77 y.o. male referred to outpatient occupational therapy and presents with a medical diagnosis of M65.342 (ICD-10-CM) - Trigger finger, left ring finger .  Patient presents with the following therapy deficits: Decreased ROM, Decreased  strength, Decreased pinch strength, Decreased muscle strength, Decreased functional hand use, Increased pain, Edema, and Scar Adhesions and demonstrates limitations as described in the chart below. Following medical record review it is determined that pt will benefit from occupational therapy services in order to maximize pain free and/or functional use of left hand. The following goals were discussed with the patient and patient is in agreement with them as to be addressed in the treatment plan. The patient's rehab potential is Excellent.     Anticipated barriers to occupational therapy: none  Pt has no cultural, educational or language barriers to learning provided.    Profile and History Assessment of Occupational Performance  Level of Clinical Decision Making Complexity Score   Occupational Profile:   Johnathan Waters is a 77 y.o. male who lives with their family and is retired Johnathan Waters has difficulty with  ADLs and IADLs as listed previously, which  Affecting their daily functional abilities.      Comorbidities:    has a past medical history of Elevated cholesterol, Hypertension, Squamous cell carcinoma, and Trigger ring finger of right hand.    Medical and Therapy History Review:   Brief     Performance Deficits    Physical:  Joint Mobility  Muscle Power/Strength  Muscle Endurance  Skin Integrity/Scar Formation  Edema   Strength  Pinch Strength  Fine Motor Coordination    Cognitive:  No Deficits    Psychosocial:    No Deficits     Clinical Decision Making:  low    Assessment Process:  Problem-Focused Assessments    Modification/Need for Assistance:  Not Necessary    Intervention Selection:  Limited Treatment Options       low  Based on PMHX, co morbidities , data from assessments and functional level of assistance required with task and clinical presentation directly impacting function.       The following goals were discussed with the patient and patient is in agreement with them as to be addressed in the treatment plan.     Goals:     Short Term Goals (STGs); to be met within 4 weeks (5/9/2023).  1)  Patient to be IND with HEP and modalities for pain management.  2)  Increase ROM by 9 degrees to increase functional hand use for ADLs/work/leisure activities.  3)   Decrease edema by .3cm to increase joint mobility /flexibility for functional hand use.   4)  Patient will report a pain level of 0 out of 10 during HEP.   5)  Assess  and pinch and set goals accordingly.    Long Term Goals :To be met by discharge.  1) Patient will report a pain level of 0 out of 10 during daily activities.  2) Increase  strength by TBD lbs.   3) Increase ROM by 18 degrees to increase functional hand use for ADLs/work/leisure  activities.  4) Patient will demo improved FOTO score by 20 points.  5) Pt will return to prior level of function for ADLs, IADLs, and household management.      PLAN   Plan of Care Certification: 4/11/2023 to 7/11/2023.     Outpatient Occupational Therapy 1 times weekly for 6 weeks to include the following interventions: Paraffin, Fluidotherapy, Manual therapy/joint mobilizations, Modalities for pain management, US 3 mhz, Therapeutic exercises/activities., Strengthening, Edema Control, and Scar Management.      JORJE Chou, LEYDA, OTR/L      I CERTIFY THE NEED FOR THESE SERVICES FURNISHED UNDER THIS PLAN OF TREATMENT AND WHILE UNDER MY CARE  Physician's comments:      Physician's Signature: ___________________________________________________

## 2023-04-11 NOTE — PATIENT INSTRUCTIONS
"[facility] - OCCUPATIONAL THERAPY  HOME EXERCISE PROGRAM     Complete the following massages for 10 minutes each, 2x/day.                       Complete the following exercises with 25 repetitions each, 3x/day.     AROM: DIP Flexion / Extension  Pinch middle knuckle to prevent bending. Bend end knuckle until stretch is felt.   Hold 3 seconds. Relax. Straighten finger as far as possible.    AROM: PIP Flexion / Extension  Pinch bottom knuckle  to prevent bending. Actively bend middle knuckle until stretch is felt.   Hold 3 seconds. Relax. Straighten finger as far as possible.    AROM: Isolated PIP Flexion  Bend only middle joint of your finger, keeping other fingers straight with other hand.    AROM: Isolated MCP Flexion / Extension ("Wave")   Bend only your large, bottom knuckles. Hold 3 seconds. Keep the tips of your fingers straight. Straighten fingers.    AROM: Isolated IPJ Flexion / Extension ("Hook")  Bend only your middle and end knuckles. Hold 3 seconds.   Straighten your fingers.     AROM: MCP and PIP Flexion / Extension ("Straight Fist")  Bend your bottom and middle knuckles, keeping the tips of your fingers straight. Try to touch the pads of your fingers on your palm. Hold 3 seconds. Straighten your fingers.     AROM: Composite Flexion / Extension ("Full Fist")  Bend every joint in your hand into a fist. Hold 3 seconds. Straighten your fingers.     AROM: Composte Extension ("Finger Lifts")  Lift your finger off of the table one at a time. Hold 3 seconds. Relax your finger.      Copyright © I. All rights reserved.     Therapist: JORJE Chou, OTMIKAYLA, OTR/L   "

## 2023-04-17 NOTE — PROGRESS NOTES
"  [facility]  Occupational Therapy Daily Treatment Note    Date: 4/18/2023  Name: Johnathan Manning [place]  Clinic Number: [id number]    Therapy Diagnosis:   Encounter Diagnoses   Name Primary?    Trigger finger, left ring finger Yes    Finger stiffness, left     Pain in left hand     Decreased  strength of left hand      Physician: Gaby Martinez MD    Physician Orders: Eval and treat   Medical Diagnosis: M65.342 (ICD-10-CM) - Trigger finger, left ring finger  Surgical Procedure and Date:  RELEASE, TRIGGER FINGER (Left), 3/21/2023  Evaluation Date: 4/11/23  Insurance Authorization Period Expiration: 12/31/23; 6 visits on POC   Plan of Care Expiration: 4/11/23-7/11/23  Progress Note Due: 5/11/23   Date of Return to MD: 5/4/23  Visit # / Visits authorized: 1/20   FOTO: initial evaluation     Precautions:  Standard  Orthopedic Precautions: Pt is currently 4 weeks post-op     Time In: 07:45  Time Out: 08:30  Total Billable Time: 45 minutes      SUBJECTIVE     Pt reports: "I'm just still pretty stiff in the mornings. I want to be able to wear by wedding ring again."     He was compliant with home exercise program given last session.     Response to previous treatment: favorable     Functional change: N/A    Pain: 0/10  Location: left ring finger and volar palm    OBJECTIVE   Objective Measures updated at progress report unless specified.      Treatment     Johnathan received the following treatments listed below:     Supervised modalities after being cleared for contradictions for 6 minutes:   -Paraffin bath to L hand for 7 minutes to increase blood flow, circulation, pain management and for tissue elasticity prior to therex.     Direct contact modalities after being cleared for contraindications for 8 minutes:  -Utrasound for scar tissue remodeling, increased circulation, & tissue elasticity @ 100% duty cycle, 3.3 Mhz, applied to L volar palm/ring finger, intensity = 0.6 w/cm2       Manual " therapy techniques to increase joint mobilization and soft tissue mobilization for 8 minutes:   -Scar massage to L volar palm for 8 minutes with mini vibrator to decrease dense adhesions and improve tensile glide      Therapeutic activities/exercises to restore functional performance while increasing strength, endurance, ROM, and flexibility for 23 minutes, including:  -Fluidotherapy to L hand for 8 minutes to increase blood flow, circulation, desensitization, sensory re-education and for pain management. Performed the following therex within fluidotherapy:  -Gentle  strengthening w/ moderate resistance foam ball   - Education on splinting protocol for conservative management of trigger finger (R hand)   - Review of all previously provided HEP        Patient Education and Home Exercises      Education provided:   - Importance of compliance w/ HEP to maximize functional gains   - Progress towards goals     Written Home Exercises Provided:  Continue w/ previous HEP .  Exercises were reviewed and Johnathan was able to demonstrate them prior to the end of the session.  Johnathan demonstrated good  understanding of the HEP provided. See EMR under Patient Instructions for exercises provided during previous therapy sessions:     ASSESSMENT     Pt tolerated session well, noting good understanding of all education provided. Pt reports continued AM ring finger stiffness.       Johnathan is progressing well towards his goals and there are no updates to goals at this time. Pt prognosis is Good.     Pt will continue to benefit from skilled Outpatient Occupational Therapy to address the deficits listed in the problem list on initial evaluation, provide Pt/family education and to maximize Pt's level of independence in the home and community environment.     Pt's spiritual, cultural and educational needs considered and Pt agreeable to Plan of Care and goals.    Anticipated barriers to occupational therapy: None noted        Short  Term Goals (STGs); to be met within 4 weeks (5/9/2023).  1)  Patient to be IND with HEP and modalities for pain management.  2)  Increase ROM by 9 degrees to increase functional hand use for ADLs/work/leisure activities.  3)   Decrease edema by .3cm to increase joint mobility /flexibility for functional hand use.   4)  Patient will report a pain level of 0 out of 10 during HEP.   5)  Assess  and pinch and set goals accordingly.     Long Term Goals :To be met by discharge.  1) Patient will report a pain level of 0 out of 10 during daily activities.  2) Increase  strength by TBD lbs.   3) Increase ROM by 18 degrees to increase functional hand use for ADLs/work/leisure activities.  4) Patient will demo improved FOTO score by 20 points.  5) Pt will return to prior level of function for ADLs, IADLs, and household management.       PLAN     Continue Skilled Occupational Therapy with individualized Plan of Care to address the above outlined treatment goals for 1 x week for 6 weeks during the certification period of 4/11/23 through 7/11/23.       Updates/Grading for next session: TBD pending progress       ELIAN Meyers

## 2023-04-18 ENCOUNTER — CLINICAL SUPPORT (OUTPATIENT)
Dept: REHABILITATION | Facility: HOSPITAL | Age: 78
End: 2023-04-18
Attending: ORTHOPAEDIC SURGERY
Payer: MEDICARE

## 2023-04-18 DIAGNOSIS — M25.642 FINGER STIFFNESS, LEFT: ICD-10-CM

## 2023-04-18 DIAGNOSIS — M65.342 TRIGGER FINGER, LEFT RING FINGER: Primary | ICD-10-CM

## 2023-04-18 DIAGNOSIS — R29.898 DECREASED GRIP STRENGTH OF LEFT HAND: ICD-10-CM

## 2023-04-18 DIAGNOSIS — M79.642 PAIN IN LEFT HAND: ICD-10-CM

## 2023-04-18 PROCEDURE — 97022 WHIRLPOOL THERAPY: CPT | Mod: 59,PN

## 2023-04-18 PROCEDURE — 97018 PARAFFIN BATH THERAPY: CPT | Mod: PN

## 2023-04-18 PROCEDURE — 97530 THERAPEUTIC ACTIVITIES: CPT | Mod: PN

## 2023-04-18 PROCEDURE — 97140 MANUAL THERAPY 1/> REGIONS: CPT | Mod: PN

## 2023-04-18 PROCEDURE — 97035 APP MDLTY 1+ULTRASOUND EA 15: CPT | Mod: PN

## 2023-04-19 ENCOUNTER — TELEPHONE (OUTPATIENT)
Dept: FAMILY MEDICINE | Facility: CLINIC | Age: 78
End: 2023-04-19
Payer: MEDICARE

## 2023-04-19 NOTE — TELEPHONE ENCOUNTER
----- Message from Khushi German sent at 4/19/2023 12:38 PM CDT -----  Type: Patient Call Back    Who called:  Dr Parker office     What is the request in detail: requesting to have a call back concerning patient     Can the clinic reply by [facility]? no    Would the patient rather a call back or a response via [facility]?  call    Best call back number: [phone]    Additional Information:

## 2023-04-20 NOTE — TELEPHONE ENCOUNTER
Below information given to patient, verbalized   understanding scheduled appointment 04/25 at 1:00 pm.

## 2023-04-20 NOTE — TELEPHONE ENCOUNTER
Please inform the patient that I have spoken to his eye doctor Dr. Parker.      Please schedule him a follow up with me.  Virtual is ok as we mostly need to talk about next steps.     Sincerely,  Jay

## 2023-04-21 ENCOUNTER — PES CALL (OUTPATIENT)
Dept: ADMINISTRATIVE | Facility: CLINIC | Age: 78
End: 2023-04-21
Payer: MEDICARE

## 2023-04-25 ENCOUNTER — OFFICE VISIT (OUTPATIENT)
Dept: FAMILY MEDICINE | Facility: CLINIC | Age: 78
End: 2023-04-25
Payer: MEDICARE

## 2023-04-25 ENCOUNTER — PATIENT MESSAGE (OUTPATIENT)
Dept: FAMILY MEDICINE | Facility: CLINIC | Age: 78
End: 2023-04-25

## 2023-04-25 ENCOUNTER — LAB VISIT (OUTPATIENT)
Dept: LAB | Facility: HOSPITAL | Age: 78
End: 2023-04-25
Attending: INTERNAL MEDICINE
Payer: MEDICARE

## 2023-04-25 DIAGNOSIS — G45.3 AMAUROSIS FUGAX OF RIGHT EYE: Primary | ICD-10-CM

## 2023-04-25 DIAGNOSIS — I10 ESSENTIAL HYPERTENSION: Chronic | ICD-10-CM

## 2023-04-25 DIAGNOSIS — G45.3 AMAUROSIS FUGAX OF RIGHT EYE: ICD-10-CM

## 2023-04-25 LAB
APTT PPP: 28.4 SEC (ref 21–32)
BASOPHILS # BLD AUTO: 0.07 K/UL (ref 0–0.2)
BASOPHILS NFR BLD: 0.9 % (ref 0–1.9)
DIFFERENTIAL METHOD: ABNORMAL
EOSINOPHIL # BLD AUTO: 0.2 K/UL (ref 0–0.5)
EOSINOPHIL NFR BLD: 2.5 % (ref 0–8)
ERYTHROCYTE [DISTWIDTH] IN BLOOD BY AUTOMATED COUNT: 13.6 % (ref 11.5–14.5)
ERYTHROCYTE [SEDIMENTATION RATE] IN BLOOD BY PHOTOMETRIC METHOD: <2 MM/HR (ref 0–23)
HCT VFR BLD AUTO: 42.9 % (ref 40–54)
HGB BLD-MCNC: 13.5 G/DL (ref 14–18)
IMM GRANULOCYTES # BLD AUTO: 0.03 K/UL (ref 0–0.04)
IMM GRANULOCYTES NFR BLD AUTO: 0.4 % (ref 0–0.5)
INR PPP: 1 (ref 0.8–1.2)
LYMPHOCYTES # BLD AUTO: 1.5 K/UL (ref 1–4.8)
LYMPHOCYTES NFR BLD: 19.6 % (ref 18–48)
MCH RBC QN AUTO: 28.1 PG (ref 27–31)
MCHC RBC AUTO-ENTMCNC: 31.5 G/DL (ref 32–36)
MCV RBC AUTO: 89 FL (ref 82–98)
MONOCYTES # BLD AUTO: 0.8 K/UL (ref 0.3–1)
MONOCYTES NFR BLD: 11.1 % (ref 4–15)
NEUTROPHILS # BLD AUTO: 4.9 K/UL (ref 1.8–7.7)
NEUTROPHILS NFR BLD: 65.5 % (ref 38–73)
NRBC BLD-RTO: 0 /100 WBC
PLATELET # BLD AUTO: 305 K/UL (ref 150–450)
PMV BLD AUTO: 12.2 FL (ref 9.2–12.9)
PROTHROMBIN TIME: 10.9 SEC (ref 9–12.5)
RBC # BLD AUTO: 4.81 M/UL (ref 4.6–6.2)
WBC # BLD AUTO: 7.51 K/UL (ref 3.9–12.7)

## 2023-04-25 PROCEDURE — 99214 PR OFFICE/OUTPT VISIT, EST, LEVL IV, 30-39 MIN: ICD-10-PCS | Mod: 95,,, | Performed by: INTERNAL MEDICINE

## 2023-04-25 PROCEDURE — 85610 PROTHROMBIN TIME: CPT | Performed by: INTERNAL MEDICINE

## 2023-04-25 PROCEDURE — 85652 RBC SED RATE AUTOMATED: CPT | Performed by: INTERNAL MEDICINE

## 2023-04-25 PROCEDURE — 85025 COMPLETE CBC W/AUTO DIFF WBC: CPT | Performed by: INTERNAL MEDICINE

## 2023-04-25 PROCEDURE — 36415 COLL VENOUS BLD VENIPUNCTURE: CPT | Mod: PO | Performed by: INTERNAL MEDICINE

## 2023-04-25 PROCEDURE — 99214 OFFICE O/P EST MOD 30 MIN: CPT | Mod: 95,,, | Performed by: INTERNAL MEDICINE

## 2023-04-25 PROCEDURE — 85730 THROMBOPLASTIN TIME PARTIAL: CPT | Performed by: INTERNAL MEDICINE

## 2023-04-25 NOTE — ASSESSMENT & PLAN NOTE
Enrolled in digital medicine with excellent control.  The current medical regimen is effective;  continue present plan and medications.

## 2023-04-25 NOTE — PROGRESS NOTES
"  [facility]  Occupational Therapy Daily Treatment Note    Date: 4/27/2023  Name: Johnathan Waters  Clinic Number: [id number]    Therapy Diagnosis:   Encounter Diagnoses   Name Primary?    Trigger finger, left ring finger Yes    Finger stiffness, left     Pain in left hand     Decreased  strength of left hand      Physician: Gaby Martinez MD    Physician Orders: Eval and treat   Medical Diagnosis: M65.342 (ICD-10-CM) - Trigger finger, left ring finger  Surgical Procedure and Date:  RELEASE, TRIGGER FINGER (Left), 3/21/2023  Evaluation Date: 4/11/23  Insurance Authorization Period Expiration: 12/31/23; 6 visits on POC   Plan of Care Expiration: 4/11/23-7/11/23  Progress Note Due: 5/11/23   Date of Return to MD: 5/4/23  Visit # / Visits authorized: 2/20   FOTO: initial evaluation     Precautions:  Standard  Orthopedic Precautions: Pt is currently 5 weeks post-op     Time In: 12:20  Time Out: 13:00  Total Billable Time: 40 minutes      SUBJECTIVE     Pt reports: "I'm still stiff in the mornings- it resolves in about 20 minutes or so."     He was compliant with home exercise program given last session.     Response to previous treatment: favorable     Functional change: N/A    Pain: 0/10  Location: left ring finger and volar palm    OBJECTIVE   Objective Measures updated at progress report unless specified.      Treatment     Johnathan received the following treatments listed below:     Supervised modalities after being cleared for contradictions for 7 minutes:   -Paraffin bath to L hand for to increase blood flow, circulation, pain management and for tissue elasticity prior to therex.     Direct contact modalities after being cleared for contraindications for 8 minutes:  -Utrasound for scar tissue remodeling, increased circulation, & tissue elasticity @ 100% duty cycle, 3.3 Mhz, applied to L volar palm/ring finger, intensity = 0.6 w/cm2       Therapeutic activities/exercises to " restore functional performance while increasing strength, endurance, ROM, and flexibility for 30 minutes, including:  - PHG for hook  (50#) forearm, neutral and pronated x 5 min each   - Review of education on splinting protocol for conservative management of trigger finger (R hand)   - Review of all previously provided HEP x 15 minutes     Patient Education and Home Exercises      Education provided:   - Importance of compliance w/ HEP to maximize functional gains   - Progress towards goals     Written Home Exercises Provided:  Continue w/ previous HEP . Red Theraputty provided.   Exercises were reviewed and Johnathan was able to demonstrate them prior to the end of the session.  Johnathan demonstrated good  understanding of the HEP provided. See EMR under Patient Instructions for exercises provided during previous therapy sessions and today's session .     ASSESSMENT     Pt tolerated session well, noting no episodes of L ring finger triggering reported w/ hook  strengthening exercises today. Will assess baseline /pinch next session, and likely DC to home program pending his continued progress.      Johnathan is progressing well towards his goals and there are no updates to goals at this time. Pt prognosis is Good.     Pt will continue to benefit from skilled Outpatient Occupational Therapy to address the deficits listed in the problem list on initial evaluation, provide Pt/family education and to maximize Pt's level of independence in the home and community environment.     Pt's spiritual, cultural and educational needs considered and Pt agreeable to Plan of Care and goals.    Anticipated barriers to occupational therapy: None noted        Short Term Goals (STGs); to be met within 4 weeks (5/9/2023).  1)  Patient to be IND with HEP and modalities for pain management.  2)  Increase ROM by 9 degrees to increase functional hand use for ADLs/work/leisure activities.  3)  Decrease edema by .3 cm to increase joint  mobility /flexibility for functional hand use.   4)  Patient will report a pain level of 0 out of 10 during HEP.   5)  Assess  and pinch and set goals accordingly.     Long Term Goals :To be met by discharge.  1) Patient will report a pain level of 0 out of 10 during daily activities.  2) Increase  strength by TBD lbs.   3) Increase ROM by 18 degrees to increase functional hand use for ADLs/work/leisure activities.  4) Patient will demo improved FOTO score by 20 points.  5) Pt will return to prior level of function for ADLs, IADLs, and household management.       PLAN     Continue Skilled Occupational Therapy with individualized Plan of Care to address the above outlined treatment goals for 1 x week for 6 weeks during the certification period of 4/11/23 through 7/11/23.       Updates/Grading for next session: TBD pending progress; Re-assessment        ELIAN Meyers

## 2023-04-25 NOTE — PROGRESS NOTES
Assessment & Plan  Problem List Items Addressed This Visit          Cardiac/Vascular    Essential hypertension (Chronic)    Current Assessment & Plan     Enrolled in digital medicine with excellent control.  The current medical regimen is effective;  continue present plan and medications.             Other Visit Diagnoses       Amaurosis fugax of right eye    -  Primary  -    Check labs and US.  If all normal will move on to cardiology referral for stress testing.  Discusse layneikely recommendation forthcoming from me for high potency statin and baby ASA.  Will await initial testing to start.     Relevant Orders    CBC Auto Differential    Sedimentation rate    APTT    Protime-INR    CV Ultrasound Bilateral Doppler Carotid              Health Maintenance reviewed, up to date.    The patient location is:  Patient home   The chief complaint leading to consultation is: noted below  Visit type: Virtual visit with synchronous audio and video  Total time spent with patient: 23  Each patient to whom he or she provides medical services by telemedicine is:  (1) informed of the relationship between the physician and patient and the respective role of any other health care provider with respect to management of the patient; and (2) notified that he or she may decline to receive medical services by telemedicine and may withdraw from such care at any time.    Follow-up: Follow up if symptoms worsen or fail to improve.    ______________________________________________________________________    Chief Complaint  Chief Complaint   Patient presents with    Loss of Vision       HPI  Johnathan Waters is a 77 y.o. male with multiple medical diagnoses as listed in the medical history and problem list that presents for loss vision via virtual visit.  Their last appointment Visit date not found.      Had a sudden loss of right sided inferior vision.  Previously discussed with outside ophthalmologist last Friday. Resolved now.      Taking  and tolerating medications as prescribed without perceived side effects.  No CP, SOB, palpitations        ALLERGIES AND MEDICATIONS: updated and reviewed.  Review of patient's allergies indicates:   Allergen Reactions    Wasp venom Itching and Swelling    Sulfa (sulfonamide antibiotics) Other (See Comments)     Patient unsure, toddler     Current Outpatient Medications   Medication Sig Dispense Refill    HYDROcodone-acetaminophen (NORCO) 5-325 mg per tablet Take 1 tablet by mouth every 6 (six) hours as needed for Pain. 15 tablet 0    lisinopriL-hydrochlorothiazide (PRINZIDE,ZESTORETIC) 10-12.5 mg per tablet Take 1 tablet by mouth once daily. 90 tablet 3    mometasone 0.1% (ELOCON) 0.1 % cream Apply topically daily as needed (rash). 50 g 2    ondansetron (ZOFRAN) 4 MG tablet Take 1 tablet (4 mg total) by mouth every 8 (eight) hours as needed for Nausea. 10 tablet 0    pravastatin (PRAVACHOL) 20 MG tablet Take 1 tablet (20 mg total) by mouth once daily. 90 tablet 3    tamsulosin (FLOMAX) 0.4 mg Cap Take 1 capsule (0.4 mg total) by mouth once daily. 90 capsule 3     No current facility-administered medications for this visit.         ROS  Review of Systems   Constitutional:  Negative for activity change and unexpected weight change.   HENT:  Negative for hearing loss, rhinorrhea and trouble swallowing.    Eyes:  Positive for visual disturbance. Negative for discharge.   Respiratory:  Negative for chest tightness and wheezing.    Cardiovascular:  Negative for chest pain and palpitations.   Gastrointestinal:  Negative for blood in stool, constipation, diarrhea and vomiting.   Endocrine: Negative for polydipsia and polyuria.   Genitourinary:  Negative for difficulty urinating, hematuria and urgency.   Musculoskeletal:  Negative for arthralgias, joint swelling and neck pain.   Neurological:  Negative for weakness and headaches.   Psychiatric/Behavioral:  Negative for confusion and dysphoric mood.          Physical  Exam  Physical Exam  Constitutional:       General: He is not in acute distress.     Appearance: Normal appearance. He is well-developed.   HENT:      Head: Normocephalic and atraumatic.   Eyes:      Extraocular Movements: Extraocular movements intact.      Conjunctiva/sclera: Conjunctivae normal.   Pulmonary:      Effort: Pulmonary effort is normal. No respiratory distress.   Musculoskeletal:      Cervical back: Normal range of motion.   Neurological:      General: No focal deficit present.      Mental Status: He is alert and oriented to person, place, and time.   Psychiatric:         Mood and Affect: Mood and affect normal.         Behavior: Behavior normal.         Thought Content: Thought content normal.         Judgment: Judgment normal.

## 2023-04-26 NOTE — PROGRESS NOTES
Your labs results are looking good.  Let's proceed with the next steps of US of the neck as we discussed.    Sincerely,  Bernard Davalos MD

## 2023-04-27 ENCOUNTER — CLINICAL SUPPORT (OUTPATIENT)
Dept: REHABILITATION | Facility: HOSPITAL | Age: 78
End: 2023-04-27
Attending: ORTHOPAEDIC SURGERY
Payer: MEDICARE

## 2023-04-27 DIAGNOSIS — M25.642 FINGER STIFFNESS, LEFT: ICD-10-CM

## 2023-04-27 DIAGNOSIS — R29.898 DECREASED GRIP STRENGTH OF LEFT HAND: ICD-10-CM

## 2023-04-27 DIAGNOSIS — M79.642 PAIN IN LEFT HAND: ICD-10-CM

## 2023-04-27 DIAGNOSIS — M65.342 TRIGGER FINGER, LEFT RING FINGER: Primary | ICD-10-CM

## 2023-04-27 PROCEDURE — 97530 THERAPEUTIC ACTIVITIES: CPT | Mod: PN

## 2023-04-27 PROCEDURE — 97018 PARAFFIN BATH THERAPY: CPT | Mod: PN

## 2023-04-27 PROCEDURE — 97035 APP MDLTY 1+ULTRASOUND EA 15: CPT | Mod: PN

## 2023-05-01 NOTE — PROGRESS NOTES
"  [facility]  Occupational Therapy Progress Note/Discharge Summary    Date: 5/4/2023  Name: Johnatahn Waters  Clinic Number: [id number]    Therapy Diagnosis:   Encounter Diagnoses   Name Primary?    Trigger finger, left ring finger Yes    Finger stiffness, left     Pain in left hand     Decreased  strength of left hand      Physician: Gaby Martinez MD    Physician Orders: Eval and treat   Medical Diagnosis: M65.342 (ICD-10-CM) - Trigger finger, left ring finger  Surgical Procedure and Date:  RELEASE, TRIGGER FINGER (Left), 3/21/2023  Evaluation Date: 4/11/23  Insurance Authorization Period Expiration: 12/31/23; 6 visits on POC   Plan of Care Expiration: 4/11/23-7/11/23  Progress Note Due: 5/11/23   Date of Return to MD: 5/4/23  Visit # / Visits authorized: 3/20   FOTO: initial evaluation, 4th visit          Precautions:  Standard  Orthopedic Precautions: Pt is currently 6 weeks post-op     Time In: 16:05  Time Out: 16:45  Total Billable Time: 45 minutes      SUBJECTIVE     Pt reports: "I still have stiffness in the morning. I still can't get my ring on. No more triggering. No pain at the scar site."     He was compliant with home exercise program given last session.     Response to previous treatment: favorable     Functional change: N/A    Pain: 0/10  Location: left ring finger and volar palm    OBJECTIVE   Objective Measures updated at progress report unless specified.      Circumferential Edema Measurements (in cm):    Right Left   Mid palm 22.3 21.3   MCP's 20.5  20.0 (-0.3 cm)       Finger Edema. Measured in centimeters.    4/11/2023 4/11/2023 5/4/23     Left Right Left    Ring:                 P1            6.5 6.2 6.5            PIP            6 6.1 6             P2             5.4 5.3 5    DIP 5.3 5 5   P3 4.4 4.5 4.4      Hand ROM. Measured in degrees.    4/11/2023 4/11/2023 5/4/23     Left Right Left    Ring:   MP 0/77 0/87 0/85              PIP 4/85 0/90 0/100        "       DIP 0/74 0/73 0/85              BERNSTEIN 232 250 270       Strength: (DIONNA Dynamometer in lbs), Average 3 trials, Position II  Right: 77.3 lbs  Left: 62.6 lbs      Pinch Strength: (Pinch Gauge in lbs), Average 3 trials  Lateral/Kimball Pinch       L) 20      R) 25  3pt Pinch                    L) 16      R) 16  2pt Pinch                    L) 15      R) 16      Limitation/Restriction for FOTO Hand Survey     Therapist reviewed FOTO scores for Johnathan Waters on 5/4/2023.   FOTO documents entered into [facility] - see Media section.     Limitation Score: 19% (16% improvement)          Treatment     Johnathan received the following treatments listed below:     Re-Assessment performed w/ measurements and report taken x 30 minutes     Therapeutic activities to restore functional performance while increasing strength, endurance, ROM, and flexibility for 15 minutes, including:  - Review of education on splinting protocol for conservative management of trigger finger (R hand)   - Review of all previously provided HEP     Patient Education and Home Exercises      Education provided:   - Importance of compliance w/ HEP to maximize functional gains   - Progress towards goals     Written Home Exercises Provided:  Continue w/ previous HEP . Focus on edema management of the proximal phalanx with combination of [place]   Exercises were reviewed and Johnathan was able to demonstrate them prior to the end of the session.  Johnathan demonstrated good  understanding of the HEP provided. See EMR under Patient Instructions for exercises provided during previous therapy sessions and today's session .     ASSESSMENT     Pt has nearly met all long term goals established for therapy, noting normative digit ROM, composite , and isolated pinch strength demonstrated prior to expected date, as well as no reports of functional limitations in regards to his L hand w/ daily tasks. Pt continues to demonstrate localized and variable P1 edema of the L  ring finger, and is able to manage this at home per HEP provided.     Pt's spiritual, cultural and educational needs considered and Pt agreeable to Plan of Care and goals.       Short Term Goals (STGs); to be met within 4 weeks (5/9/2023).  1)  Patient to be IND with HEP and modalities for pain management. Goal Met 5/4  2)  Increase ROM by 9 degrees to increase functional hand use for ADLs/work/leisure activities. Goal Met 5/4  3)  Decrease edema by .3 cm to increase joint mobility /flexibility for functional hand use.  Not Met   4)  Patient will report a pain level of 0 out of 10 during HEP. Goal Met 5/4  5)  Assess  and pinch and set goals accordingly. Goal Met 5/4     Updated Long Term Goals :To be met by discharge.  1) Patient will report a pain level of 0 out of 10 during daily activities. Goal Met 5/4  2) Increase  strength by TBD lbs. N/A 5/4  3) Increase ROM by 18 degrees to increase functional hand use for ADLs/work/leisure activities.         Goal Met 5/4  4) Patient will demo improved FOTO score by 15-20 points. Goal Met 5/4  5) Pt will return to prior level of function for ADLs, IADLs, and household management. Goal Met       5/4     PLAN     Patient is appropriate for discharge from Outpatient Occupational Therapy at this time and will continue at home with HEP.     ELIAN Meyers

## 2023-05-04 ENCOUNTER — PATIENT MESSAGE (OUTPATIENT)
Dept: ORTHOPEDICS | Facility: CLINIC | Age: 78
End: 2023-05-04

## 2023-05-04 ENCOUNTER — OFFICE VISIT (OUTPATIENT)
Dept: ORTHOPEDICS | Facility: CLINIC | Age: 78
End: 2023-05-04
Payer: MEDICARE

## 2023-05-04 ENCOUNTER — CLINICAL SUPPORT (OUTPATIENT)
Dept: REHABILITATION | Facility: HOSPITAL | Age: 78
End: 2023-05-04
Attending: ORTHOPAEDIC SURGERY
Payer: MEDICARE

## 2023-05-04 DIAGNOSIS — M65.342 TRIGGER FINGER, LEFT RING FINGER: Primary | ICD-10-CM

## 2023-05-04 DIAGNOSIS — R29.898 DECREASED GRIP STRENGTH OF LEFT HAND: ICD-10-CM

## 2023-05-04 DIAGNOSIS — M79.642 PAIN IN LEFT HAND: ICD-10-CM

## 2023-05-04 DIAGNOSIS — M25.642 FINGER STIFFNESS, LEFT: ICD-10-CM

## 2023-05-04 PROCEDURE — 99212 OFFICE O/P EST SF 10 MIN: CPT | Mod: PBBFAC,PN | Performed by: ORTHOPAEDIC SURGERY

## 2023-05-04 PROCEDURE — 99999 PR PBB SHADOW E&M-EST. PATIENT-LVL II: CPT | Mod: PBBFAC,,, | Performed by: ORTHOPAEDIC SURGERY

## 2023-05-04 PROCEDURE — 99024 POSTOP FOLLOW-UP VISIT: CPT | Mod: POP,,, | Performed by: ORTHOPAEDIC SURGERY

## 2023-05-04 PROCEDURE — 99999 PR PBB SHADOW E&M-EST. PATIENT-LVL II: ICD-10-PCS | Mod: PBBFAC,,, | Performed by: ORTHOPAEDIC SURGERY

## 2023-05-04 PROCEDURE — 99024 PR POST-OP FOLLOW-UP VISIT: ICD-10-PCS | Mod: POP,,, | Performed by: ORTHOPAEDIC SURGERY

## 2023-05-04 PROCEDURE — 97530 THERAPEUTIC ACTIVITIES: CPT | Mod: PN

## 2023-05-04 NOTE — PROGRESS NOTES
Postoperative Visit    History of Present Illness:   Johnathan Manning is 6 weeks s/p left Trigger finger release  (DOS-3/21/23)  He is doing well -- pain is well controlled   Stiffness improved with OT   Having recurrence of symptoms on R, therapist suggested splint    Physical Examination:    NAD  left upper extremity:  Incision over the hand is well healed, non tender   Able to make tight fist  No triggering    Assessment/Plan:  78 y.o. male  6 weeks s/p left Trigger finger release  (DOS-3/21/23)    Plan  Continue OT exercises  Splint OK   If triggering on R becomes painful will need to consider release  Follow up PRN    All questions were answered in detail. The patient is in full agreement with the treatment plan and will proceed accordingly.

## 2023-05-05 PROBLEM — M79.642 PAIN IN LEFT HAND: Status: RESOLVED | Noted: 2023-04-11 | Resolved: 2023-05-05

## 2023-05-05 PROBLEM — R29.898 DECREASED GRIP STRENGTH OF LEFT HAND: Status: RESOLVED | Noted: 2023-04-11 | Resolved: 2023-05-05

## 2023-05-05 PROBLEM — M25.642 FINGER STIFFNESS, LEFT: Status: RESOLVED | Noted: 2023-04-11 | Resolved: 2023-05-05

## 2023-05-18 ENCOUNTER — HOSPITAL ENCOUNTER (OUTPATIENT)
Dept: CARDIOLOGY | Facility: HOSPITAL | Age: 78
Discharge: HOME | End: 2023-05-18
Attending: INTERNAL MEDICINE
Payer: MEDICARE

## 2023-05-18 ENCOUNTER — OFFICE VISIT (OUTPATIENT)
Dept: URGENT CARE | Facility: CLINIC | Age: 78
End: 2023-05-18
Payer: MEDICARE

## 2023-05-18 VITALS
HEIGHT: 67 IN | SYSTOLIC BLOOD PRESSURE: 124 MMHG | RESPIRATION RATE: 14 BRPM | DIASTOLIC BLOOD PRESSURE: 72 MMHG | HEART RATE: 66 BPM | BODY MASS INDEX: 27.31 KG/M2 | TEMPERATURE: 97 F | OXYGEN SATURATION: 95 % | WEIGHT: 174 LBS

## 2023-05-18 DIAGNOSIS — T63.481A LOCAL REACTION TO INSECT STING, ACCIDENTAL OR UNINTENTIONAL, INITIAL ENCOUNTER: Primary | ICD-10-CM

## 2023-05-18 DIAGNOSIS — G45.3 AMAUROSIS FUGAX OF RIGHT EYE: ICD-10-CM

## 2023-05-18 PROCEDURE — 93880 CV US DOPPLER CAROTID (CUPID ONLY): ICD-10-PCS | Mod: 26,,, | Performed by: INTERNAL MEDICINE

## 2023-05-18 PROCEDURE — 96372 THER/PROPH/DIAG INJ SC/IM: CPT | Mod: S$GLB,,, | Performed by: NURSE PRACTITIONER

## 2023-05-18 PROCEDURE — 99213 PR OFFICE/OUTPT VISIT, EST, LEVL III, 20-29 MIN: ICD-10-PCS | Mod: 25,S$GLB,, | Performed by: NURSE PRACTITIONER

## 2023-05-18 PROCEDURE — 99213 OFFICE O/P EST LOW 20 MIN: CPT | Mod: 25,S$GLB,, | Performed by: NURSE PRACTITIONER

## 2023-05-18 PROCEDURE — 93880 EXTRACRANIAL BILAT STUDY: CPT

## 2023-05-18 PROCEDURE — 93880 EXTRACRANIAL BILAT STUDY: CPT | Mod: 26,,, | Performed by: INTERNAL MEDICINE

## 2023-05-18 PROCEDURE — 96372 PR INJECTION,THERAP/PROPH/DIAG2ST, IM OR SUBCUT: ICD-10-PCS | Mod: S$GLB,,, | Performed by: NURSE PRACTITIONER

## 2023-05-18 RX ORDER — METHYLPREDNISOLONE 4 MG/1
TABLET ORAL
Qty: 21 EACH | Refills: 0 | Status: SHIPPED | OUTPATIENT
Start: 2023-05-18 | End: 2023-06-08

## 2023-05-18 RX ORDER — CETIRIZINE HYDROCHLORIDE 10 MG/1
TABLET ORAL
COMMUNITY
Start: 2023-02-28

## 2023-05-18 RX ORDER — DEXAMETHASONE SODIUM PHOSPHATE 100 MG/10ML
10 INJECTION INTRAMUSCULAR; INTRAVENOUS
Status: COMPLETED | OUTPATIENT
Start: 2023-05-18 | End: 2023-05-18

## 2023-05-18 RX ADMIN — DEXAMETHASONE SODIUM PHOSPHATE 10 MG: 100 INJECTION INTRAMUSCULAR; INTRAVENOUS at 01:05

## 2023-05-18 NOTE — PATIENT INSTRUCTIONS
- Follow up with your PCP or specialty clinic as directed in the next 1-2 weeks if not improved or as needed.  You can call [phone] to schedule an appointment with the appropriate provider.    - Go to the ER or seek medical attention immediately if you develop new or worsening symptoms.    - You must understand that you have received an Urgent Care treatment only and that you may be released before all of your medical problems are known or treated.   - You, the patient, will arrange for follow up care as instructed.   - If your condition worsens or fails to improve we recommend that you receive another evaluation at the ER immediately or contact your PCP to discuss your concerns or return here.     Patient Instructions   Insect Bite with Local Reaction  Please return here or go to the Emergency Department for any concerns or worsening of condition.   Over the Counter Claritin or Zyrtec as directed daily for the next 3-5 days. You can take Benadryl at night if needed  Also take Pepcid daily as directed for the next 3-5 days.   Cool Compresses to the affected area.   Follow up with your PCP in the next 2-3 days if no improvement   If you develop an increase in redness, swelling, tenderness, drainage, fever, nausea/vomiting, trouble breathing, speaking or swallowing, ect., go to the ER.

## 2023-05-18 NOTE — PROGRESS NOTES
"Subjective:      Patient ID: Johnathan Waters is a 78 y.o. male.    Vitals:  height is 5' 7" (1.702 m) and weight is 78.9 kg (174 lb). His oral temperature is 97.4 °F (36.3 °C). His blood pressure is 124/72 and his pulse is 66. His respiration is 14 and oxygen saturation is 95%.     Chief Complaint: Insect Bite    78-year-old male presents to clinic for evaluation after wasp sting to right hand.  Patient states that he was stung by wasp just prior to arrival.  He does report an allergy to wasp venom.  He reports taking Zyrtec.  He denies shortness or breath, or any airway complaints.  He states that reactions in the past have led to severe swelling at site, and tenderness.  He is awake and alert, answers questions appropriately, no acute distress noted on today's visit.    Insect Bite  This is a new problem. The current episode started today. The problem occurs constantly. The problem has been gradually worsening. Pertinent negatives include no chest pain, chills, congestion, diaphoresis, fatigue, fever, nausea or vomiting. Associated symptoms comments: Swelling and warm. Treatments tried: zyrtec. The treatment provided mild relief.     Constitution: Negative for activity change, appetite change, chills, sweating, fatigue and fever.   HENT:  Negative for congestion and trouble swallowing.    Cardiovascular:  Negative for chest pain.   Respiratory:  Negative for shortness of breath.    Gastrointestinal:  Negative for nausea and vomiting.   Skin:  Positive for erythema.   Neurological:  Negative for dizziness.    Objective:     Physical Exam   Constitutional: He is oriented to person, place, and time. He appears well-developed.  Non-toxic appearance. He does not appear ill. No distress.   HENT:   Head: Normocephalic and atraumatic. Head is without abrasion, without contusion and without laceration.   Ears:   Right Ear: External ear normal.   Left Ear: External ear normal.   Nose: Nose normal.   Mouth/Throat: " Oropharynx is clear and moist and mucous membranes are normal.   Eyes: Conjunctivae, EOM and lids are normal.   Neck: Trachea normal and phonation normal.   Cardiovascular: Normal rate.   Pulmonary/Chest: Effort normal. No respiratory distress.   Abdominal: Normal appearance.   Musculoskeletal: Normal range of motion.         General: Normal range of motion.        Hands:    Neurological: He is alert and oriented to person, place, and time.   Skin: Skin is warm, dry, intact, not diaphoretic and no rash. erythema No abrasion, No burn, No bruising and No ecchymosis   Psychiatric: His speech is normal and behavior is normal. Judgment and thought content normal.   Nursing note and vitals reviewed.    Assessment:     1. Local reaction to insect sting, accidental or unintentional, initial encounter        Plan:       Local reaction to insect sting, accidental or unintentional, initial encounter  -     dexAMETHasone injection 10 mg  -     methylPREDNISolone (MEDROL DOSEPACK) 4 mg tablet; use as directed  Dispense: 21 each; Refill: 0      Patient Instructions   - Follow up with your PCP or specialty clinic as directed in the next 1-2 weeks if not improved or as needed.  You can call [phone] to schedule an appointment with the appropriate provider.    - Go to the ER or seek medical attention immediately if you develop new or worsening symptoms.    - You must understand that you have received an Urgent Care treatment only and that you may be released before all of your medical problems are known or treated.   - You, the patient, will arrange for follow up care as instructed.   - If your condition worsens or fails to improve we recommend that you receive another evaluation at the ER immediately or contact your PCP to discuss your concerns or return here.     Patient Instructions   Insect Bite with Local Reaction  Please return here or go to the Emergency Department for any concerns or worsening of condition.   Over the  Counter Claritin or Zyrtec as directed daily for the next 3-5 days. You can take Benadryl at night if needed  Also take Pepcid daily as directed for the next 3-5 days.   Cool Compresses to the affected area.   Follow up with your PCP in the next 2-3 days if no improvement   If you develop an increase in redness, swelling, tenderness, drainage, fever, nausea/vomiting, trouble breathing, speaking or swallowing, ect., go to the ER.

## 2023-05-19 LAB
LEFT CBA DIAS: 19 CM/S
LEFT CBA SYS: 77 CM/S
LEFT CCA DIST DIAS: 23 CM/S
LEFT CCA DIST SYS: 88 CM/S
LEFT CCA MID DIAS: 19 CM/S
LEFT CCA MID SYS: 88 CM/S
LEFT CCA PROX DIAS: 21 CM/S
LEFT CCA PROX SYS: 116 CM/S
LEFT ECA DIAS: 10 CM/S
LEFT ECA SYS: 111 CM/S
LEFT ICA DIST DIAS: 31 CM/S
LEFT ICA DIST SYS: 127 CM/S
LEFT ICA MID DIAS: 28 CM/S
LEFT ICA MID SYS: 97 CM/S
LEFT ICA PROX DIAS: 22 CM/S
LEFT ICA PROX SYS: 81 CM/S
LEFT VERTEBRAL DIAS: 14 CM/S
LEFT VERTEBRAL SYS: 78 CM/S
OHS CV CAROTID RIGHT ICA EDV HIGHEST: 29
OHS CV CAROTID ULTRASOUND LEFT ICA/CCA RATIO: 1.44
OHS CV CAROTID ULTRASOUND RIGHT ICA/CCA RATIO: 1.29
OHS CV PV CAROTID LEFT HIGHEST CCA: 116
OHS CV PV CAROTID LEFT HIGHEST ICA: 127
OHS CV PV CAROTID RIGHT HIGHEST CCA: 127
OHS CV PV CAROTID RIGHT HIGHEST ICA: 103
OHS CV US CAROTID LEFT HIGHEST EDV: 31
RIGHT CBA DIAS: 15 CM/S
RIGHT CBA SYS: 76 CM/S
RIGHT CCA DIST DIAS: 13 CM/S
RIGHT CCA DIST SYS: 80 CM/S
RIGHT CCA MID DIAS: 16 CM/S
RIGHT CCA MID SYS: 83 CM/S
RIGHT CCA PROX DIAS: 16 CM/S
RIGHT CCA PROX SYS: 127 CM/S
RIGHT ECA DIAS: 10 CM/S
RIGHT ECA SYS: 121 CM/S
RIGHT ICA DIST DIAS: 26 CM/S
RIGHT ICA DIST SYS: 93 CM/S
RIGHT ICA MID DIAS: 29 CM/S
RIGHT ICA MID SYS: 103 CM/S
RIGHT ICA PROX DIAS: 25 CM/S
RIGHT ICA PROX SYS: 93 CM/S
RIGHT VERTEBRAL DIAS: 19 CM/S
RIGHT VERTEBRAL SYS: 61 CM/S

## 2023-05-22 ENCOUNTER — PATIENT MESSAGE (OUTPATIENT)
Dept: FAMILY MEDICINE | Facility: CLINIC | Age: 78
End: 2023-05-22
Payer: MEDICARE

## 2023-05-22 DIAGNOSIS — E78.2 MIXED HYPERLIPIDEMIA: Chronic | ICD-10-CM

## 2023-05-22 DIAGNOSIS — G45.3 AMAUROSIS FUGAX OF RIGHT EYE: Primary | ICD-10-CM

## 2023-05-22 DIAGNOSIS — I10 ESSENTIAL HYPERTENSION: ICD-10-CM

## 2023-05-22 NOTE — PROGRESS NOTES
Your ultrasound showing some abnormalities.  As you can see from the description in your chart there is some tortuosity to the vessel on the left that could be making this a false read tight.  However as discussed regarding your medications after having the vision loss.  I would recommend that we adjust your cholesterol medicine to something that is more potent such as atorvastatin 40 mg.  This is for preventative reasons.    Please let me know if you are okay with me sending seen.  Think it is reasonable to proceed with a cardiology referral as well as we discussed at your last office visit.  Please let me know if you would like me to place this referral    Sincerely,  Bernard Davalos MD

## 2023-05-23 RX ORDER — ATORVASTATIN CALCIUM 40 MG/1
40 TABLET, FILM COATED ORAL DAILY
Qty: 90 TABLET | Refills: 1 | Status: SHIPPED | OUTPATIENT
Start: 2023-05-23 | End: 2023-08-02 | Stop reason: SDUPTHER

## 2023-06-01 ENCOUNTER — PES CALL (OUTPATIENT)
Dept: ADMINISTRATIVE | Facility: CLINIC | Age: 78
End: 2023-06-01
Payer: MEDICARE

## 2023-06-14 ENCOUNTER — OFFICE VISIT (OUTPATIENT)
Dept: CARDIOLOGY | Facility: CLINIC | Age: 78
End: 2023-06-14
Payer: MEDICARE

## 2023-06-14 ENCOUNTER — TELEPHONE (OUTPATIENT)
Dept: NEUROLOGY | Facility: CLINIC | Age: 78
End: 2023-06-14
Payer: MEDICARE

## 2023-06-14 VITALS
RESPIRATION RATE: 18 BRPM | WEIGHT: 169.56 LBS | SYSTOLIC BLOOD PRESSURE: 132 MMHG | BODY MASS INDEX: 26.61 KG/M2 | HEIGHT: 67 IN | DIASTOLIC BLOOD PRESSURE: 70 MMHG | HEART RATE: 62 BPM | OXYGEN SATURATION: 96 %

## 2023-06-14 DIAGNOSIS — E78.2 MIXED HYPERLIPIDEMIA: Chronic | ICD-10-CM

## 2023-06-14 DIAGNOSIS — G45.3 AMAUROSIS FUGAX OF RIGHT EYE: Primary | ICD-10-CM

## 2023-06-14 DIAGNOSIS — R01.1 MURMUR, CARDIAC: ICD-10-CM

## 2023-06-14 DIAGNOSIS — Z13.6 SCREENING FOR AAA (ABDOMINAL AORTIC ANEURYSM): ICD-10-CM

## 2023-06-14 DIAGNOSIS — I10 ESSENTIAL HYPERTENSION: ICD-10-CM

## 2023-06-14 DIAGNOSIS — I65.22 CAROTID ATHEROSCLEROSIS, LEFT: ICD-10-CM

## 2023-06-14 PROCEDURE — 93010 ELECTROCARDIOGRAM REPORT: CPT | Mod: S$PBB,,, | Performed by: INTERNAL MEDICINE

## 2023-06-14 PROCEDURE — 93005 ELECTROCARDIOGRAM TRACING: CPT | Mod: PBBFAC,PO | Performed by: INTERNAL MEDICINE

## 2023-06-14 PROCEDURE — 99215 OFFICE O/P EST HI 40 MIN: CPT | Mod: PBBFAC,PO | Performed by: INTERNAL MEDICINE

## 2023-06-14 PROCEDURE — 99999 PR PBB SHADOW E&M-EST. PATIENT-LVL V: ICD-10-PCS | Mod: PBBFAC,,, | Performed by: INTERNAL MEDICINE

## 2023-06-14 PROCEDURE — 93010 EKG 12-LEAD: ICD-10-PCS | Mod: S$PBB,,, | Performed by: INTERNAL MEDICINE

## 2023-06-14 PROCEDURE — 99999 PR PBB SHADOW E&M-EST. PATIENT-LVL V: CPT | Mod: PBBFAC,,, | Performed by: INTERNAL MEDICINE

## 2023-06-14 PROCEDURE — 99205 OFFICE O/P NEW HI 60 MIN: CPT | Mod: S$PBB,25,, | Performed by: INTERNAL MEDICINE

## 2023-06-14 PROCEDURE — 99205 PR OFFICE/OUTPT VISIT, NEW, LEVL V, 60-74 MIN: ICD-10-PCS | Mod: S$PBB,25,, | Performed by: INTERNAL MEDICINE

## 2023-06-14 RX ORDER — ASPIRIN 81 MG/1
81 TABLET ORAL DAILY
Qty: 90 TABLET | Refills: 3 | COMMUNITY
Start: 2023-06-14

## 2023-06-14 NOTE — PROGRESS NOTES
CARDIOVASCULAR CONSULTATION    REASON FOR CONSULT:   Johnathan Waters is a 78 y.o. male who presents for ?R eye amaurosis.    Req/PCP: Susannah  HISTORY OF PRESENT ILLNESS:   The patient presents the request of his primary care physician for an episode of right eye amaurosis.  Describes the sudden onset of loss of the entire lower visual field of his right eye.  His left eye was entirely normal.  This lasted for several minutes and resolved spontaneously.  He was seen by an ophthalmologist the following day and apparently was told there was nothing wrong with his eyes.  He subsequently had a carotid ultrasound noting possible left carotid atherosclerosis versus vessel tortuosity (stenosis less than 50%).  Was told that this might have been a cardiac event.  He denies any prior stroke or TIA.  He had an episode of palpitations several years ago for which he underwent cardiac workup which was negative.  He denies any palpitations, lightheadedness, dizziness, or syncope.  He denies any angina or dyspnea and exercises frequently without any symptoms.  There has been no PND, orthopnea, or lower extremity edema.  He denies melena, hematuria, or claudicant symptoms.      Family history is notable for the absence premature CAD or sudden cardiac death amongst first-degree relatives.      The patient is a nonsmoker.  Denies alcohol excess or illicit drug use.  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    CARDIOVASCULAR HISTORY:   ?L car atherosclerosis vs tortuous vessel (CUS 5/2023)    PAST MEDICAL HISTORY:     Past Medical History:   Diagnosis Date    Elevated cholesterol     Hypertension     Squamous cell carcinoma     under right eye.  Sees outside Dermatology    Trigger ring finger of right hand 1/30/2019       PAST SURGICAL HISTORY:     Past Surgical History:   Procedure Laterality Date    COLONOSCOPY N/A 9/22/2020    Procedure: COLONOSCOPY;  Surgeon: Manuela Ward MD;  Location: [facility];  Service:  Endoscopy;  Laterality: N/A;    EYE SURGERY      TRIGGER FINGER RELEASE Left 3/21/2023    Procedure: RELEASE, TRIGGER FINGER;  Surgeon: Gaby Martinez MD;  Location: [facility];  Service: Orthopedics;  Laterality: Left;  RN PREOP 03/14/2023---NEED H/P    VASECTOMY         ALLERGIES AND MEDICATION:     Review of patient's allergies indicates:   Allergen Reactions    Wasp venom Itching and Swelling    Sulfa (sulfonamide antibiotics) Other (See Comments)     Patient unsure, toddler        Medication List            Accurate as of June 14, 2023  8:40 AM. If you have any questions, ask your nurse or doctor.                CONTINUE taking these medications      atorvastatin 40 MG tablet  Commonly known as: LIPITOR  Take 1 tablet (40 mg total) by mouth once daily.     cetirizine 10 MG tablet  Commonly known as: ZYRTEC     lisinopriL-hydrochlorothiazide 10-12.5 mg per tablet  Commonly known as: PRINZIDE,ZESTORETIC  Take 1 tablet by mouth once daily.     mometasone 0.1% 0.1 % cream  Commonly known as: ELOCON  Apply topically daily as needed (rash).     tamsulosin 0.4 mg Cap  Commonly known as: FLOMAX  Take 1 capsule (0.4 mg total) by mouth once daily.            STOP taking these medications      HYDROcodone-acetaminophen 5-325 mg per tablet  Commonly known as: NORCO  Stopped by: Luis Antonio Small MD     ondansetron 4 MG tablet  Commonly known as: ZOFRAN  Stopped by: Luis Antonio Small MD              SOCIAL HISTORY:     Social History     Socioeconomic History    Marital status:    Occupational History    Occupation: Retired   Tobacco Use    Smoking status: Never    Smokeless tobacco: Never   Substance and Sexual Activity    Alcohol use: Yes     Comment: Rarely    Drug use: No    Sexual activity: Yes     Partners: Female     Comment:      Social Determinants of Health     Financial Resource Strain: Low Risk     Difficulty of Paying Living Expenses: Not hard at all   Food Insecurity: No Food Insecurity     Worried About Running Out of Food in the Last Year: Never true    Ran Out of Food in the Last Year: Never true   Transportation Needs: No Transportation Needs    Lack of Transportation (Medical): No    Lack of Transportation (Non-Medical): No   Physical Activity: Sufficiently Active    Days of Exercise per Week: 4 days    Minutes of Exercise per Session: 60 min   Stress: No Stress Concern Present    Feeling of Stress : Not at all   Social Connections: Unknown    Frequency of Communication with Friends and Family: Three times a week    Frequency of Social Gatherings with Friends and Family: Once a week    Active Member of Clubs or Organizations: No    Attends Club or Organization Meetings: Patient refused    Marital Status:    Housing Stability: Low Risk     Unable to Pay for Housing in the Last Year: No    Number of Places Lived in the Last Year: 1    Unstable Housing in the Last Year: No       FAMILY HISTORY:     Family History   Problem Relation Age of Onset    Hypertension Mother     Cancer Father         lung    Diabetes Sister     Asthma Daughter     No Known Problems Daughter        REVIEW OF SYSTEMS:   Review of Systems   Constitutional:  Negative for chills, diaphoresis and fever.   HENT:  Negative for nosebleeds.    Eyes:  Negative for blurred vision, double vision and photophobia.        Brief R eye lower field visual loss   Respiratory:  Negative for hemoptysis, shortness of breath and wheezing.    Cardiovascular:  Negative for chest pain, palpitations, orthopnea, claudication, leg swelling and PND.   Gastrointestinal:  Negative for abdominal pain, blood in stool, heartburn, melena, nausea and vomiting.   Genitourinary:  Negative for flank pain and hematuria.   Musculoskeletal:  Negative for falls, myalgias and neck pain.   Skin:  Negative for rash.   Neurological:  Negative for dizziness, seizures, loss of consciousness, weakness and headaches.   Endo/Heme/Allergies:  Negative for polydipsia. Does  "not bruise/bleed easily.   Psychiatric/Behavioral:  Negative for depression and memory loss. The patient is not nervous/anxious.      PHYSICAL EXAM:     Vitals:    06/14/23 0820   BP: 132/70   Pulse: 62   Resp: 18    Body mass index is 26.55 kg/m².  Weight: 76.9 kg (169 lb 8.5 oz)   Height: 5' 7" (170.2 cm)     Physical Exam  Vitals reviewed.   Constitutional:       General: He is not in acute distress.     Appearance: Normal appearance. He is well-developed and normal weight. He is not ill-appearing, toxic-appearing or diaphoretic.   HENT:      Head: Normocephalic and atraumatic.   Eyes:      General: No scleral icterus.     Extraocular Movements: Extraocular movements intact.      Conjunctiva/sclera: Conjunctivae normal.      Pupils: Pupils are equal, round, and reactive to light.   Neck:      Thyroid: No thyromegaly.      Vascular: Normal carotid pulses. Carotid bruit present. No JVD.      Trachea: Trachea normal.   Cardiovascular:      Rate and Rhythm: Normal rate and regular rhythm.      Pulses:           Carotid pulses are 2+ on the right side and 2+ on the left side with bruit.     Heart sounds: S1 normal and S2 normal. Murmur heard.   Systolic murmur is present with a grade of 1/6.     No friction rub. No gallop.   Pulmonary:      Effort: Pulmonary effort is normal. No respiratory distress.      Breath sounds: Normal breath sounds. No stridor. No wheezing, rhonchi or rales.   Chest:      Chest wall: No tenderness.   Abdominal:      General: There is no distension.      Palpations: Abdomen is soft.   Musculoskeletal:         General: No swelling or tenderness. Normal range of motion.      Cervical back: Normal range of motion and neck supple. No edema or rigidity.      Right lower leg: No edema.      Left lower leg: No edema.   Feet:      Right foot:      Skin integrity: No ulcer.      Left foot:      Skin integrity: No ulcer.   Skin:     General: Skin is warm and dry.      Coloration: Skin is not jaundiced. "   Neurological:      General: No focal deficit present.      Mental Status: He is alert and oriented to person, place, and time.      Cranial Nerves: No cranial nerve deficit.   Psychiatric:         Mood and Affect: Mood normal.         Speech: Speech normal.         Behavior: Behavior normal. Behavior is cooperative.       DATA:   EKG: (personally reviewed tracing(s))  6/14/23 SR 63, PAC, PRWP, similar to 3/14/23    Laboratory:  CBC:  Recent Labs   Lab 07/29/22  0821 03/14/23  0910 04/25/23  1518   WBC 7.47 7.18 7.51   Hemoglobin 14.0 13.3 L 13.5 L   Hematocrit 43.2 39.2 L 42.9   Platelets 285 267 305       CHEMISTRIES:  Recent Labs   Lab 07/20/20  0808 01/04/21  1648 07/24/21  0826 07/29/22  0821 03/14/23  0910   Glucose 88 131 H 92 92 96   Sodium 141 141 140 140 141   Potassium 3.7 3.6 4.0 4.1 3.4 L   BUN 16 16 16 16 20   Creatinine 1.0 1.0 1.1 1.0 1.1   eGFR if non African American >60.0 >60.0 >60.0 >60.0  --    eGFR  --   --   --   --  >60   Calcium 9.0 9.2 9.7 9.1 8.8       CARDIAC BIOMARKERS:        COAGS:  Recent Labs   Lab 04/25/23  1518   INR 1.0       LIPIDS/LFTS:  Recent Labs   Lab 07/20/20  0808 07/24/21  0826 07/29/22  0821 03/14/23  0910   Cholesterol 161 172 171  --    Triglycerides 73 68 97  --    HDL 49 59 54  --    LDL Cholesterol 97.4 99.4 97.6  --    Non-HDL Cholesterol 112 113 117  --    AST 16 17 17 14   ALT 18 25 25 17       Cardiovascular Testing:  Carotid US 5/18/23  There is 0-19% right Internal Carotid Stenosis.  There is 40-49% left Internal Carotid Stenosis. Vessel tortuosity in area of increased velocity (may artifactually suggest stenosis).    Ex stress echo 8/20/18  The patient exercised for 10.5 minutes, corresponding to a functional capacity of 12 estimated METS, achieving a peak heart rate of 142 bpm, which is 97% of the age predicted maximum heart rate.   There were no significant electrocardiographic changes throughout the protocol suggesting ischemia.   EKG Conclusions:   1.  The EKG portion of this study is negative for ischemia at a high workload, and peak heart rate of 142 bpm (97% of predicted).   2. Blood pressure response to exercise was normal (Presenting BP: 139/65 Peak BP: 187/82).   3. No significant arrhythmias were present.   4. There were no symptoms of chest discomfort or significant dyspnea throughout the protocol.   Post Exercise Imaging:   Immediate post exercise images demonstrate left ventricular function augmenting.   No exercise induced wall motion abnormalities were identified.   CONCLUSIONS     1 - Normal left ventricular systolic function (EF 55-60%).     2 - Concentric remodeling.     3 - Mild left atrial enlargement.   No evidence of stress induced myocardial ischemia.     Holter 7/27/18  PREDOMINANT RHYTHM   1. Sinus rhythm with heart rates varying between 36 and 141 bpm with an average of 56 bpm.   VENTRICULAR ARRHYTHMIAS   1. There were very rare PVCs recorded totalling 26 and averaging less than 1 per hour.   2. There were no episodes of ventricular tachycardia.   SUPRA VENTRICULAR ARRHYTHMIAS   1. There were occasional PACs totalling 597 and averaging 12 per hour.  There were 38 couplets.   2. There were 2 runs of EAT lasting from 3 to 7 beats.   SINUS NODE FUNCTION   1. There was no evidence of high grade SA rosibel block.   AV CONDUCTION   1. There was no evidence of high grade AV block.   DIARY   1. The diary was not returned   MISCELLANEOUS   1. There were rare hookup related artifacts. Overall, the study was of good quality.   2. This was a tape of adequate length (48 hrs).       ASSESSMENT:   # ?amaurosis R eye, seems atypical.  Neg optho exam.  Sxs abated.  # HTN, controlled  # HLP on atorva 40mg  # ?L carotid atherosclerosis (CUS 5/2023 with ?tortuosity), L carotid bruit  # murmur of aortic sclerosis    PLAN:   Cont med rx  Add ASA 81mg qd  Neuro eval  Echo with bubble study  Holter  Aortic US  RTC 1 month.  If neg eval above and neuro eval thinks  this is TIA, will plan EVM/ILR.  Surveillance carotid US 6 months (Dec 2023)    Luis Antonio Small MD, [facility]

## 2023-06-15 NOTE — TELEPHONE ENCOUNTER
----- Message from Filippo Girard RN sent at 6/14/2023 12:36 PM CDT -----  Regarding: FW: Dr. Small(Cardiology Referral)  Just passing along. Hope all is well!      ----- Message -----  From: Jenifer Hoyt MD  Sent: 6/14/2023  10:08 AM CDT  To: Irene Gutierres RN, Peggy Maria LPN, #  Subject: RE: Dr. Small(Cardiology Referral)             For her hx of amaruosis fugax, would best be seen by vascular neurology / stroke specialist [facility] [place].     Jenifer Hoyt MD  [facility]    ----- Message -----  From: Susan Reyes, Patient Care Assistant  Sent: 6/14/2023   9:33 AM CDT  To: Irene Gutierres RN, Peggy Maria LPN, #  Subject: Dr. 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 Small.    Thank you,     Susan Reyes MA        
Called and received pt vm. Left message w clinic number and instructed pt to call back to be scheduled.   
Statement Selected

## 2023-06-16 ENCOUNTER — TELEPHONE (OUTPATIENT)
Dept: NEUROLOGY | Facility: CLINIC | Age: 78
End: 2023-06-16
Payer: MEDICARE

## 2023-06-16 NOTE — TELEPHONE ENCOUNTER
Scheduled patient for 7/6 for 9:20 with Dr. Starkey.     ----- Message from Irene Gutierres RN sent at 6/16/2023  1:51 PM CDT -----  Regarding: FW: Dr. Small(Cardiology Referral)  VN referral  Please schedule  ----- Message -----  From: Jenifer Hoyt MD  Sent: 6/14/2023  10:08 AM CDT  To: Irene Gutierres RN, Peggy Maria LPN, #  Subject: RE: Dr. Small(Cardiology Referral)             For her hx of amaruosis fugax, would best be seen by vascular neurology / [place] [facility] [place]     Jenifer Hoyt MD  [facility]    ----- Message -----  From: Susan Reyes, Patient Care Assistant  Sent: 6/14/2023   9:33 AM CDT  To: Irene Gutierres RN, Peggy Maria LPN, #  Subject: Dr. 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 Small.    Thank you,     Susan Reyes MA

## 2023-06-26 ENCOUNTER — PATIENT MESSAGE (OUTPATIENT)
Dept: FAMILY MEDICINE | Facility: CLINIC | Age: 78
End: 2023-06-26
Payer: MEDICARE

## 2023-07-06 ENCOUNTER — OFFICE VISIT (OUTPATIENT)
Dept: NEUROLOGY | Facility: CLINIC | Age: 78
End: 2023-07-06
Payer: MEDICARE

## 2023-07-06 VITALS — WEIGHT: 171.94 LBS | BODY MASS INDEX: 26.99 KG/M2 | HEIGHT: 67 IN

## 2023-07-06 DIAGNOSIS — E78.2 MIXED HYPERLIPIDEMIA: Chronic | ICD-10-CM

## 2023-07-06 DIAGNOSIS — I63.89 OTHER CEREBRAL INFARCTION: ICD-10-CM

## 2023-07-06 DIAGNOSIS — I10 ESSENTIAL HYPERTENSION: Chronic | ICD-10-CM

## 2023-07-06 DIAGNOSIS — G45.3 AMAUROSIS FUGAX OF RIGHT EYE: Primary | ICD-10-CM

## 2023-07-06 PROCEDURE — 99204 PR OFFICE/OUTPT VISIT, NEW, LEVL IV, 45-59 MIN: ICD-10-PCS | Mod: S$PBB,,, | Performed by: STUDENT IN AN ORGANIZED HEALTH CARE EDUCATION/TRAINING PROGRAM

## 2023-07-06 PROCEDURE — 99204 OFFICE O/P NEW MOD 45 MIN: CPT | Mod: S$PBB,,, | Performed by: STUDENT IN AN ORGANIZED HEALTH CARE EDUCATION/TRAINING PROGRAM

## 2023-07-06 PROCEDURE — 99999 PR PBB SHADOW E&M-EST. PATIENT-LVL III: CPT | Mod: PBBFAC,,, | Performed by: STUDENT IN AN ORGANIZED HEALTH CARE EDUCATION/TRAINING PROGRAM

## 2023-07-06 PROCEDURE — 99213 OFFICE O/P EST LOW 20 MIN: CPT | Mod: PBBFAC | Performed by: STUDENT IN AN ORGANIZED HEALTH CARE EDUCATION/TRAINING PROGRAM

## 2023-07-06 PROCEDURE — 99999 PR PBB SHADOW E&M-EST. PATIENT-LVL III: ICD-10-PCS | Mod: PBBFAC,,, | Performed by: STUDENT IN AN ORGANIZED HEALTH CARE EDUCATION/TRAINING PROGRAM

## 2023-07-06 NOTE — PROGRESS NOTES
Neurology Clinic Note      Date: 7/6/23  Patient Name: Johnathan Waters   MRN: [id number]   PCP: Bernard Davalos  Referring Provider: Luis Antonio Small MD    Assessment and Plan:   Johnathan Waters is a 78 y.o. male presenting for evaluation of right-sided amaurosis fugax.  His vascular risk factors include hypertension and hyperlipidemia.  His carotid ultrasound showed no evidence of high-grade stenosis in the ipsilateral extracranial carotid.  He will need an MRA of the head to evaluate his intracranial carotids along with an MRI of the brain to evaluate for any clinically silent cerebral infarcts that could potentially indicate a cardioembolic etiology.  Scheduled to undergo TTE and 48 hr Holter monitoring.  Recommend continuing ASA 81mg daily and Lipitor 40mg daily.  Discussed regular physical activity. 30 mins of cardio atleast 3-4 days per week  Mediterranean Diet recommendations (Adopted from Maria De Jesus et al, [facility], 2018.)  -- Abundant use of Olive Oil for cooking and dressing dishes. Use herbs and spices instead to salt flavored foods.  -- Consumption of ?2 daily servings of vegetables (at least 1 of them as fresh vegetables in a salad), discounting side dishes.  -- ?2-3 daily servings of fresh fruits, ?3 weekly servings of legumes  -- ?3 weekly servings of fish or seafood (at least 1 serving of fatty fish).  Limit red meat and processed meats to no more than few times a month.  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 Johnathan Waters is a 78 y.o. male with a history of HTN, HLD presenting for evaluation of  amaurosis fugax.    On April 18th, 2023 he experienced a transient episode of vision loss in the right eye, involving the lower half of the visual field.  Symptoms lasted 1-2 minutes.  Denies any associated focal neurological symptoms such as slurred speech, focal weakness/numbness, double vision, vertigo.  He saw an ophthalmologist the next day and was told that his eyes are fine.  He subsequently followed up with his cardiologist and was started on aspirin 81 mg and Lipitor 40mg.  Carotid ultrasound in April showed < 50% stenosis bilaterally. He is also scheduled to undergo a TTE and 48 hour Holter monitoring.    Of note, he would an episode of palpitations several years ago for which she underwent a TTE and 48 hour Holter monitoring. Apart from mild left atrial enlargement on his TTE, his workup was unremarkable.  He was told that his symptoms were due to too much caffeine.  He has not had any similar symptoms since then.    He also mentions a history of visual auras (describes them as zigzag lines in his visual field) that occur around once every 6 months.  No associated headache or migrainous features.    No history of tobacco or illicit drug use.  He exercises regularly.        PAST MEDICAL HISTORY:  Past Medical History:   Diagnosis Date    Elevated cholesterol     Hypertension     Squamous cell carcinoma     under right eye.  Sees outside Dermatology    Trigger ring finger of right hand 1/30/2019       PAST SURGICAL HISTORY:  Past Surgical History:   Procedure Laterality Date    COLONOSCOPY N/A 9/22/2020    Procedure: COLONOSCOPY;  Surgeon: Manuela Ward MD;  Location: [facility] ENDO;  Service: Endoscopy;  Laterality: N/A;    EYE SURGERY      TRIGGER FINGER RELEASE Left 3/21/2023    Procedure: RELEASE, TRIGGER FINGER;  Surgeon: Gaby Martinez MD;  Location: [facility] OR;  Service: Orthopedics;  Laterality: Left;  RN PREOP 03/14/2023---NEED H/P    VASECTOMY         CURRENT MEDS:  Current Outpatient Medications  "  Medication Sig Dispense Refill    aspirin (ECOTRIN) 81 MG EC tablet Take 1 tablet (81 mg total) by mouth once daily. 90 tablet 3    atorvastatin (LIPITOR) 40 MG tablet Take 1 tablet (40 mg total) by mouth once daily. 90 tablet 1    cetirizine (ZYRTEC) 10 MG tablet       lisinopriL-hydrochlorothiazide (PRINZIDE,ZESTORETIC) 10-12.5 mg per tablet Take 1 tablet by mouth once daily. 90 tablet 3    mometasone 0.1% (ELOCON) 0.1 % cream Apply topically daily as needed (rash). 50 g 2    tamsulosin (FLOMAX) 0.4 mg Cap Take 1 capsule (0.4 mg total) by mouth once daily. 90 capsule 3     No current facility-administered medications for this visit.       ALLERGIES:  Review of patient's allergies indicates:   Allergen Reactions    Wasp venom Itching and Swelling    Sulfa (sulfonamide antibiotics) Other (See Comments)     Patient unsure, toddler       FAMILY HISTORY:  Family History   Problem Relation Age of Onset    Hypertension Mother     Cancer Father         lung    Diabetes Sister     Asthma Daughter     No Known Problems Daughter        SOCIAL HISTORY:  Social History     Tobacco Use    Smoking status: Never    Smokeless tobacco: Never   Substance Use Topics    Alcohol use: Yes     Comment: Rarely    Drug use: No       Review of Systems:  12 system review of systems is negative except for the symptoms mentioned in HPI.      Objective:     Vitals:    07/06/23 0903   Weight: 78 kg (171 lb 15.3 oz)   Height: 5' 7" (1.702 m)     General: NAD, well nourished   Eyes: no tearing, discharge, no erythema   Neck: Supple, full range of motion  Cardiovascular: Warm and well perfused  Lungs: Normal work of breathing  Skin: No rash, lesions, or breakdown on exposed skin  Psychiatry: Mood and affect are appropriate       NEUROLOGICAL EXAMINATION:     MENTAL STATUS   Oriented to person, place, and time.   Follows 2 step commands.   Level of consciousness: alert    CRANIAL NERVES     CN II   Visual fields full to confrontation.   Right " visual field deficit: none  Left visual field deficit: none     CN III, IV, VI   Extraocular motions are normal.   Nystagmus: none   Ophthalmoparesis: none    CN V   Facial sensation intact.     CN VII   Facial expression full, symmetric.     CN XI   CN XI normal.     CN XII   CN XII normal.     MOTOR EXAM   Right arm pronator drift: absent  Left arm pronator drift: absent       5/5 in bilateral upper and lower extremities.     REFLEXES     Reflexes   Right brachioradialis: 2+  Left brachioradialis: 2+  Right biceps: 2+  Left biceps: 2+  Right patellar: 2+  Left patellar: 2+    SENSORY EXAM   Light touch normal.     GAIT AND COORDINATION     Gait  Gait: normal     Coordination   Finger to nose coordination: normal    Tremor   Intention tremor: absent      Images:    Other Studies:          Lalito Acuna MD  Department of Neurology  [facility]

## 2023-07-13 ENCOUNTER — HOSPITAL ENCOUNTER (OUTPATIENT)
Dept: RADIOLOGY | Facility: HOSPITAL | Age: 78
Discharge: HOME | End: 2023-07-13
Attending: STUDENT IN AN ORGANIZED HEALTH CARE EDUCATION/TRAINING PROGRAM
Payer: MEDICARE

## 2023-07-13 DIAGNOSIS — G45.3 AMAUROSIS FUGAX OF RIGHT EYE: ICD-10-CM

## 2023-07-13 DIAGNOSIS — I63.89 OTHER CEREBRAL INFARCTION: ICD-10-CM

## 2023-07-13 PROCEDURE — 70551 MRI BRAIN STEM W/O DYE: CPT | Mod: TC

## 2023-07-13 PROCEDURE — 70551 MRI BRAIN STEM W/O DYE: CPT | Mod: 26,,, | Performed by: RADIOLOGY

## 2023-07-13 PROCEDURE — 70544 MRA BRAIN WITHOUT CONTRAST: ICD-10-PCS | Mod: 26,59,, | Performed by: RADIOLOGY

## 2023-07-13 PROCEDURE — 70544 MR ANGIOGRAPHY HEAD W/O DYE: CPT | Mod: 26,59,, | Performed by: RADIOLOGY

## 2023-07-13 PROCEDURE — 70551 MRI BRAIN WITHOUT CONTRAST: ICD-10-PCS | Mod: 26,,, | Performed by: RADIOLOGY

## 2023-07-13 PROCEDURE — 70544 MR ANGIOGRAPHY HEAD W/O DYE: CPT | Mod: TC

## 2023-07-17 ENCOUNTER — TELEPHONE (OUTPATIENT)
Dept: NEUROLOGY | Facility: CLINIC | Age: 78
End: 2023-07-17
Payer: MEDICARE

## 2023-07-20 ENCOUNTER — HOSPITAL ENCOUNTER (OUTPATIENT)
Dept: CARDIOLOGY | Facility: HOSPITAL | Age: 78
Discharge: HOME | End: 2023-07-20
Attending: INTERNAL MEDICINE
Payer: MEDICARE

## 2023-07-20 VITALS — WEIGHT: 171 LBS | BODY MASS INDEX: 26.84 KG/M2 | HEIGHT: 67 IN

## 2023-07-20 DIAGNOSIS — G45.3 AMAUROSIS FUGAX OF RIGHT EYE: ICD-10-CM

## 2023-07-20 DIAGNOSIS — I10 ESSENTIAL HYPERTENSION: ICD-10-CM

## 2023-07-20 DIAGNOSIS — Z13.6 SCREENING FOR AAA (ABDOMINAL AORTIC ANEURYSM): ICD-10-CM

## 2023-07-20 LAB
ABDOMINAL IMA AP: 1.6 CM
ABDOMINAL IMA PS VEL: 162 CM/S
ABDOMINAL IMA TRANS: 1.8 CM
ABDOMINAL INFRARENAL AORTA AP: 2.3 CM
ABDOMINAL INFRARENAL AORTA PS VEL: 150 CM/S
ABDOMINAL INFRARENAL AORTA TRANS: 2 CM
ABDOMINAL JUXTARENAL AORTA AP: 1.5 CM
ABDOMINAL JUXTARENAL AORTA PS VEL: 107 CM/S
ABDOMINAL JUXTARENAL AORTA TRANS: 1.6 CM
ABDOMINAL LT COM ILIAC AP: 1 CM
ABDOMINAL LT COM ILIAC TRANS: 0.9 CM
ABDOMINAL LT COM ILIAC VEL: 168 CM/S
ABDOMINAL RT COM ILIAC AP: 0.8 CM
ABDOMINAL RT COM ILIAC TRANS: 0.9 CM
ABDOMINAL RT COM ILIAC VEL: 107 CM/S
ABDOMINAL SUPRARENAL AORTA AP: 2.6 CM
ABDOMINAL SUPRARENAL AORTA PS VEL: 107 CM/S
ABDOMINAL SUPRARENAL AORTA TRANS: 2.7 CM
ASCENDING AORTA: 2.96 CM
AV INDEX (PROSTH): 0.96
AV MEAN GRADIENT: 3 MMHG
AV PEAK GRADIENT: 6 MMHG
AV VALVE AREA: 3.06 CM2
AV VELOCITY RATIO: 0.91
BSA FOR ECHO PROCEDURE: 1.91 M2
CV ECHO LV RWT: 0.4 CM
DOP CALC AO PEAK VEL: 1.2 M/S
DOP CALC AO VTI: 28.2 CM
DOP CALC LVOT AREA: 3.2 CM2
DOP CALC LVOT DIAMETER: 2.01 CM
DOP CALC LVOT PEAK VEL: 1.09 M/S
DOP CALC LVOT STROKE VOLUME: 86.26 CM3
DOP CALC MV VTI: 35.3 CM
DOP CALCLVOT PEAK VEL VTI: 27.2 CM
E WAVE DECELERATION TIME: 187.16 MSEC
E/A RATIO: 0.97
E/E' RATIO: 11.25 M/S
ECHO LV POSTERIOR WALL: 1 CM (ref 0.6–1.1)
EJECTION FRACTION: 55 %
FRACTIONAL SHORTENING: 35 % (ref 28–44)
INTERVENTRICULAR SEPTUM: 1.01 CM (ref 0.6–1.1)
IVC DIAMETER: 1.37 CM
LA MAJOR: 5.09 CM
LA MINOR: 5.27 CM
LA WIDTH: 4.1 CM
LEFT ATRIUM SIZE: 4.17 CM
LEFT ATRIUM VOLUME INDEX: 39.8 ML/M2
LEFT ATRIUM VOLUME: 75.26 CM3
LEFT INTERNAL DIMENSION IN SYSTOLE: 3.25 CM (ref 2.1–4)
LEFT VENTRICLE DIASTOLIC VOLUME INDEX: 62.54 ML/M2
LEFT VENTRICLE DIASTOLIC VOLUME: 118.2 ML
LEFT VENTRICLE MASS INDEX: 97 G/M2
LEFT VENTRICLE SYSTOLIC VOLUME INDEX: 22.5 ML/M2
LEFT VENTRICLE SYSTOLIC VOLUME: 42.59 ML
LEFT VENTRICULAR INTERNAL DIMENSION IN DIASTOLE: 5 CM (ref 3.5–6)
LEFT VENTRICULAR MASS: 183.2 G
LV LATERAL E/E' RATIO: 11.25 M/S
LV SEPTAL E/E' RATIO: 11.25 M/S
LVOT MG: 2.67 MMHG
LVOT MV: 0.77 CM/S
MV MEAN GRADIENT: 2 MMHG
MV PEAK A VEL: 0.93 M/S
MV PEAK E VEL: 0.9 M/S
MV PEAK GRADIENT: 4 MMHG
MV STENOSIS PRESSURE HALF TIME: 54.28 MS
MV VALVE AREA BY CONTINUITY EQUATION: 2.44 CM2
MV VALVE AREA P 1/2 METHOD: 4.05 CM2
OHS CV US ABDOMINAL AORTA PSV HIGHEST VALUE: 162 CM/S
PISA MRMAX VEL: 5.54 M/S
PISA TR MAX VEL: 2.53 M/S
PV PEAK VELOCITY: 0.67 CM/S
RA MAJOR: 4.19 CM
RA PRESSURE: 8 MMHG
RA WIDTH: 3.2 CM
RV TISSUE DOPPLER FREE WALL SYSTOLIC VELOCITY 1 (APICAL 4 CHAMBER VIEW): 14.77 CM/S
SINUS: 2.78 CM
STJ: 1.91 CM
TDI LATERAL: 0.08 M/S
TDI SEPTAL: 0.08 M/S
TDI: 0.08 M/S
TR MAX PG: 26 MMHG
TRICUSPID ANNULAR PLANE SYSTOLIC EXCURSION: 2.25 CM
TV REST PULMONARY ARTERY PRESSURE: 34 MMHG

## 2023-07-20 PROCEDURE — 93978 VASCULAR STUDY: CPT

## 2023-07-20 PROCEDURE — 93227 XTRNL ECG REC<48 HR R&I: CPT | Mod: ,,, | Performed by: INTERNAL MEDICINE

## 2023-07-20 PROCEDURE — 93306 TTE W/DOPPLER COMPLETE: CPT | Mod: 26,,, | Performed by: INTERNAL MEDICINE

## 2023-07-20 PROCEDURE — 93306 ECHO (CUPID ONLY): ICD-10-PCS | Mod: 26,,, | Performed by: INTERNAL MEDICINE

## 2023-07-20 PROCEDURE — 93978 VASCULAR STUDY: CPT | Mod: 26,,, | Performed by: INTERNAL MEDICINE

## 2023-07-20 PROCEDURE — 93227 HOLTER MONITOR - 24 HOUR (CUPID ONLY): ICD-10-PCS | Mod: ,,, | Performed by: INTERNAL MEDICINE

## 2023-07-20 PROCEDURE — 93978 CV US ABDOMINAL AORTA EVALUATION (CUPID ONLY): ICD-10-PCS | Mod: 26,,, | Performed by: INTERNAL MEDICINE

## 2023-07-20 PROCEDURE — 93306 TTE W/DOPPLER COMPLETE: CPT

## 2023-07-20 PROCEDURE — 93226 XTRNL ECG REC<48 HR SCAN A/R: CPT

## 2023-07-21 LAB
OHS CV EVENT MONITOR DAY: 1
OHS CV HOLTER LENGTH DECIMAL HOURS: 48
OHS CV HOLTER LENGTH HOURS: 24
OHS CV HOLTER LENGTH MINUTES: 0
OHS CV HOLTER SINUS AVERAGE HR: 55
OHS CV HOLTER SINUS MAX HR: 92
OHS CV HOLTER SINUS MIN HR: 39

## 2023-07-26 ENCOUNTER — TELEPHONE (OUTPATIENT)
Dept: FAMILY MEDICINE | Facility: CLINIC | Age: 78
End: 2023-07-26
Payer: MEDICARE

## 2023-07-26 ENCOUNTER — LAB VISIT (OUTPATIENT)
Dept: LAB | Facility: HOSPITAL | Age: 78
End: 2023-07-26
Attending: INTERNAL MEDICINE
Payer: MEDICARE

## 2023-07-26 DIAGNOSIS — I10 ESSENTIAL HYPERTENSION: ICD-10-CM

## 2023-07-26 LAB
ALBUMIN SERPL BCP-MCNC: 3.6 G/DL (ref 3.5–5.2)
ALP SERPL-CCNC: 60 U/L (ref 55–135)
ALT SERPL W/O P-5'-P-CCNC: 29 U/L (ref 10–44)
ANION GAP SERPL CALC-SCNC: 8 MMOL/L (ref 8–16)
AST SERPL-CCNC: 18 U/L (ref 10–40)
BASOPHILS # BLD AUTO: 0.1 K/UL (ref 0–0.2)
BASOPHILS NFR BLD: 1.1 % (ref 0–1.9)
BILIRUB SERPL-MCNC: 0.8 MG/DL (ref 0.1–1)
BUN SERPL-MCNC: 21 MG/DL (ref 8–23)
CALCIUM SERPL-MCNC: 9.1 MG/DL (ref 8.7–10.5)
CHLORIDE SERPL-SCNC: 103 MMOL/L (ref 95–110)
CHOLEST SERPL-MCNC: 109 MG/DL (ref 120–199)
CHOLEST/HDLC SERPL: 2.2 {RATIO} (ref 2–5)
CO2 SERPL-SCNC: 30 MMOL/L (ref 23–29)
CREAT SERPL-MCNC: 1.1 MG/DL (ref 0.5–1.4)
DIFFERENTIAL METHOD: ABNORMAL
EOSINOPHIL # BLD AUTO: 0.5 K/UL (ref 0–0.5)
EOSINOPHIL NFR BLD: 5.4 % (ref 0–8)
ERYTHROCYTE [DISTWIDTH] IN BLOOD BY AUTOMATED COUNT: 13.5 % (ref 11.5–14.5)
EST. GFR  (NO RACE VARIABLE): >60 ML/MIN/1.73 M^2
GLUCOSE SERPL-MCNC: 88 MG/DL (ref 70–110)
HCT VFR BLD AUTO: 41.9 % (ref 40–54)
HDLC SERPL-MCNC: 50 MG/DL (ref 40–75)
HDLC SERPL: 45.9 % (ref 20–50)
HGB BLD-MCNC: 13.3 G/DL (ref 14–18)
IMM GRANULOCYTES # BLD AUTO: 0.02 K/UL (ref 0–0.04)
IMM GRANULOCYTES NFR BLD AUTO: 0.2 % (ref 0–0.5)
LDLC SERPL CALC-MCNC: 48.6 MG/DL (ref 63–159)
LYMPHOCYTES # BLD AUTO: 2.4 K/UL (ref 1–4.8)
LYMPHOCYTES NFR BLD: 25.7 % (ref 18–48)
MCH RBC QN AUTO: 28.7 PG (ref 27–31)
MCHC RBC AUTO-ENTMCNC: 31.7 G/DL (ref 32–36)
MCV RBC AUTO: 91 FL (ref 82–98)
MONOCYTES # BLD AUTO: 1 K/UL (ref 0.3–1)
MONOCYTES NFR BLD: 10.3 % (ref 4–15)
NEUTROPHILS # BLD AUTO: 5.3 K/UL (ref 1.8–7.7)
NEUTROPHILS NFR BLD: 57.3 % (ref 38–73)
NONHDLC SERPL-MCNC: 59 MG/DL
NRBC BLD-RTO: 0 /100 WBC
PLATELET # BLD AUTO: 311 K/UL (ref 150–450)
PMV BLD AUTO: 12.1 FL (ref 9.2–12.9)
POTASSIUM SERPL-SCNC: 4.2 MMOL/L (ref 3.5–5.1)
PROT SERPL-MCNC: 6.4 G/DL (ref 6–8.4)
RBC # BLD AUTO: 4.63 M/UL (ref 4.6–6.2)
SODIUM SERPL-SCNC: 141 MMOL/L (ref 136–145)
TRIGL SERPL-MCNC: 52 MG/DL (ref 30–150)
WBC # BLD AUTO: 9.18 K/UL (ref 3.9–12.7)

## 2023-07-26 PROCEDURE — 36415 COLL VENOUS BLD VENIPUNCTURE: CPT | Mod: PO | Performed by: INTERNAL MEDICINE

## 2023-07-26 PROCEDURE — 80061 LIPID PANEL: CPT | Performed by: INTERNAL MEDICINE

## 2023-07-26 PROCEDURE — 80053 COMPREHEN METABOLIC PANEL: CPT | Performed by: INTERNAL MEDICINE

## 2023-07-26 PROCEDURE — 85025 COMPLETE CBC W/AUTO DIFF WBC: CPT | Performed by: INTERNAL MEDICINE

## 2023-07-26 NOTE — TELEPHONE ENCOUNTER
"Spoke with patient to check up on him after request to see a provider past fall. Patient states "disregard the message from 7/21/23, I'm doing better. I see Dr. Davalos on next week 8/2/23. Patient appreciative for the follow up phone call."  "

## 2023-08-01 ENCOUNTER — PES CALL (OUTPATIENT)
Dept: ADMINISTRATIVE | Facility: CLINIC | Age: 78
End: 2023-08-01
Payer: MEDICARE

## 2023-08-01 DIAGNOSIS — I10 ESSENTIAL HYPERTENSION: ICD-10-CM

## 2023-08-01 RX ORDER — LISINOPRIL AND HYDROCHLOROTHIAZIDE 10; 12.5 MG/1; MG/1
1 TABLET ORAL
Refills: 0 | OUTPATIENT
Start: 2023-08-01

## 2023-08-01 NOTE — TELEPHONE ENCOUNTER
No care due was identified.  [facility] Embedded Care Due Messages. Reference number: [id number].   8/01/2023 8:39:40 AM CDT

## 2023-08-01 NOTE — TELEPHONE ENCOUNTER
Refill Decision Note   Johnathan Waters  is requesting a refill authorization.  Brief Assessment and Rationale for Refill:  Quick Discontinue     Medication Therapy Plan:  E-Prescribing Status: Receipt confirmed by pharmacy (7/29/2023  7:34 PM CDT)      Comments:     Note composed:11:10 AM 08/01/2023             Appointments     Last Visit   4/25/2023 Bernard Davalos MD   Next Visit   8/2/2023 Bernard Davalos MD           Appointments     Last Visit   4/25/2023 Bernard Davalos MD   Next Visit   8/2/2023 Bernard Davalos MD

## 2023-08-02 ENCOUNTER — OFFICE VISIT (OUTPATIENT)
Dept: FAMILY MEDICINE | Facility: CLINIC | Age: 78
End: 2023-08-02
Payer: MEDICARE

## 2023-08-02 VITALS
OXYGEN SATURATION: 97 % | HEIGHT: 67 IN | BODY MASS INDEX: 26.94 KG/M2 | HEART RATE: 61 BPM | TEMPERATURE: 98 F | SYSTOLIC BLOOD PRESSURE: 112 MMHG | DIASTOLIC BLOOD PRESSURE: 58 MMHG | WEIGHT: 171.63 LBS
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
## 2023-08-02 DIAGNOSIS — N40.1 BENIGN NON-NODULAR PROSTATIC HYPERPLASIA WITH LOWER URINARY TRACT SYMPTOMS: Chronic | ICD-10-CM

## 2023-08-02 DIAGNOSIS — E78.2 MIXED HYPERLIPIDEMIA: Chronic | ICD-10-CM

## 2023-08-02 DIAGNOSIS — I10 ESSENTIAL HYPERTENSION: Primary | Chronic | ICD-10-CM

## 2023-08-02 DIAGNOSIS — I10 ESSENTIAL HYPERTENSION: ICD-10-CM

## 2023-08-02 DIAGNOSIS — E78.49 OTHER HYPERLIPIDEMIA: ICD-10-CM

## 2023-08-02 PROCEDURE — 99999 PR PBB SHADOW E&M-EST. PATIENT-LVL IV: ICD-10-PCS | Mod: PBBFAC,,, | Performed by: INTERNAL MEDICINE

## 2023-08-02 PROCEDURE — 99999 PR PBB SHADOW E&M-EST. PATIENT-LVL IV: CPT | Mod: PBBFAC,,, | Performed by: INTERNAL MEDICINE

## 2023-08-02 PROCEDURE — 99214 OFFICE O/P EST MOD 30 MIN: CPT | Mod: PBBFAC,PO | Performed by: INTERNAL MEDICINE

## 2023-08-02 PROCEDURE — 99214 OFFICE O/P EST MOD 30 MIN: CPT | Mod: S$PBB,,, | Performed by: INTERNAL MEDICINE

## 2023-08-02 PROCEDURE — 99214 PR OFFICE/OUTPT VISIT, EST, LEVL IV, 30-39 MIN: ICD-10-PCS | Mod: S$PBB,,, | Performed by: INTERNAL MEDICINE

## 2023-08-02 RX ORDER — TAMSULOSIN HYDROCHLORIDE 0.4 MG/1
1 CAPSULE ORAL DAILY
Qty: 90 CAPSULE | Refills: 3 | Status: SHIPPED | OUTPATIENT
Start: 2023-08-02

## 2023-08-02 RX ORDER — CLINDAMYCIN PHOSPHATE 11.9 MG/ML
SOLUTION TOPICAL
COMMUNITY
Start: 2023-07-18

## 2023-08-02 RX ORDER — ATORVASTATIN CALCIUM 40 MG/1
40 TABLET, FILM COATED ORAL DAILY
Qty: 90 TABLET | Refills: 3 | Status: SHIPPED | OUTPATIENT
Start: 2023-08-02 | End: 2024-01-09 | Stop reason: SDUPTHER

## 2023-08-02 NOTE — PROGRESS NOTES
Assessment & Plan  Problem List Items Addressed This Visit          Cardiac/Vascular    Essential hypertension - Primary (Chronic)    Current Assessment & Plan     The current medical regimen is effective;  continue present plan and medications.          Relevant Orders    CBC Auto Differential    Comprehensive Metabolic Panel    Lipid Panel    Other hyperlipidemia (Chronic)    Current Assessment & Plan     The current medical regimen is effective;  continue present plan and medications.          Relevant Medications    atorvastatin (LIPITOR) 40 MG tablet       Renal/    Benign non-nodular prostatic hyperplasia with lower urinary tract symptoms (Chronic)    Current Assessment & Plan     The current medical regimen is effective;  continue present plan and medications.          Relevant Medications    tamsulosin (FLOMAX) 0.4 mg Cap     Other Visit Diagnoses       Fall, initial encounter    -  Mechanical trip and fall    Injured finger but now generally doing okay has some limited range of motion of the index finger discussed that at this point there would not be much to do if there had been a fracture as he is past the point of splinting.  Mild excoriations.  Counseled on home wound care for these              Health Maintenance reviewed, counseled on COVID boosts.    Follow-up: Follow up in about 1 year (around 8/2/2024) for follow up for chronic conditions.  ______________________________________________________________________    Chief Complaint  Chief Complaint   Patient presents with    Hypertension    Hyperlipidemia       HPI  Johnathan Waters is a 78 y.o. male with medical diagnoses as listed in the medical history and problem list that presents for HTN HLD follow up.  Pt is known to me with their last appointment 4/25/2023.      Taking and tolerating medications as prescribed without perceived side effects.  No CP, SOB, palpitations, hypoglycemic symptoms.      Generally reassuring evaluation via holter  [facility].  Has follow ups coming in the near future.       PAST MEDICAL HISTORY:  Past Medical History:   Diagnosis Date    Elevated cholesterol     Hypertension     Squamous cell carcinoma     under right eye.  Sees outside Dermatology    Trigger ring finger of right hand 1/30/2019       PAST SURGICAL HISTORY:  Past Surgical History:   Procedure Laterality Date    COLONOSCOPY N/A 9/22/2020    Procedure: COLONOSCOPY;  Surgeon: Manuela Ward MD;  Location: [facility] ENDO;  Service: Endoscopy;  Laterality: N/A;    EYE SURGERY      TRIGGER FINGER RELEASE Left 3/21/2023    Procedure: RELEASE, TRIGGER FINGER;  Surgeon: Gaby Martinez MD;  Location: [facility] OR;  Service: Orthopedics;  Laterality: Left;  RN PREOP 03/14/2023---NEED H/P    VASECTOMY         SOCIAL HISTORY:  Social History     Socioeconomic History    Marital status:    Occupational History    Occupation: Retired   Tobacco Use    Smoking status: Never    Smokeless tobacco: Never   Substance and Sexual Activity    Alcohol use: Yes     Comment: Rarely    Drug use: No    Sexual activity: Yes     Partners: Female     Comment:      Social Determinants of Health     Financial Resource Strain: Low Risk  (7/26/2023)    Overall Financial Resource Strain (CARDIA)     Difficulty of Paying Living Expenses: Not hard at all   Food Insecurity: No Food Insecurity (7/26/2023)    Hunger Vital Sign     Worried About Running Out of Food in the Last Year: Never true     Ran Out of Food in the Last Year: Never true   Transportation Needs: No Transportation Needs (7/26/2023)    PRAPARE - Transportation     Lack of Transportation (Medical): No     Lack of Transportation (Non-Medical): No   Physical Activity: Sufficiently Active (7/26/2023)    Exercise Vital Sign     Days of Exercise per Week: 5 days     Minutes of Exercise per Session: 60 min   Stress: No Stress Concern Present (7/26/2023)    [employer] [place] [employer] - Occupational Stress Questionnaire      Feeling of Stress : Not at all   Social Connections: Unknown (7/26/2023)    Social Connection and Isolation Panel [NHANES]     Frequency of Communication with Friends and Family: Twice a week     Frequency of Social Gatherings with Friends and Family: Once a week     Active Member of Clubs or Organizations: No     Attends Club or Organization Meetings: Patient refused     Marital Status:    Housing Stability: Low Risk  (7/26/2023)    Housing Stability Vital Sign     Unable to Pay for Housing in the Last Year: No     Number of Places Lived in the Last Year: 1     Unstable Housing in the Last Year: No       FAMILY HISTORY:  Family History   Problem Relation Age of Onset    Hypertension Mother     Cancer Father         lung    Diabetes Sister     Asthma Daughter     No Known Problems Daughter        ALLERGIES AND MEDICATIONS: updated and reviewed.  Review of patient's allergies indicates:   Allergen Reactions    Wasp venom Itching and Swelling    Sulfa (sulfonamide antibiotics) Other (See Comments)     Patient unsure, toddler     Current Outpatient Medications   Medication Sig Dispense Refill    aspirin (ECOTRIN) 81 MG EC tablet Take 1 tablet (81 mg total) by mouth once daily. 90 tablet 3    cetirizine (ZYRTEC) 10 MG tablet       clindamycin (CLEOCIN T) 1 % external solution APPLY TOPICALLY TO FACE TWICE DAILY AS NEEDED      lisinopriL-hydrochlorothiazide (PRINZIDE,ZESTORETIC) 10-12.5 mg per tablet TAKE 1 TABLET BY MOUTH EVERY DAY 90 tablet 2    mometasone 0.1% (ELOCON) 0.1 % cream Apply topically daily as needed (rash). 50 g 2    atorvastatin (LIPITOR) 40 MG tablet Take 1 tablet (40 mg total) by mouth once daily. 90 tablet 3    tamsulosin (FLOMAX) 0.4 mg Cap Take 1 capsule (0.4 mg total) by mouth once daily. 90 capsule 3     No current facility-administered medications for this visit.         ROS  Review of Systems   Constitutional:  Negative for chills, fever and unexpected weight change.   Respiratory:   "Negative for cough, chest tightness, shortness of breath and wheezing.    Cardiovascular:  Negative for chest pain, palpitations and leg swelling.   Gastrointestinal:  Negative for abdominal pain and blood in stool.   Genitourinary:  Negative for decreased urine volume, dysuria and hematuria.   Musculoskeletal:  Positive for arthralgias (injured finger with fall.  now resolved). Negative for myalgias.   Skin:  Positive for wound (superficial).   Neurological:  Negative for dizziness, weakness, light-headedness and headaches.   Psychiatric/Behavioral:  Negative for decreased concentration, dysphoric mood, sleep disturbance and suicidal ideas.            Physical Exam  Vitals:    08/02/23 0936   BP: (!) 112/58   BP Location: Left arm   Patient Position: Sitting   BP Method: Medium (Manual)   Pulse: 61   Temp: 98.1 °F (36.7 °C)   TempSrc: Oral   SpO2: 97%   Weight: 77.9 kg (171 lb 10.1 oz)   Height: 5' 7" (1.702 m)    Body mass index is 26.88 kg/m².  Weight: 77.9 kg (171 lb 10.1 oz)   Height: 5' 7" (170.2 cm)   Physical Exam  Constitutional:       General: He is not in acute distress.     Appearance: He is well-developed.   HENT:      Head: Normocephalic and atraumatic.   Eyes:      General: Lids are normal. No scleral icterus.     Conjunctiva/sclera: Conjunctivae normal.      Pupils: Pupils are equal, round, and reactive to light.   Neck:      Thyroid: No thyromegaly.      Vascular: No carotid bruit or JVD.   Cardiovascular:      Rate and Rhythm: Normal rate and regular rhythm.      Heart sounds: Normal heart sounds. No murmur heard.     No friction rub. No S3 or S4 sounds.   Pulmonary:      Effort: Pulmonary effort is normal.      Breath sounds: Normal breath sounds. No wheezing, rhonchi or rales.   Abdominal:      General: Bowel sounds are normal. There is no distension.      Palpations: Abdomen is soft.      Tenderness: There is no abdominal tenderness.   Musculoskeletal:         General: No tenderness.      " Cervical back: Full passive range of motion without pain and neck supple.      Right lower leg: No edema.      Left lower leg: No edema.   Skin:     General: Skin is warm and dry.      Findings: Lesion (Excoriations) present. No rash.   Neurological:      Mental Status: He is alert and oriented to person, place, and time.   Psychiatric:         Speech: Speech normal.         Behavior: Behavior normal.         Thought Content: Thought content normal.             Health Maintenance         Date Due Completion Date    COVID-19 Vaccine (5 - Moderna risk series) 12/23/2022 10/28/2022    Influenza Vaccine (1) 09/01/2023 10/13/2022    TETANUS VACCINE 12/26/2024 12/26/2014    Lipid Panel 07/26/2028 7/26/2023    Colonoscopy 09/22/2030 9/22/2020    Override on 6/1/2017: Declined (will proceed with stool cards instead)    Override on 3/3/2006: Done (normal.  In [employer] documents)

## 2023-08-02 NOTE — TELEPHONE ENCOUNTER
No care due was identified.  [facility] Embedded Care Due Messages. Reference number: [id number].   8/02/2023 6:48:16 PM CDT

## 2023-08-03 RX ORDER — LISINOPRIL AND HYDROCHLOROTHIAZIDE 10; 12.5 MG/1; MG/1
1 TABLET ORAL DAILY
Qty: 90 TABLET | Refills: 3 | Status: SHIPPED | OUTPATIENT
Start: 2023-08-03

## 2023-08-03 NOTE — TELEPHONE ENCOUNTER
Refill Decision Note   Johnathan Waters  is requesting a refill authorization.  Brief Assessment and Rationale for Refill:  Approve     Medication Therapy Plan:  RESENDING REQUEST TO [facility]; PT WAS TOLD THIS WAS CANCELED      Comments:     Note composed:10:24 AM 08/03/2023

## 2023-08-09 ENCOUNTER — OFFICE VISIT (OUTPATIENT)
Dept: CARDIOLOGY | Facility: CLINIC | Age: 78
End: 2023-08-09
Payer: MEDICARE

## 2023-08-09 VITALS
BODY MASS INDEX: 26.57 KG/M2 | SYSTOLIC BLOOD PRESSURE: 130 MMHG | DIASTOLIC BLOOD PRESSURE: 60 MMHG | HEIGHT: 67 IN | RESPIRATION RATE: 18 BRPM | OXYGEN SATURATION: 95 % | HEART RATE: 66 BPM | WEIGHT: 169.31 LBS

## 2023-08-09 DIAGNOSIS — G45.3 AMAUROSIS FUGAX OF RIGHT EYE: Primary | ICD-10-CM

## 2023-08-09 DIAGNOSIS — I65.22 CAROTID ATHEROSCLEROSIS, LEFT: ICD-10-CM

## 2023-08-09 DIAGNOSIS — E78.2 MIXED HYPERLIPIDEMIA: ICD-10-CM

## 2023-08-09 DIAGNOSIS — I10 ESSENTIAL HYPERTENSION: Chronic | ICD-10-CM

## 2023-08-09 DIAGNOSIS — I70.0 AORTIC ATHEROSCLEROSIS: ICD-10-CM

## 2023-08-09 PROCEDURE — 99999 PR PBB SHADOW E&M-EST. PATIENT-LVL IV: ICD-10-PCS | Mod: PBBFAC,,, | Performed by: INTERNAL MEDICINE

## 2023-08-09 PROCEDURE — 99999 PR PBB SHADOW E&M-EST. PATIENT-LVL IV: CPT | Mod: PBBFAC,,, | Performed by: INTERNAL MEDICINE

## 2023-08-09 PROCEDURE — 99214 OFFICE O/P EST MOD 30 MIN: CPT | Mod: PBBFAC,PO | Performed by: INTERNAL MEDICINE

## 2023-08-09 PROCEDURE — 99215 PR OFFICE/OUTPT VISIT, EST, LEVL V, 40-54 MIN: ICD-10-PCS | Mod: S$PBB,,, | Performed by: INTERNAL MEDICINE

## 2023-08-09 PROCEDURE — 99215 OFFICE O/P EST HI 40 MIN: CPT | Mod: S$PBB,,, | Performed by: INTERNAL MEDICINE

## 2023-08-09 RX ORDER — SODIUM CHLORIDE 0.9 % (FLUSH) 0.9 %
10 SYRINGE (ML) INJECTION
Status: DISCONTINUED | OUTPATIENT
Start: 2023-08-09 | End: 2023-08-10 | Stop reason: CLARIF

## 2023-08-09 NOTE — H&P (VIEW-ONLY)
CARDIOVASCULAR CONSULTATION    REASON FOR CONSULT:   Johnathan Waters is a 78 y.o. male who presents for f/u ?R eye amaurosis, testing.    PCP: Susannah  Neuro: Kalpana  HISTORY OF PRESENT ILLNESS:   The patient returns for follow up of his testing.  In the interim since his last office visit, he seen by Neurology and underwent brain imaging which was normal.  His echocardiogram with bubble study noted bubbles in the left cardiac chambers after 10 cycles.  His Holter was negative for sustained atrial arrhythmia.  The aortic ultrasound was negative for AAA.  He is had no recurrent amaurosis.  He denies any angina, dyspnea, palpitations, or syncope.  There has been no PND, orthopnea, melena, hematuria, or claudication symptoms.      I had an extensive discussion with the patient regards to the above testing results.  I have suggested we proceed with TRISH and bubble study to completely exclude PFO as a potential etiology of cryptogenic stroke/TIA.  If this is negative, I have suggested EVM/ILR to follow.    CARDIOVASCULAR HISTORY:   ?L car atherosclerosis vs tortuous vessel (CUS 5/2023)    PAST MEDICAL HISTORY:     Past Medical History:   Diagnosis Date    Elevated cholesterol     Hypertension     Squamous cell carcinoma     under right eye.  Sees outside Dermatology    Trigger ring finger of right hand 1/30/2019       PAST SURGICAL HISTORY:     Past Surgical History:   Procedure Laterality Date    COLONOSCOPY N/A 9/22/2020    Procedure: COLONOSCOPY;  Surgeon: Manuela Ward MD;  Location: [facility] ENDO;  Service: Endoscopy;  Laterality: N/A;    EYE SURGERY      TRIGGER FINGER RELEASE Left 3/21/2023    Procedure: RELEASE, TRIGGER FINGER;  Surgeon: Gaby Martinez MD;  Location: [facility] OR;  Service: Orthopedics;  Laterality: Left;  RN PREOP 03/14/2023---NEED H/P    VASECTOMY         ALLERGIES AND MEDICATION:     Review of patient's allergies indicates:   Allergen Reactions    Wasp venom Itching and Swelling     Sulfa (sulfonamide antibiotics) Other (See Comments)     Patient unsure, toddler        Medication List            Accurate as of August 9, 2023  3:25 PM. If you have any questions, ask your nurse or doctor.                CONTINUE taking these medications      aspirin 81 MG EC tablet  Commonly known as: ECOTRIN  Take 1 tablet (81 mg total) by mouth once daily.     atorvastatin 40 MG tablet  Commonly known as: LIPITOR  Take 1 tablet (40 mg total) by mouth once daily.     cetirizine 10 MG tablet  Commonly known as: ZYRTEC     clindamycin 1 % external solution  Commonly known as: CLEOCIN T     lisinopriL-hydrochlorothiazide 10-12.5 mg per tablet  Commonly known as: PRINZIDE,ZESTORETIC  Take 1 tablet by mouth once daily.     mometasone 0.1% 0.1 % cream  Commonly known as: ELOCON  Apply topically daily as needed (rash).     tamsulosin 0.4 mg Cap  Commonly known as: FLOMAX  Take 1 capsule (0.4 mg total) by mouth once daily.              SOCIAL HISTORY:     Social History     Socioeconomic History    Marital status:    Occupational History    Occupation: Retired   Tobacco Use    Smoking status: Never    Smokeless tobacco: Never   Substance and Sexual Activity    Alcohol use: Yes     Comment: Rarely    Drug use: No    Sexual activity: Yes     Partners: Female     Comment:      Social Determinants of Health     Financial Resource Strain: Low Risk  (8/2/2023)    Overall Financial Resource Strain (CARDIA)     Difficulty of Paying Living Expenses: Not hard at all   Food Insecurity: No Food Insecurity (8/2/2023)    Hunger Vital Sign     Worried About Running Out of Food in the Last Year: Never true     Ran Out of Food in the Last Year: Never true   Transportation Needs: No Transportation Needs (8/2/2023)    PRAPARE - Transportation     Lack of Transportation (Medical): No     Lack of Transportation (Non-Medical): No   Physical Activity: Sufficiently Active (8/2/2023)    Exercise Vital Sign     Days of Exercise per  Week: 5 days     Minutes of Exercise per Session: 60 min   Stress: No Stress Concern Present (8/2/2023)    [employer] [place] [employer] - Occupational Stress Questionnaire     Feeling of Stress : Not at all   Social Connections: Unknown (8/2/2023)    Social Connection and Isolation Panel [NHANES]     Frequency of Communication with Friends and Family: Twice a week     Frequency of Social Gatherings with Friends and Family: Once a week     Active Member of Clubs or Organizations: No     Attends Club or Organization Meetings: Patient refused     Marital Status:    Housing Stability: Low Risk  (8/2/2023)    Housing Stability Vital Sign     Unable to Pay for Housing in the Last Year: No     Number of Places Lived in the Last Year: 1     Unstable Housing in the Last Year: No       FAMILY HISTORY:     Family History   Problem Relation Age of Onset    Hypertension Mother     Cancer Father         lung    Diabetes Sister     Asthma Daughter     No Known Problems Daughter        REVIEW OF SYSTEMS:   Review of Systems   Constitutional:  Negative for chills, diaphoresis and fever.   HENT:  Negative for nosebleeds.    Eyes:  Negative for blurred vision, double vision and photophobia.        Brief R eye lower field visual loss   Respiratory:  Negative for hemoptysis, shortness of breath and wheezing.    Cardiovascular:  Negative for chest pain, palpitations, orthopnea, claudication, leg swelling and PND.   Gastrointestinal:  Negative for abdominal pain, blood in stool, heartburn, melena, nausea and vomiting.   Genitourinary:  Negative for flank pain and hematuria.   Musculoskeletal:  Negative for falls, myalgias and neck pain.   Skin:  Negative for rash.   Neurological:  Negative for dizziness, seizures, loss of consciousness, weakness and headaches.   Endo/Heme/Allergies:  Negative for polydipsia. Does not bruise/bleed easily.   Psychiatric/Behavioral:  Negative for depression and memory loss. The patient is  "not nervous/anxious.        PHYSICAL EXAM:     Vitals:    08/09/23 1507   BP: 130/60   Pulse: 66   Resp: 18    Body mass index is 26.52 kg/m².  Weight: 76.8 kg (169 lb 5 oz)   Height: 5' 7" (170.2 cm)     Physical Exam  Vitals reviewed.   Constitutional:       General: He is not in acute distress.     Appearance: Normal appearance. He is well-developed and normal weight. He is not ill-appearing, toxic-appearing or diaphoretic.   HENT:      Head: Normocephalic and atraumatic.   Eyes:      General: No scleral icterus.     Extraocular Movements: Extraocular movements intact.      Conjunctiva/sclera: Conjunctivae normal.      Pupils: Pupils are equal, round, and reactive to light.   Neck:      Thyroid: No thyromegaly.      Vascular: Normal carotid pulses. Carotid bruit present. No JVD.      Trachea: Trachea normal.   Cardiovascular:      Rate and Rhythm: Normal rate and regular rhythm.      Pulses:           Carotid pulses are 2+ on the right side and 2+ on the left side with bruit.     Heart sounds: S1 normal and S2 normal. Murmur heard.      Systolic murmur is present with a grade of 1/6.      No friction rub. No gallop.   Pulmonary:      Effort: Pulmonary effort is normal. No respiratory distress.      Breath sounds: Normal breath sounds. No stridor. No wheezing, rhonchi or rales.   Chest:      Chest wall: No tenderness.   Abdominal:      General: There is no distension.      Palpations: Abdomen is soft.   Musculoskeletal:         General: No swelling or tenderness. Normal range of motion.      Cervical back: Normal range of motion and neck supple. No edema or rigidity.      Right lower leg: No edema.      Left lower leg: No edema.   Feet:      Right foot:      Skin integrity: No ulcer.      Left foot:      Skin integrity: No ulcer.   Skin:     General: Skin is warm and dry.      Coloration: Skin is not jaundiced.   Neurological:      General: No focal deficit present.      Mental Status: He is alert and oriented to " person, place, and time.      Cranial Nerves: No cranial nerve deficit.   Psychiatric:         Mood and Affect: Mood normal.         Speech: Speech normal.         Behavior: Behavior normal. Behavior is cooperative.       DATA:   EKG: (personally reviewed tracing(s))  6/14/23 SR 63, PAC, PRWP, similar to 3/14/23    Laboratory:  CBC:  Recent Labs   Lab 03/14/23  0910 04/25/23  1518 07/26/23  0809   WBC 7.18 7.51 9.18   Hemoglobin 13.3 L 13.5 L 13.3 L   Hematocrit 39.2 L 42.9 41.9   Platelets 267 305 311         CHEMISTRIES:  Recent Labs   Lab 01/04/21  1648 07/24/21  0826 07/29/22  0821 03/14/23  0910 07/26/23  0809   Glucose 131 H 92 92 96 88   Sodium 141 140 140 141 141   Potassium 3.6 4.0 4.1 3.4 L 4.2   BUN 16 16 16 20 21   Creatinine 1.0 1.1 1.0 1.1 1.1   eGFR if non African American >60.0 >60.0 >60.0  --   --    eGFR  --   --   --  >60 >60.0   Calcium 9.2 9.7 9.1 8.8 9.1         CARDIAC BIOMARKERS:        COAGS:  Recent Labs   Lab 04/25/23  1518   INR 1.0         LIPIDS/LFTS:  Recent Labs   Lab 07/24/21  0826 07/29/22  0821 03/14/23  0910 07/26/23  0809   Cholesterol 172 171  --  109 L   Triglycerides 68 97  --  52   HDL 59 54  --  50   LDL Cholesterol 99.4 97.6  --  48.6 L   Non-HDL Cholesterol 113 117  --  59   AST 17 17 14 18   ALT 25 25 17 29         Cardiovascular Testing:  Echo 7/20/23  The left ventricle is normal in size with normal systolic function.  The estimated ejection fraction is 55%.  Normal right ventricular size with normal right ventricular systolic function.  Mild mitral regurgitation.  The estimated PA systolic pressure is 34 mmHg.  Saline contrast study notable for bubbles in left heart chambers after 10 cardiac cycles suggesting transpulmonary shunt.  No intracardiac source of embolus noted on this exam. If high clinical suspicion, consider TRISH +/- bubble study.    Aortic US 7/20/23  Suprarenal aortic ectasia without evidence of AAA, maximum diameter 2.7 cm.    Aortic  atherosclerosis.    Holter 7/20/23  NSR. Heart rates varied between 39 and 92 BPM with an average of 55 BPM.  There were rare PVCs totalling 404 and averaging 8.42 per hour. There were 2 couplets  There were very frequent PACs totalling 6207 and averaging 129.31 per hour    Carotid US 5/18/23  There is 0-19% right Internal Carotid Stenosis.  There is 40-49% left Internal Carotid Stenosis. Vessel tortuosity in area of increased velocity (may artifactually suggest stenosis).    Ex stress echo 8/20/18  The patient exercised for 10.5 minutes, corresponding to a functional capacity of 12 estimated METS, achieving a peak heart rate of 142 bpm, which is 97% of the age predicted maximum heart rate.   There were no significant electrocardiographic changes throughout the protocol suggesting ischemia.   EKG Conclusions:   1. The EKG portion of this study is negative for ischemia at a high workload, and peak heart rate of 142 bpm (97% of predicted).   2. Blood pressure response to exercise was normal (Presenting BP: 139/65 Peak BP: 187/82).   3. No significant arrhythmias were present.   4. There were no symptoms of chest discomfort or significant dyspnea throughout the protocol.   Post Exercise Imaging:   Immediate post exercise images demonstrate left ventricular function augmenting.   No exercise induced wall motion abnormalities were identified.   CONCLUSIONS     1 - Normal left ventricular systolic function (EF 55-60%).     2 - Concentric remodeling.     3 - Mild left atrial enlargement.   No evidence of stress induced myocardial ischemia.       ASSESSMENT:   # ?amaurosis R eye, seems atypical.  Neg optho exam.  Sxs abated.  # HTN, controlled  # HLP on atorva 40mg  # ?L carotid atherosclerosis (CUS 5/2023 with ?tortuosity), L carotid bruit  # aortic atherosclerosis (aortic US 7/20/23)    PLAN:   Cont med rx  Cont ASA 81mg qd  TRISH with bubble study 8/14/23  RTC 1 month.  If neg TRISH, will plan EVM/ILR.  Surveillance carotid   6 months (Dec 2023)    Risks, benefits and alternatives of the TRISH procedure were discussed with the patient including throat irritation, aspiration, anesthetic complications, esophageal irritation/perforation, etc.  Patient understands and agrees to proceed.  Permit signed.      Luis Antonio Small MD, FACC

## 2023-08-09 NOTE — PROGRESS NOTES
CARDIOVASCULAR CONSULTATION    REASON FOR CONSULT:   Johnathan Waters is a 78 y.o. male who presents for f/u ?R eye amaurosis, testing.    PCP: Susannah  Neuro: Kalpana  HISTORY OF PRESENT ILLNESS:   The patient returns for follow up of his testing.  In the interim since his last office visit, he seen by Neurology and underwent brain imaging which was normal.  His echocardiogram with bubble study noted bubbles in the left cardiac chambers after 10 cycles.  His Holter was negative for sustained atrial arrhythmia.  The aortic ultrasound was negative for AAA.  He is had no recurrent amaurosis.  He denies any angina, dyspnea, palpitations, or syncope.  There has been no PND, orthopnea, melena, hematuria, or claudication symptoms.      I had an extensive discussion with the patient regards to the above testing results.  I have suggested we proceed with TRISH and bubble study to completely exclude PFO as a potential etiology of cryptogenic stroke/TIA.  If this is negative, I have suggested EVM/ILR to follow.    CARDIOVASCULAR HISTORY:   ?L car atherosclerosis vs tortuous vessel (CUS 5/2023)    PAST MEDICAL HISTORY:     Past Medical History:   Diagnosis Date    Elevated cholesterol     Hypertension     Squamous cell carcinoma     under right eye.  Sees outside Dermatology    Trigger ring finger of right hand 1/30/2019       PAST SURGICAL HISTORY:     Past Surgical History:   Procedure Laterality Date    COLONOSCOPY N/A 9/22/2020    Procedure: COLONOSCOPY;  Surgeon: Manuela Ward MD;  Location: [facility] ENDO;  Service: Endoscopy;  Laterality: N/A;    EYE SURGERY      TRIGGER FINGER RELEASE Left 3/21/2023    Procedure: RELEASE, TRIGGER FINGER;  Surgeon: Gaby Martinez MD;  Location: [facility] OR;  Service: Orthopedics;  Laterality: Left;  RN PREOP 03/14/2023---NEED H/P    VASECTOMY         ALLERGIES AND MEDICATION:     Review of patient's allergies indicates:   Allergen Reactions    Wasp venom Itching and Swelling     Sulfa (sulfonamide antibiotics) Other (See Comments)     Patient unsure, toddler        Medication List            Accurate as of August 9, 2023  3:25 PM. If you have any questions, ask your nurse or doctor.                CONTINUE taking these medications      aspirin 81 MG EC tablet  Commonly known as: ECOTRIN  Take 1 tablet (81 mg total) by mouth once daily.     atorvastatin 40 MG tablet  Commonly known as: LIPITOR  Take 1 tablet (40 mg total) by mouth once daily.     cetirizine 10 MG tablet  Commonly known as: ZYRTEC     clindamycin 1 % external solution  Commonly known as: CLEOCIN T     lisinopriL-hydrochlorothiazide 10-12.5 mg per tablet  Commonly known as: PRINZIDE,ZESTORETIC  Take 1 tablet by mouth once daily.     mometasone 0.1% 0.1 % cream  Commonly known as: ELOCON  Apply topically daily as needed (rash).     tamsulosin 0.4 mg Cap  Commonly known as: FLOMAX  Take 1 capsule (0.4 mg total) by mouth once daily.              SOCIAL HISTORY:     Social History     Socioeconomic History    Marital status:    Occupational History    Occupation: Retired   Tobacco Use    Smoking status: Never    Smokeless tobacco: Never   Substance and Sexual Activity    Alcohol use: Yes     Comment: Rarely    Drug use: No    Sexual activity: Yes     Partners: Female     Comment:      Social Determinants of Health     Financial Resource Strain: Low Risk  (8/2/2023)    Overall Financial Resource Strain (CARDIA)     Difficulty of Paying Living Expenses: Not hard at all   Food Insecurity: No Food Insecurity (8/2/2023)    Hunger Vital Sign     Worried About Running Out of Food in the Last Year: Never true     Ran Out of Food in the Last Year: Never true   Transportation Needs: No Transportation Needs (8/2/2023)    PRAPARE - Transportation     Lack of Transportation (Medical): No     Lack of Transportation (Non-Medical): No   Physical Activity: Sufficiently Active (8/2/2023)    Exercise Vital Sign     Days of Exercise per  Week: 5 days     Minutes of Exercise per Session: 60 min   Stress: No Stress Concern Present (8/2/2023)    [employer] - Occupational Stress Questionnaire     Feeling of Stress : Not at all   Social Connections: Unknown (8/2/2023)    Social Connection and Isolation Panel [NHANES]     Frequency of Communication with Friends and Family: Twice a week     Frequency of Social Gatherings with Friends and Family: Once a week     Active Member of Clubs or Organizations: No     Attends Club or Organization Meetings: Patient refused     Marital Status:    Housing Stability: Low Risk  (8/2/2023)    Housing Stability Vital Sign     Unable to Pay for Housing in the Last Year: No     Number of Places Lived in the Last Year: 1     Unstable Housing in the Last Year: No       FAMILY HISTORY:     Family History   Problem Relation Age of Onset    Hypertension Mother     Cancer Father         lung    Diabetes Sister     Asthma Daughter     No Known Problems Daughter        REVIEW OF SYSTEMS:   Review of Systems   Constitutional:  Negative for chills, diaphoresis and fever.   HENT:  Negative for nosebleeds.    Eyes:  Negative for blurred vision, double vision and photophobia.        Brief R eye lower field visual loss   Respiratory:  Negative for hemoptysis, shortness of breath and wheezing.    Cardiovascular:  Negative for chest pain, palpitations, orthopnea, claudication, leg swelling and PND.   Gastrointestinal:  Negative for abdominal pain, blood in stool, heartburn, melena, nausea and vomiting.   Genitourinary:  Negative for flank pain and hematuria.   Musculoskeletal:  Negative for falls, myalgias and neck pain.   Skin:  Negative for rash.   Neurological:  Negative for dizziness, seizures, loss of consciousness, weakness and headaches.   Endo/Heme/Allergies:  Negative for polydipsia. Does not bruise/bleed easily.   Psychiatric/Behavioral:  Negative for depression and memory loss. The patient is  "not nervous/anxious.        PHYSICAL EXAM:     Vitals:    08/09/23 1507   BP: 130/60   Pulse: 66   Resp: 18    Body mass index is 26.52 kg/m².  Weight: 76.8 kg (169 lb 5 oz)   Height: 5' 7" (170.2 cm)     Physical Exam  Vitals reviewed.   Constitutional:       General: He is not in acute distress.     Appearance: Normal appearance. He is well-developed and normal weight. He is not ill-appearing, toxic-appearing or diaphoretic.   HENT:      Head: Normocephalic and atraumatic.   Eyes:      General: No scleral icterus.     Extraocular Movements: Extraocular movements intact.      Conjunctiva/sclera: Conjunctivae normal.      Pupils: Pupils are equal, round, and reactive to light.   Neck:      Thyroid: No thyromegaly.      Vascular: Normal carotid pulses. Carotid bruit present. No JVD.      Trachea: Trachea normal.   Cardiovascular:      Rate and Rhythm: Normal rate and regular rhythm.      Pulses:           Carotid pulses are 2+ on the right side and 2+ on the left side with bruit.     Heart sounds: S1 normal and S2 normal. Murmur heard.      Systolic murmur is present with a grade of 1/6.      No friction rub. No gallop.   Pulmonary:      Effort: Pulmonary effort is normal. No respiratory distress.      Breath sounds: Normal breath sounds. No stridor. No wheezing, rhonchi or rales.   Chest:      Chest wall: No tenderness.   Abdominal:      General: There is no distension.      Palpations: Abdomen is soft.   Musculoskeletal:         General: No swelling or tenderness. Normal range of motion.      Cervical back: Normal range of motion and neck supple. No edema or rigidity.      Right lower leg: No edema.      Left lower leg: No edema.   Feet:      Right foot:      Skin integrity: No ulcer.      Left foot:      Skin integrity: No ulcer.   Skin:     General: Skin is warm and dry.      Coloration: Skin is not jaundiced.   Neurological:      General: No focal deficit present.      Mental Status: He is alert and oriented to " person, place, and time.      Cranial Nerves: No cranial nerve deficit.   Psychiatric:         Mood and Affect: Mood normal.         Speech: Speech normal.         Behavior: Behavior normal. Behavior is cooperative.       DATA:   EKG: (personally reviewed tracing(s))  6/14/23 SR 63, PAC, PRWP, similar to 3/14/23    Laboratory:  CBC:  Recent Labs   Lab 03/14/23  0910 04/25/23  1518 07/26/23  0809   WBC 7.18 7.51 9.18   Hemoglobin 13.3 L 13.5 L 13.3 L   Hematocrit 39.2 L 42.9 41.9   Platelets 267 305 311         CHEMISTRIES:  Recent Labs   Lab 01/04/21  1648 07/24/21  0826 07/29/22  0821 03/14/23  0910 07/26/23  0809   Glucose 131 H 92 92 96 88   Sodium 141 140 140 141 141   Potassium 3.6 4.0 4.1 3.4 L 4.2   BUN 16 16 16 20 21   Creatinine 1.0 1.1 1.0 1.1 1.1   eGFR if non African American >60.0 >60.0 >60.0  --   --    eGFR  --   --   --  >60 >60.0   Calcium 9.2 9.7 9.1 8.8 9.1         CARDIAC BIOMARKERS:        COAGS:  Recent Labs   Lab 04/25/23  1518   INR 1.0         LIPIDS/LFTS:  Recent Labs   Lab 07/24/21  0826 07/29/22  0821 03/14/23  0910 07/26/23  0809   Cholesterol 172 171  --  109 L   Triglycerides 68 97  --  52   HDL 59 54  --  50   LDL Cholesterol 99.4 97.6  --  48.6 L   Non-HDL Cholesterol 113 117  --  59   AST 17 17 14 18   ALT 25 25 17 29         Cardiovascular Testing:  Echo 7/20/23  The left ventricle is normal in size with normal systolic function.  The estimated ejection fraction is 55%.  Normal right ventricular size with normal right ventricular systolic function.  Mild mitral regurgitation.  The estimated PA systolic pressure is 34 mmHg.  Saline contrast study notable for bubbles in left heart chambers after 10 cardiac cycles suggesting transpulmonary shunt.  No intracardiac source of embolus noted on this exam. If high clinical suspicion, consider TRISH +/- bubble study.    Aortic US 7/20/23  Suprarenal aortic ectasia without evidence of AAA, maximum diameter 2.7 cm.    Aortic  atherosclerosis.    Holter 7/20/23  NSR. Heart rates varied between 39 and 92 BPM with an average of 55 BPM.  There were rare PVCs totalling 404 and averaging 8.42 per hour. There were 2 couplets  There were very frequent PACs totalling 6207 and averaging 129.31 per hour    Carotid US 5/18/23  There is 0-19% right Internal Carotid Stenosis.  There is 40-49% left Internal Carotid Stenosis. Vessel tortuosity in area of increased velocity (may artifactually suggest stenosis).    Ex stress echo 8/20/18  The patient exercised for 10.5 minutes, corresponding to a functional capacity of 12 estimated METS, achieving a peak heart rate of 142 bpm, which is 97% of the age predicted maximum heart rate.   There were no significant electrocardiographic changes throughout the protocol suggesting ischemia.   EKG Conclusions:   1. The EKG portion of this study is negative for ischemia at a high workload, and peak heart rate of 142 bpm (97% of predicted).   2. Blood pressure response to exercise was normal (Presenting BP: 139/65 Peak BP: 187/82).   3. No significant arrhythmias were present.   4. There were no symptoms of chest discomfort or significant dyspnea throughout the protocol.   Post Exercise Imaging:   Immediate post exercise images demonstrate left ventricular function augmenting.   No exercise induced wall motion abnormalities were identified.   CONCLUSIONS     1 - Normal left ventricular systolic function (EF 55-60%).     2 - Concentric remodeling.     3 - Mild left atrial enlargement.   No evidence of stress induced myocardial ischemia.       ASSESSMENT:   # ?amaurosis R eye, seems atypical.  Neg optho exam.  Sxs abated.  # HTN, controlled  # HLP on atorva 40mg  # ?L carotid atherosclerosis (CUS 5/2023 with ?tortuosity), L carotid bruit  # aortic atherosclerosis (aortic US 7/20/23)    PLAN:   Cont med rx  Cont ASA 81mg qd  TRISH with bubble study 8/14/23  RTC 1 month.  If neg TRISH, will plan EVM/ILR.  Surveillance carotid   6 months (Dec 2023)    Risks, benefits and alternatives of the TRISH procedure were discussed with the patient including throat irritation, aspiration, anesthetic complications, esophageal irritation/perforation, etc.  Patient understands and agrees to proceed.  Permit signed.      Luis Antonio Small MD, FACC

## 2023-08-10 ENCOUNTER — HOSPITAL ENCOUNTER (OUTPATIENT)
Dept: PREADMISSION TESTING | Facility: HOSPITAL | Age: 78
Discharge: HOME | End: 2023-08-10
Attending: CLINICAL NURSE SPECIALIST
Payer: MEDICARE

## 2023-08-10 ENCOUNTER — TELEPHONE (OUTPATIENT)
Dept: FAMILY MEDICINE | Facility: CLINIC | Age: 78
End: 2023-08-10
Payer: MEDICARE

## 2023-08-10 VITALS
HEIGHT: 67 IN | HEART RATE: 59 BPM | DIASTOLIC BLOOD PRESSURE: 67 MMHG | SYSTOLIC BLOOD PRESSURE: 121 MMHG | OXYGEN SATURATION: 97 % | TEMPERATURE: 98 F | BODY MASS INDEX: 26.61 KG/M2 | WEIGHT: 169.56 LBS | RESPIRATION RATE: 18 BRPM

## 2023-08-10 DIAGNOSIS — Z79.01 ANTICOAGULANT LONG-TERM USE: ICD-10-CM

## 2023-08-10 DIAGNOSIS — Z01.818 PREOPERATIVE TESTING: Primary | ICD-10-CM

## 2023-08-10 LAB
INR PPP: 1 (ref 0.8–1.2)
PROTHROMBIN TIME: 10.8 SEC (ref 9–12.5)

## 2023-08-10 PROCEDURE — 85610 PROTHROMBIN TIME: CPT | Performed by: INTERNAL MEDICINE

## 2023-08-10 NOTE — DISCHARGE INSTRUCTIONS
Before 7 AM, enter through the Emergency Entrance..   After 7 AM enter through the Main Entrance.      Your procedure  is scheduled for __8/14/2023________.    Call [phone] between 2pm and 5pm on ___8/11/2023____to find out your arrival time for the day of surgery.    You may have two visitors.  No children under 12 years old.     You will be going to the Same Day Surgery Unit on the 2nd floor of the hospital.    Important instructions:  Do not eat anything after midnight.  You may have plain water, non carbonated.  You may also have Gatorade or Powerade after midnight.    Stop all fluids 2 hours before your surgery.    It is okay to brush your teeth.  Do not have gum, candy or mints.    SEE MEDICATION SHEET.   TAKE MEDICATIONS AS DIRECTED WITH SIPS OF WATER.    Please shower the night before and the morning of your surgery.      Contact lenses and removable denture work may not be worn during your procedure.    You may wear deodorant only. If you are having breast surgery, do not wear deodorant on the operative side.    Do not wear powder, body lotion, perfume/cologne or make-up.    Do not wear any jewelry or have any metal on your body.    You will be asked to remove any dentures or partials for the procedure.    If you are going home on the same day of surgery, you must arrange for a family member or a friend to drive you home.  Public transportation is prohibited.  You will not be able to drive home if you were given anesthesia or sedation.    Patients who want to have their Post-op prescriptions filled from our in-house [facility], bring a Credit/Debit Card or cash with you. A co-pay may be required.  The pharmacy closes at 5:30 pm.    Wear loose fitting clothes allowing for bandages.    Please leave money and valuables home.      You may bring your cell phone.    Call the doctor if fever or illness should occur before your surgery.    Call [phone] to contact us here if needed.                             CLOTHES ON DAY OF SURGERY    SHOULDER surgery:  you must have a very oversized shirt.  Very, Very large.  You will probably have a large sling on with your arm strapped to your chest.  You will not be able to put the arm of the operated shoulder into a sleeve.  You can put the arm of the un-operated shoulder into the sleeve, but the shirt will need to be draped over the operated shoulder.       ARM or HAND surgery:  make sure that your sleeves are large and loose enough to pass over large dressings or cast.      BREAST or UNDERARM surgery:  wear a loose, button down shirt so that you can dress without raising your arms over your head.    ABDOMINAL surgery:  wear loose, comfortable clothing.  Nothing tight around the abdomen.  NO JEANS    PENIS or SCROTAL surgery:  loose comfortable clothing.  Large sweat pants, pajama pants or a robe.  ABSOLUTELY NO JEANS      LEG or FOOT surgery:  wear large loose pants that are able to pass over any large dressings or casts.  You could also wear loose shorts or a skirt.

## 2023-08-10 NOTE — TELEPHONE ENCOUNTER
Spoke with the Patient and scheduled an EAWV appointment for 08/17/23 @ 10:00am  Patient verbally understands.

## 2023-08-14 ENCOUNTER — HOSPITAL ENCOUNTER (OUTPATIENT)
Facility: HOSPITAL | Age: 78
Discharge: HOME | End: 2023-08-14
Attending: INTERNAL MEDICINE | Admitting: INTERNAL MEDICINE
Payer: MEDICARE

## 2023-08-14 ENCOUNTER — HOSPITAL ENCOUNTER (OUTPATIENT)
Dept: CARDIOLOGY | Facility: HOSPITAL | Age: 78
Discharge: HOME | End: 2023-08-14
Attending: INTERNAL MEDICINE
Payer: MEDICARE

## 2023-08-14 ENCOUNTER — ANESTHESIA (OUTPATIENT)
Dept: CARDIOLOGY | Facility: HOSPITAL | Age: 78
End: 2023-08-14
Payer: MEDICARE

## 2023-08-14 ENCOUNTER — ANESTHESIA EVENT (OUTPATIENT)
Dept: CARDIOLOGY | Facility: HOSPITAL | Age: 78
End: 2023-08-14
Payer: MEDICARE

## 2023-08-14 VITALS
BODY MASS INDEX: 26.55 KG/M2 | HEART RATE: 52 BPM | OXYGEN SATURATION: 98 % | DIASTOLIC BLOOD PRESSURE: 59 MMHG | TEMPERATURE: 98 F | RESPIRATION RATE: 19 BRPM | SYSTOLIC BLOOD PRESSURE: 122 MMHG | WEIGHT: 169.5 LBS

## 2023-08-14 VITALS — WEIGHT: 169 LBS | HEIGHT: 67 IN | BODY MASS INDEX: 26.53 KG/M2

## 2023-08-14 DIAGNOSIS — G45.3 AMAUROSIS FUGAX OF RIGHT EYE: ICD-10-CM

## 2023-08-14 LAB
BSA FOR ECHO PROCEDURE: 1.9 M2
SINUS: 2.79 CM

## 2023-08-14 PROCEDURE — 93312 ECHO TRANSESOPHAGEAL: CPT | Mod: 26,,, | Performed by: INTERNAL MEDICINE

## 2023-08-14 PROCEDURE — D9220A PRA ANESTHESIA: Mod: CRNA,,, | Performed by: STUDENT IN AN ORGANIZED HEALTH CARE EDUCATION/TRAINING PROGRAM

## 2023-08-14 PROCEDURE — D9220A PRA ANESTHESIA: ICD-10-PCS | Mod: CRNA,,, | Performed by: STUDENT IN AN ORGANIZED HEALTH CARE EDUCATION/TRAINING PROGRAM

## 2023-08-14 PROCEDURE — 93325 DOPPLER ECHO COLOR FLOW MAPG: CPT | Mod: 26,,, | Performed by: INTERNAL MEDICINE

## 2023-08-14 PROCEDURE — 93325 PR DOPPLER COLOR FLOW VELOCITY MAP: ICD-10-PCS | Mod: 26,,, | Performed by: INTERNAL MEDICINE

## 2023-08-14 PROCEDURE — 63600175 PHARM REV CODE 636 W HCPCS: Performed by: STUDENT IN AN ORGANIZED HEALTH CARE EDUCATION/TRAINING PROGRAM

## 2023-08-14 PROCEDURE — 93312 TRANSESOPHAGEAL ECHO (TEE) (CUPID ONLY): ICD-10-PCS | Mod: 26,,, | Performed by: INTERNAL MEDICINE

## 2023-08-14 PROCEDURE — 93312 ECHO TRANSESOPHAGEAL: CPT

## 2023-08-14 PROCEDURE — 25000003 PHARM REV CODE 250: Performed by: STUDENT IN AN ORGANIZED HEALTH CARE EDUCATION/TRAINING PROGRAM

## 2023-08-14 PROCEDURE — D9220A PRA ANESTHESIA: Mod: ANES,,, | Performed by: ANESTHESIOLOGY

## 2023-08-14 PROCEDURE — 37000008 HC ANESTHESIA 1ST 15 MINUTES: Performed by: INTERNAL MEDICINE

## 2023-08-14 PROCEDURE — D9220A PRA ANESTHESIA: ICD-10-PCS | Mod: ANES,,, | Performed by: ANESTHESIOLOGY

## 2023-08-14 PROCEDURE — 37000009 HC ANESTHESIA EA ADD 15 MINS: Performed by: INTERNAL MEDICINE

## 2023-08-14 RX ORDER — LIDOCAINE HYDROCHLORIDE 20 MG/ML
INJECTION INTRAVENOUS
Status: DISCONTINUED | OUTPATIENT
Start: 2023-08-14 | End: 2023-08-14

## 2023-08-14 RX ORDER — PROPOFOL 10 MG/ML
VIAL (ML) INTRAVENOUS
Status: DISCONTINUED | OUTPATIENT
Start: 2023-08-14 | End: 2023-08-14

## 2023-08-14 RX ADMIN — PROPOFOL 50 MG: 10 INJECTION, EMULSION INTRAVENOUS at 10:08

## 2023-08-14 RX ADMIN — PROPOFOL 20 MG: 10 INJECTION, EMULSION INTRAVENOUS at 11:08

## 2023-08-14 RX ADMIN — LIDOCAINE HYDROCHLORIDE 100 MG: 20 INJECTION, SOLUTION INTRAVENOUS at 10:08

## 2023-08-14 RX ADMIN — PROPOFOL 70 MG: 10 INJECTION, EMULSION INTRAVENOUS at 10:08

## 2023-08-14 RX ADMIN — SODIUM CHLORIDE, SODIUM LACTATE, POTASSIUM CHLORIDE, AND CALCIUM CHLORIDE: .6; .31; .03; .02 INJECTION, SOLUTION INTRAVENOUS at 10:08

## 2023-08-14 RX ADMIN — PROPOFOL 30 MG: 10 INJECTION, EMULSION INTRAVENOUS at 10:08

## 2023-08-14 NOTE — DISCHARGE SUMMARY
[facility] Lab  Discharge Note    SUMMARY     Admit Date: 8/14/2023    Discharge Date and Time:  08/14/2023 1PM    Hospital Course (synopsis of major diagnoses, care, treatment, and services provided during the course of the hospital stay): uneventful TRISH/bubble study with anesthesia.  Normal TRISH, neg bubble study.     Final Diagnosis: Post-Op Diagnosis Codes:     * Amaurosis fugax of right eye [G45.3]    Disposition: Home or Self Care    Follow Up/Patient Instructions:     Medications:  Reconciled Home Medications:      Medication List        CONTINUE taking these medications      aspirin 81 MG EC tablet  Commonly known as: ECOTRIN  Take 1 tablet (81 mg total) by mouth once daily.     atorvastatin 40 MG tablet  Commonly known as: LIPITOR  Take 1 tablet (40 mg total) by mouth once daily.     cetirizine 10 MG tablet  Commonly known as: ZYRTEC     clindamycin 1 % external solution  Commonly known as: CLEOCIN T  APPLY TOPICALLY TO FACE TWICE DAILY AS NEEDED     lisinopriL-hydrochlorothiazide 10-12.5 mg per tablet  Commonly known as: PRINZIDE,ZESTORETIC  Take 1 tablet by mouth once daily.     mometasone 0.1% 0.1 % cream  Commonly known as: ELOCON  Apply topically daily as needed (rash).     tamsulosin 0.4 mg Cap  Commonly known as: FLOMAX  Take 1 capsule (0.4 mg total) by mouth once daily.            No discharge procedures on file.   Follow-up Information       Luis Antonio Small MD Follow up on 9/6/2023.    Specialties: Cardiology, Interventional Cardiology  Why: 2pm, for planned follow up appointment  Contact information:  [address] [place] [address]  Kennedy [place]ZANARES [address] [phone]                               Diet: cardiac    Activity: ad naima.

## 2023-08-14 NOTE — ANESTHESIA PREPROCEDURE EVALUATION
08/14/2023  Johnathan Waters is a 78 y.o., male.  To undergo Procedure(s) (LRB):  Transesophageal echo (TRISH) intra-procedure log documentation (N/A)     Denies CP/SOB/GERD/MI/CVA/URI symptoms.  METS > 4  NPO > 8    Past Medical History:  Past Medical History:   Diagnosis Date    Elevated cholesterol     Hypertension     Squamous cell carcinoma     under right eye.  Sees outside Dermatology    Trigger ring finger of right hand 1/30/2019       Past Surgical History:  Past Surgical History:   Procedure Laterality Date    COLONOSCOPY N/A 9/22/2020    Procedure: COLONOSCOPY;  Surgeon: Manuela Ward MD;  Location: [facility] ENDO;  Service: Endoscopy;  Laterality: N/A;    EYE SURGERY      TRIGGER FINGER RELEASE Left 3/21/2023    Procedure: RELEASE, TRIGGER FINGER;  Surgeon: Gaby Martinez MD;  Location: [facility] OR;  Service: Orthopedics;  Laterality: Left;  RN PREOP 03/14/2023---NEED H/P    VASECTOMY         Social History:  Social History     Socioeconomic History    Marital status:    Occupational History    Occupation: Retired   Tobacco Use    Smoking status: Never    Smokeless tobacco: Never   Substance and Sexual Activity    Alcohol use: Yes     Comment: Rarely    Drug use: No    Sexual activity: Yes     Partners: Female     Comment:      Social Determinants of Health     Financial Resource Strain: Low Risk  (8/2/2023)    Overall Financial Resource Strain (CARDIA)     Difficulty of Paying Living Expenses: Not hard at all   Food Insecurity: No Food Insecurity (8/2/2023)    Hunger Vital Sign     Worried About Running Out of Food in the Last Year: Never true     Ran Out of Food in the Last Year: Never true   Transportation Needs: No Transportation Needs (8/2/2023)    PRAPARE - Transportation     Lack of Transportation (Medical): No     Lack of Transportation (Non-Medical): No    Physical Activity: Sufficiently Active (8/2/2023)    Exercise Vital Sign     Days of Exercise per Week: 5 days     Minutes of Exercise per Session: 60 min   Stress: No Stress Concern Present (8/2/2023)    [employer] - Occupational Stress Questionnaire     Feeling of Stress : Not at all   Social Connections: Unknown (8/2/2023)    Social Connection and Isolation Panel [NHANES]     Frequency of Communication with Friends and Family: Twice a week     Frequency of Social Gatherings with Friends and Family: Once a week     Active Member of Clubs or Organizations: No     Attends Club or Organization Meetings: Patient refused     Marital Status:    Housing Stability: Low Risk  (8/2/2023)    Housing Stability Vital Sign     Unable to Pay for Housing in the Last Year: No     Number of Places Lived in the Last Year: 1     Unstable Housing in the Last Year: No       Medications:  No current facility-administered medications on file prior to encounter.     Current Outpatient Medications on File Prior to Encounter   Medication Sig Dispense Refill    aspirin (ECOTRIN) 81 MG EC tablet Take 1 tablet (81 mg total) by mouth once daily. 90 tablet 3    atorvastatin (LIPITOR) 40 MG tablet Take 1 tablet (40 mg total) by mouth once daily. 90 tablet 3    cetirizine (ZYRTEC) 10 MG tablet       clindamycin (CLEOCIN T) 1 % external solution APPLY TOPICALLY TO FACE TWICE DAILY AS NEEDED      lisinopriL-hydrochlorothiazide (PRINZIDE,ZESTORETIC) 10-12.5 mg per tablet Take 1 tablet by mouth once daily. 90 tablet 3    mometasone 0.1% (ELOCON) 0.1 % cream Apply topically daily as needed (rash). 50 g 2    tamsulosin (FLOMAX) 0.4 mg Cap Take 1 capsule (0.4 mg total) by mouth once daily. 90 capsule 3       Allergies:  Review of patient's allergies indicates:   Allergen Reactions    Wasp venom Itching and Swelling    Sulfa (sulfonamide antibiotics) Other (See Comments)     Patient unsure,  toddler       Active Problems:  Patient Active Problem List   Diagnosis    Essential hypertension    Other hyperlipidemia    Benign non-nodular prostatic hyperplasia with lower urinary tract symptoms    Trigger ring finger of right hand    Acquired hallux rigidus of left foot    Trigger finger, left ring finger    Amaurosis fugax of right eye    Aortic atherosclerosis       Diagnostic Studies:   Latest Reference Range & Units 07/26/23 08:09   WBC 3.90 - 12.70 K/uL 9.18   RBC 4.60 - 6.20 M/uL 4.63   Hemoglobin 14.0 - 18.0 g/dL 13.3 (L)   Hematocrit 40.0 - 54.0 % 41.9   MCV 82 - 98 fL 91   MCH 27.0 - 31.0 pg 28.7   MCHC 32.0 - 36.0 g/dL 31.7 (L)   RDW 11.5 - 14.5 % 13.5   Platelets 150 - 450 K/uL 311   MPV 9.2 - 12.9 fL 12.1      Latest Reference Range & Units 07/26/23 08:09   Sodium 136 - 145 mmol/L 141   Potassium 3.5 - 5.1 mmol/L 4.2   Chloride 95 - 110 mmol/L 103   CO2 23 - 29 mmol/L 30 (H)   Anion Gap 8 - 16 mmol/L 8   BUN 8 - 23 mg/dL 21   Creatinine 0.5 - 1.4 mg/dL 1.1   eGFR >60 mL/min/1.73 m^2 >60.0     EKG (6/14/23):  Sinus rhythm with Premature supraventricular complexes   Cannot rule out Anterior infarct ,age undetermined     TTE (7/20/23):   The left ventricle is normal in size with normal systolic function.   The estimated ejection fraction is 55%.   Normal right ventricular size with normal right ventricular systolic function.   Mild mitral regurgitation.   The estimated PA systolic pressure is 34 mmHg.   Saline contrast study notable for bubbles in left heart chambers after 10 cardiac cycles suggesting transpulmonary shunt.   No intracardiac source of embolus noted on this exam. If high clinical suspicion, consider TRISH +/- bubble study.    24 Hour Vitals:      See Nursing Charting For Additional Vitals      Pre-op Assessment    I have reviewed the Patient Summary Reports.     I have reviewed the Nursing Notes.       Review of Systems  Anesthesia Hx:  No problems with previous Anesthesia    Denies Personal Hx of Anesthesia complications.   Social:  Non-Smoker, Social Alcohol Use    EENT/Dental:   Amaurosis Fugax R eye   Cardiovascular:   Exercise tolerance: good Hypertension hyperlipidemia ECG has been reviewed.    Pulmonary:  Pulmonary Normal    Renal/:   BPH    Hepatic/GI:  Hepatic/GI Normal    Neurological:   TIA,    Endocrine:  Endocrine Normal        Physical Exam  General: Well nourished and Cooperative    Airway:  Mallampati: II   Mouth Opening: Normal  TM Distance: Normal      Dental:  Intact    Chest/Lungs:  Clear to auscultation, Normal Respiratory Rate    Heart:  Rate: Normal  Rhythm: Regular Rhythm        Anesthesia Plan  Type of Anesthesia, risks & benefits discussed:    Anesthesia Type: Gen Natural Airway, MAC, Gen ETT  Intra-op Monitoring Plan: Standard ASA Monitors  Post Op Pain Control Plan: multimodal analgesia  Induction:  IV  Informed Consent: Informed consent signed with the Patient and all parties understand the risks and agree with anesthesia plan.  All questions answered.   ASA Score: 2    Ready For Surgery From Anesthesia Perspective.     .

## 2023-08-14 NOTE — ANESTHESIA POSTPROCEDURE EVALUATION
Anesthesia Post Evaluation    Patient: Johnathan Waters    Procedure(s) Performed: Procedure(s) (LRB):  Transesophageal echo (TRISH) intra-procedure log documentation (N/A)    Final Anesthesia Type: MAC      Patient location during evaluation: PACU  Patient participation: Yes- Able to Participate  Level of consciousness: awake and alert and oriented  Post-procedure vital signs: reviewed and stable  Pain management: adequate  Airway patency: patent    PONV status at discharge: No PONV  Anesthetic complications: no      Cardiovascular status: hemodynamically stable and blood pressure returned to baseline  Respiratory status: spontaneous ventilation, room air and unassisted  Hydration status: euvolemic  Follow-up not needed.          Vitals Value Taken Time   /59 08/14/23 1232   Temp  08/14/23 1315   Pulse 47 08/14/23 1233   Resp 20 08/14/23 1233   SpO2 99 % 08/14/23 1233   Vitals shown include unvalidated device data.      No case tracking events are documented in the log.      Pain/Vicky Score: Vicky Score: 10 (8/14/2023 10:43 AM)

## 2023-08-14 NOTE — TRANSFER OF CARE
Anesthesia Transfer of Care Note    Patient: Johnathan Waters    Procedure(s) Performed: Procedure(s) (LRB):  Transesophageal echo (TRISH) intra-procedure log documentation (N/A)    Patient location: Cath Lab    Anesthesia Type: MAC    Post pain: adequate analgesia    Post assessment: no apparent anesthetic complications and tolerated procedure well    Post vital signs: stable    Level of consciousness: awake and alert    Nausea/Vomiting: no nausea/vomiting    Complications: none    Transfer of care protocol was followedComments: Recovery in OR 5 by cath lab nurse      Last vitals:   Visit Vitals  BP (!) 118/57 (BP Location: Right arm, Patient Position: Lying)   Pulse 66   Temp 36.5 °C (97.7 °F) (Skin)   Resp 15   Wt 76.9 kg (169 lb 8 oz)   SpO2 97%   BMI 26.55 kg/m²

## 2023-08-14 NOTE — DISCHARGE INSTRUCTIONS
ACTIVITY LEVEL: If you have received sedation or an anesthetic, you may feel sleepy for several hours. Rest  until you are more awake. Slowly resume your normal activities. No heavy lifting, nothing more that 10-15 pounds until surgery site is healed.      NO driving, alcoholic beverages or signing legal documents for next 24 hours or while taking pain medication    DIET: You may resume your home diet. If nausea is present, increase your diet gradually with fluids and bland foods. Drink extra water for the next 48 hours.     CALL THE DOCTOR:  Fever over 101°F --any signs of infection including body aches, fever, chills, redness at surgery site   Severe pain that doesnt go away with medication.    If you feel your symptoms need immediate attention please go to the nearest Emergency Room.     Fall Prevention  Millions of people fall every year and injure themselves. You may have had anesthesia or sedation which may increase your risk of falling. You may have health issues that put you at an increased risk of falling.     Here are ways to reduce your risk of falling.    Make your home safe by keeping walkways clear of objects you may trip over.  Use non-slip pads under rugs. Do not use area rugs or small throw rugs.  Use non-slip mats in bathtubs and showers.  Install handrails and lights on staircases.  Do not walk in poorly lit areas.  Do not stand on chairs or wobbly ladders.  Use caution when reaching overhead or looking upward. This position can cause a loss of balance.  Be sure your shoes fit properly, have non-slip bottoms and are in good condition.   Wear shoes both inside and out. Avoid going barefoot or wearing slippers.  Be cautious when going up and down stairs, curbs, and when walking on uneven sidewalks.  If your balance is poor, consider using a cane or walker.  If your fall was related to alcohol use, stop or limit alcohol intake.   If your fall was related to use of sleeping medicines, talk to your  doctor about this. You may need to reduce your dosage at bedtime if you awaken during the night to go to the bathroom.    To reduce the need for nighttime bathroom trips:  Avoid drinking fluids for several hours before going to bed  Empty your bladder before going to bed  Men can keep a urinal at the bedside  Stay as active as you can. Balance, flexibility, strength, and endurance all come from exercise. They all play a role in preventing falls. Ask your healthcare provider which types of activity are right for you.  Get your vision checked on a regular basis.  If you have pets, know where they are before you stand up or walk so you don't trip over them.  Use night lights.

## 2023-08-14 NOTE — BRIEF OP NOTE
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  Normal TRISH, neg bubble study.    Findings/Key Components:   Normal biventricular size/fxn  LVEF 60%  Normal wall motion  Normal LA/RA  No LA/JOVITA thrombus  Normal valves/doppler exam (trace MR/TR/AI)  Negative saline contrast study, no evidence of PFO/ASD.    Plan:  Cont med rx  Consider outpatient EVM/ILR    Estimated Blood Loss: none         Specimens: None    Diet: cardiac    Activity: ad naima.

## 2023-08-16 ENCOUNTER — TELEPHONE (OUTPATIENT)
Dept: ADMINISTRATIVE | Facility: CLINIC | Age: 78
End: 2023-08-16
Payer: MEDICARE

## 2023-08-16 ENCOUNTER — OFFICE VISIT (OUTPATIENT)
Dept: NEUROLOGY | Facility: CLINIC | Age: 78
End: 2023-08-16
Payer: MEDICARE

## 2023-08-16 DIAGNOSIS — E78.2 MIXED HYPERLIPIDEMIA: ICD-10-CM

## 2023-08-16 DIAGNOSIS — I10 ESSENTIAL HYPERTENSION: ICD-10-CM

## 2023-08-16 DIAGNOSIS — G45.3 AMAUROSIS FUGAX OF RIGHT EYE: Primary | ICD-10-CM

## 2023-08-16 PROCEDURE — 99213 PR OFFICE/OUTPT VISIT, EST, LEVL III, 20-29 MIN: ICD-10-PCS | Mod: 95,,, | Performed by: STUDENT IN AN ORGANIZED HEALTH CARE EDUCATION/TRAINING PROGRAM

## 2023-08-16 PROCEDURE — 99213 OFFICE O/P EST LOW 20 MIN: CPT | Mod: 95,,, | Performed by: STUDENT IN AN ORGANIZED HEALTH CARE EDUCATION/TRAINING PROGRAM

## 2023-08-16 NOTE — PROGRESS NOTES
Neurology Clinic Note    The patient location is:  [place]  The chief complaint leading to consultation is:  Amaurosis fugax    Visit type: audiovisual      15 minutes of total time spent on the encounter, which includes face to face time and non-face to face time preparing to see the patient (eg, review of tests), Obtaining and/or reviewing separately obtained history, Documenting clinical information in the electronic or other health record, Independently interpreting results (not separately reported) and communicating results to the patient/family/caregiver, or Care coordination (not separately reported).     Each patient to whom he or she provides medical services by telemedicine is:  (1) informed of the relationship between the physician and patient and the respective role of any other health care provider with respect to management of the patient; and (2) notified that he or she may decline to receive medical services by telemedicine and may withdraw from such care at any time.      Date: 8/16/23  Patient Name: Johnathan Waters   MRN: [id number]   PCP: Bernard Davalos  Referring Provider: No ref. provider found    Assessment and Plan:   Johnathan Waters is a 78 y.o. male with a history of amaurosis fugax who presents for follow-up.  His vessel imaging and cardiac workup thus far has not shown any potential source of embolus.  Recommend proceeding with an ILR to monitor for paroxysmal Afib.     He was very apprehensive about the procedure and had multiple questions regarding the device, how it works and the safety.  I answered his questions but also encouraged him to raise these questions with his cardiologist.    He said he would think about it and then get in touch with his cardiologist    Continue aspirin 81 mg daily and Lipitor 40 mg daily  Target BP<140/90    Problem List Items Addressed This Visit          Neuro    Amaurosis fugax of right eye - Primary       Cardiac/Vascular    Essential  hypertension (Chronic)    Mixed hyperlipidemia         Subjective:       Interval history (8/16/2023):    MRI of the brain and MRA of the head were both normal.  He also underwent a TRISH per his cardiologist which showed no evidence of a intracardiac shunt.  No further episodes of vision loss or any other focal neurological symptoms.  Compliant with his aspirin and statin      HPI (07/06/2023):   Mr. Johnathan Waters is a 78 y.o. male with a history of HTN, HLD presenting for evaluation of amaurosis fugax.     On April 18th, 2023 he experienced a transient episode of vision loss in the right eye, involving the lower half of the visual field.  Symptoms lasted 1-2 minutes.  Denies any associated focal neurological symptoms such as slurred speech, focal weakness/numbness, double vision, vertigo.  He saw an ophthalmologist the next day and was told that his eyes are fine.  He subsequently followed up with his cardiologist and was started on aspirin 81 mg and Lipitor 40mg.  Carotid ultrasound in April showed < 50% stenosis bilaterally. He is also scheduled to undergo a TTE and 48 hour Holter monitoring.     Of note, he would an episode of palpitations several years ago for which she underwent a TTE and 48 hour Holter monitoring. Apart from mild left atrial enlargement on his TTE, his workup was unremarkable.  He was told that his symptoms were due to too much caffeine.  He has not had any similar symptoms since then.     He also mentions a history of visual auras (describes them as zigzag lines in his visual field) that occur around once every 6 months.  No associated headache or migrainous features.     No history of tobacco or illicit drug use.  He exercises regularly.    PAST MEDICAL HISTORY:  Past Medical History:   Diagnosis Date    Elevated cholesterol     Hypertension     Squamous cell carcinoma     under right eye.  Sees outside Dermatology    Trigger ring finger of right hand 1/30/2019       PAST SURGICAL  HISTORY:  Past Surgical History:   Procedure Laterality Date    COLONOSCOPY N/A 9/22/2020    Procedure: COLONOSCOPY;  Surgeon: Manuela Ward MD;  Location: [facility] ENDO;  Service: Endoscopy;  Laterality: N/A;    ECHOCARDIOGRAM,TRANSESOPHAGEAL N/A 8/14/2023    Procedure: Transesophageal echo (TRISH) intra-procedure log documentation;  Surgeon: Luis Antonio Small MD;  Location: [facility] CATH LAB;  Service: Cardiology;  Laterality: N/A;  with bubble study  RN PREOP 8/10/2023    EYE SURGERY      TRIGGER FINGER RELEASE Left 3/21/2023    Procedure: RELEASE, TRIGGER FINGER;  Surgeon: Gaby Martinez MD;  Location: [facility] OR;  Service: Orthopedics;  Laterality: Left;  RN PREOP 03/14/2023---NEED H/P    VASECTOMY         CURRENT MEDS:  Current Outpatient Medications   Medication Sig Dispense Refill    aspirin (ECOTRIN) 81 MG EC tablet Take 1 tablet (81 mg total) by mouth once daily. 90 tablet 3    atorvastatin (LIPITOR) 40 MG tablet Take 1 tablet (40 mg total) by mouth once daily. 90 tablet 3    cetirizine (ZYRTEC) 10 MG tablet       clindamycin (CLEOCIN T) 1 % external solution APPLY TOPICALLY TO FACE TWICE DAILY AS NEEDED      lisinopriL-hydrochlorothiazide (PRINZIDE,ZESTORETIC) 10-12.5 mg per tablet Take 1 tablet by mouth once daily. 90 tablet 3    mometasone 0.1% (ELOCON) 0.1 % cream Apply topically daily as needed (rash). 50 g 2    tamsulosin (FLOMAX) 0.4 mg Cap Take 1 capsule (0.4 mg total) by mouth once daily. 90 capsule 3     No current facility-administered medications for this visit.       ALLERGIES:  Review of patient's allergies indicates:   Allergen Reactions    Wasp venom Itching and Swelling    Sulfa (sulfonamide antibiotics) Other (See Comments)     Patient unsure, toddler       FAMILY HISTORY:  Family History   Problem Relation Age of Onset    Hypertension Mother     Cancer Father         lung    Diabetes Sister     Asthma Daughter     No Known Problems Daughter        SOCIAL HISTORY:  Social History      Tobacco Use    Smoking status: Never    Smokeless tobacco: Never   Substance Use Topics    Alcohol use: Yes     Comment: Rarely    Drug use: No       Review of Systems:  12 system review of systems is negative except for the symptoms mentioned in HPI.      Objective:   There were no vitals filed for this visit.      Exam limited -  televideo visit    General: Well-developed, well-groomed. No apparent distress    Neurologic Exam  The patient is awake, alert and oriented. Language is fluent.  Fund of knowledge and attention are appropriate, able to provide detailed medical history.    Cranial nerves:   Ocular motility is full in all cardinal positions of gaze.   Facial activation is symmetric.   Shoulder elevation is symmetric.  Tongue protrudes midline.  Exam limited 2/2 televideo visit.    Motor examination   Normal bulk in BUE  No pronator drift.  Exam limited 2/2 televideo visit.    Sensory examination   Deferred - televideo visit    Deep tendon reflexes  Deferred - televideo visit    Gait:   Deferred - televideo visit    Coordination: Finger to nose is normal bilaterally.  Rapid finger movements intact b/l.               Images:    Other Studies:          Lalito Acuna MD  Department of Neurology  [facility]

## 2023-08-17 ENCOUNTER — PATIENT MESSAGE (OUTPATIENT)
Dept: NEUROLOGY | Facility: CLINIC | Age: 78
End: 2023-08-17
Payer: MEDICARE

## 2023-08-17 ENCOUNTER — OFFICE VISIT (OUTPATIENT)
Dept: FAMILY MEDICINE | Facility: CLINIC | Age: 78
End: 2023-08-17
Payer: MEDICARE

## 2023-08-17 VITALS
DIASTOLIC BLOOD PRESSURE: 60 MMHG | TEMPERATURE: 98 F | SYSTOLIC BLOOD PRESSURE: 130 MMHG | BODY MASS INDEX: 26.99 KG/M2 | OXYGEN SATURATION: 97 % | HEART RATE: 60 BPM | WEIGHT: 171.94 LBS | HEIGHT: 67 IN

## 2023-08-17 DIAGNOSIS — E78.2 MIXED HYPERLIPIDEMIA: ICD-10-CM

## 2023-08-17 DIAGNOSIS — I10 ESSENTIAL HYPERTENSION: Chronic | ICD-10-CM

## 2023-08-17 DIAGNOSIS — I70.0 AORTIC ATHEROSCLEROSIS: ICD-10-CM

## 2023-08-17 DIAGNOSIS — N40.1 BENIGN NON-NODULAR PROSTATIC HYPERPLASIA WITH LOWER URINARY TRACT SYMPTOMS: Chronic | ICD-10-CM

## 2023-08-17 DIAGNOSIS — Z00.00 ENCOUNTER FOR PREVENTIVE HEALTH EXAMINATION: Primary | ICD-10-CM

## 2023-08-17 PROCEDURE — G0439 PPPS, SUBSEQ VISIT: HCPCS | Mod: ,,, | Performed by: NURSE PRACTITIONER

## 2023-08-17 PROCEDURE — G0439 PR MEDICARE ANNUAL WELLNESS SUBSEQUENT VISIT: ICD-10-PCS | Mod: ,,, | Performed by: NURSE PRACTITIONER

## 2023-08-17 PROCEDURE — 99214 OFFICE O/P EST MOD 30 MIN: CPT | Mod: PBBFAC,PO | Performed by: NURSE PRACTITIONER

## 2023-08-17 PROCEDURE — 99999 PR PBB SHADOW E&M-EST. PATIENT-LVL IV: CPT | Mod: PBBFAC,,, | Performed by: NURSE PRACTITIONER

## 2023-08-17 PROCEDURE — 99999 PR PBB SHADOW E&M-EST. PATIENT-LVL IV: ICD-10-PCS | Mod: PBBFAC,,, | Performed by: NURSE PRACTITIONER

## 2023-08-17 NOTE — PROGRESS NOTES
"  Johnathan Waters presented for a  Medicare AWV and comprehensive Health Risk Assessment today. The following components were reviewed and updated:    Medical history  Family History  Social history  Allergies and Current Medications  Health Risk Assessment  Health Maintenance  Care Team         ** See Completed Assessments for Annual Wellness Visit within the encounter summary.**         The following assessments were completed:  Living Situation  CAGE  Depression Screening  Timed Get Up and Go  Whisper Test  Cognitive Function Screening  Nutrition Screening  ADL Screening  PAQ Screening        Vitals:    08/17/23 0955 08/17/23 1004   BP:  130/60   Pulse:  60   Temp:  97.9 °F (36.6 °C)   SpO2:  97%   Weight: 78 kg (171 lb 15.3 oz)    Height: 5' 7" (1.702 m)      Body mass index is 26.93 kg/m².  Physical Exam  Vitals reviewed.   Constitutional:       Appearance: Normal appearance.   Pulmonary:      Effort: Pulmonary effort is normal.   Skin:     General: Skin is warm and dry.   Neurological:      General: No focal deficit present.      Mental Status: He is alert and oriented to person, place, and time.   Psychiatric:         Mood and Affect: Mood normal.         Behavior: Behavior normal.                 Diagnoses and health risks identified today and associated recommendations/orders:    1. Encounter for preventive health examination  Patient was seen today for AWV.  Healthcare maintenance and screening recommendations were discussed and updated as indicated.  Return in one year for AWV.    2. Aortic atherosclerosis  Stable. Statin therapy.    3. Essential hypertension  The current medical regimen is effective;  continue present plan and medications.      4. Mixed hyperlipidemia  The current medical regimen is effective;  continue present plan and medications.      5. Benign non-nodular prostatic hyperplasia with lower urinary tract symptoms  The current medical regimen is effective;  continue present plan and " medications.        Provided Johnathan Manning with a 5-10 year written screening schedule and personal prevention plan. Recommendations were developed using the USPSTF age appropriate recommendations. Education, counseling, and referrals were provided as needed. After Visit Summary printed and given to patient which includes a list of additional screenings\tests needed.    No follow-ups on file.    Angle Brannon NP  I offered to discuss advanced care planning, including how to pick a person who would make decisions for you if you were unable to make them for yourself, called a health care power of , and what kind of decisions you might make such as use of life sustaining treatments such as ventilators and tube feeding when faced with a life limiting illness recorded on a living will that they will need to know. (How you want to be cared for as you near the end of your natural life)     X  Patient has advanced directives on file, which we reviewed, and they do not wish to make changes.

## 2023-08-17 NOTE — PATIENT INSTRUCTIONS
Counseling and Referral of Other Preventative  (Italic type indicates deductible and co-insurance are waived)    Patient Name: Johnathan Waters  Today's Date: 8/17/2023    Health Maintenance       Date Due Completion Date    COVID-19 Vaccine (5 - Moderna risk series) 12/23/2022 10/28/2022    Influenza Vaccine (1) 09/01/2023 10/13/2022    Aspirin/Antiplatelet Therapy 08/14/2024 8/14/2023    TETANUS VACCINE 12/26/2024 12/26/2014    Lipid Panel 07/26/2028 7/26/2023    Colonoscopy 09/22/2030 9/22/2020    Override on 6/1/2017: Declined (will proceed with stool cards instead)    Override on 3/3/2006: Done (normal.  In [facility] documents)        No orders of the defined types were placed in this encounter.      The following information is provided to all patients.  This information is to help you find resources for any of the problems found today that may be affecting your health:                Living healthy guide: www.[facility].[place].[facility]      Understanding Diabetes: www.diabetes.org      Eating healthy: www.cdc.gov/healthyweight      [facility] home safety checklist: www.cdc.gov/steadi/patient.html      Agency on Aging: www.goea.[place].gov      Alcoholics anonymous (AA): www.aa.org      Physical Activity: www.rachel.nih.gov/uw0xbbb      Tobacco use: www.quitwithusla.org

## 2023-09-06 ENCOUNTER — OFFICE VISIT (OUTPATIENT)
Dept: CARDIOLOGY | Facility: CLINIC | Age: 78
End: 2023-09-06
Payer: MEDICARE

## 2023-09-06 VITALS
BODY MASS INDEX: 26.92 KG/M2 | SYSTOLIC BLOOD PRESSURE: 124 MMHG | OXYGEN SATURATION: 97 % | WEIGHT: 171.5 LBS | DIASTOLIC BLOOD PRESSURE: 58 MMHG | HEIGHT: 67 IN | HEART RATE: 62 BPM | RESPIRATION RATE: 18 BRPM

## 2023-09-06 DIAGNOSIS — I70.0 AORTIC ATHEROSCLEROSIS: ICD-10-CM

## 2023-09-06 DIAGNOSIS — E78.2 MIXED HYPERLIPIDEMIA: ICD-10-CM

## 2023-09-06 DIAGNOSIS — I10 ESSENTIAL HYPERTENSION: ICD-10-CM

## 2023-09-06 DIAGNOSIS — G45.3 AMAUROSIS FUGAX OF RIGHT EYE: Primary | ICD-10-CM

## 2023-09-06 DIAGNOSIS — I65.22 CAROTID ATHEROSCLEROSIS, LEFT: ICD-10-CM

## 2023-09-06 PROCEDURE — 99214 PR OFFICE/OUTPT VISIT, EST, LEVL IV, 30-39 MIN: ICD-10-PCS | Mod: S$PBB,,, | Performed by: INTERNAL MEDICINE

## 2023-09-06 PROCEDURE — 99214 OFFICE O/P EST MOD 30 MIN: CPT | Mod: S$PBB,,, | Performed by: INTERNAL MEDICINE

## 2023-09-06 PROCEDURE — 99999 PR PBB SHADOW E&M-EST. PATIENT-LVL IV: CPT | Mod: PBBFAC,,, | Performed by: INTERNAL MEDICINE

## 2023-09-06 PROCEDURE — 99999 PR PBB SHADOW E&M-EST. PATIENT-LVL IV: ICD-10-PCS | Mod: PBBFAC,,, | Performed by: INTERNAL MEDICINE

## 2023-09-06 PROCEDURE — 99214 OFFICE O/P EST MOD 30 MIN: CPT | Mod: PBBFAC,PO | Performed by: INTERNAL MEDICINE

## 2023-09-06 NOTE — PROGRESS NOTES
CARDIOVASCULAR CONSULTATION    REASON FOR CONSULT:   Johnathan Waters is a 78 y.o. male who presents for f/u ?R eye amaurosis, testing.    PCP: Susannah  Neuro: Kalpana  HISTORY OF PRESENT ILLNESS:   The patient returns for follow up, accompanied by his wife.  He denies any recurrent amaurosis nor any symptoms of TIA or stroke.  He otherwise denies angina, dyspnea, palpitations, or syncope.  There has been no PND, orthopnea, melena, hematuria, or claudication symptoms.      I reviewed the results of his TRISH which was negative for intracardiac shunting.  I have again recommended we move forward with implantable loop recorder.  We also discussed possible event monitor.  He is now agreeable to ILR placement and I will refer him to Dr. Bronson for this.    CARDIOVASCULAR HISTORY:   ?L car atherosclerosis vs tortuous vessel (CUS 5/2023)    PAST MEDICAL HISTORY:     Past Medical History:   Diagnosis Date    Elevated cholesterol     Hypertension     Squamous cell carcinoma     under right eye.  Sees outside Dermatology    Trigger ring finger of right hand 1/30/2019       PAST SURGICAL HISTORY:     Past Surgical History:   Procedure Laterality Date    COLONOSCOPY N/A 9/22/2020    Procedure: COLONOSCOPY;  Surgeon: Manuela Ward MD;  Location: [facility] ENDO;  Service: Endoscopy;  Laterality: N/A;    ECHOCARDIOGRAM,TRANSESOPHAGEAL N/A 8/14/2023    Procedure: Transesophageal echo (TRISH) intra-procedure log documentation;  Surgeon: Luis Antonio Small MD;  Location: [facility] CATH LAB;  Service: Cardiology;  Laterality: N/A;  with bubble study  RN PREOP 8/10/2023    EYE SURGERY      TRIGGER FINGER RELEASE Left 3/21/2023    Procedure: RELEASE, TRIGGER FINGER;  Surgeon: Gaby Martinez MD;  Location: [facility] OR;  Service: Orthopedics;  Laterality: Left;  RN PREOP 03/14/2023---NEED H/P    VASECTOMY         ALLERGIES AND MEDICATION:     Review of patient's allergies indicates:   Allergen Reactions    Wasp venom Itching and Swelling     Sulfa (sulfonamide antibiotics) Other (See Comments)     Patient unsure, toddler        Medication List            Accurate as of September 6, 2023  2:21 PM. If you have any questions, ask your nurse or doctor.                CONTINUE taking these medications      aspirin 81 MG EC tablet  Commonly known as: ECOTRIN  Take 1 tablet (81 mg total) by mouth once daily.     atorvastatin 40 MG tablet  Commonly known as: LIPITOR  Take 1 tablet (40 mg total) by mouth once daily.     cetirizine 10 MG tablet  Commonly known as: ZYRTEC     clindamycin 1 % external solution  Commonly known as: CLEOCIN T     lisinopriL-hydrochlorothiazide 10-12.5 mg per tablet  Commonly known as: PRINZIDE,ZESTORETIC  Take 1 tablet by mouth once daily.     mometasone 0.1% 0.1 % cream  Commonly known as: ELOCON  Apply topically daily as needed (rash).     tamsulosin 0.4 mg Cap  Commonly known as: FLOMAX  Take 1 capsule (0.4 mg total) by mouth once daily.              SOCIAL HISTORY:     Social History     Socioeconomic History    Marital status:    Occupational History    Occupation: Retired   Tobacco Use    Smoking status: Never    Smokeless tobacco: Never   Substance and Sexual Activity    Alcohol use: Yes     Comment: Rarely    Drug use: No    Sexual activity: Yes     Partners: Female     Comment:      Social Determinants of Health     Financial Resource Strain: Low Risk  (9/4/2023)    Overall Financial Resource Strain (CARDIA)     Difficulty of Paying Living Expenses: Not hard at all   Food Insecurity: No Food Insecurity (9/4/2023)    Hunger Vital Sign     Worried About Running Out of Food in the Last Year: Never true     Ran Out of Food in the Last Year: Never true   Transportation Needs: No Transportation Needs (9/4/2023)    PRAPARE - Transportation     Lack of Transportation (Medical): No     Lack of Transportation (Non-Medical): No   Physical Activity: Sufficiently Active (9/4/2023)    Exercise Vital Sign     Days of Exercise  per Week: 5 days     Minutes of Exercise per Session: 60 min   Stress: No Stress Concern Present (9/4/2023)    [place] [employer] - Occupational Stress Questionnaire     Feeling of Stress : Not at all   Social Connections: Moderately Integrated (9/4/2023)    Social Connection and Isolation Panel [NHANES]     Frequency of Communication with Friends and Family: Twice a week     Frequency of Social Gatherings with Friends and Family: Once a week     Attends [employer] Services: More than 4 times per year     Active Member of Clubs or Organizations: No     Attends Club or Organization Meetings: Patient refused     Marital Status:    Housing Stability: Low Risk  (9/4/2023)    Housing Stability Vital Sign     Unable to Pay for Housing in the Last Year: No     Number of Places Lived in the Last Year: 1     Unstable Housing in the Last Year: No       FAMILY HISTORY:     Family History   Problem Relation Age of Onset    Hypertension Mother     Cancer Father         lung    Diabetes Sister     Asthma Daughter     No Known Problems Daughter        REVIEW OF SYSTEMS:   Review of Systems   Constitutional:  Negative for chills, diaphoresis and fever.   HENT:  Negative for nosebleeds.    Eyes:  Negative for blurred vision, double vision and photophobia.        Brief R eye lower field visual loss   Respiratory:  Negative for hemoptysis, shortness of breath and wheezing.    Cardiovascular:  Negative for chest pain, palpitations, orthopnea, claudication, leg swelling and PND.   Gastrointestinal:  Negative for abdominal pain, blood in stool, heartburn, melena, nausea and vomiting.   Genitourinary:  Negative for flank pain and hematuria.   Musculoskeletal:  Negative for falls, myalgias and neck pain.   Skin:  Negative for rash.   Neurological:  Negative for dizziness, seizures, loss of consciousness, weakness and headaches.   Endo/Heme/Allergies:  Negative for polydipsia. Does not bruise/bleed easily.  "  Psychiatric/Behavioral:  Negative for depression and memory loss. The patient is not nervous/anxious.        PHYSICAL EXAM:     Vitals:    09/06/23 1405   BP: (!) 124/58   Pulse: 62   Resp: 18    Body mass index is 26.86 kg/m².  Weight: 77.8 kg (171 lb 8.3 oz)   Height: 5' 7" (170.2 cm)     Physical Exam  Vitals reviewed.   Constitutional:       General: He is not in acute distress.     Appearance: Normal appearance. He is well-developed and normal weight. He is not ill-appearing, toxic-appearing or diaphoretic.   HENT:      Head: Normocephalic and atraumatic.   Eyes:      General: No scleral icterus.     Extraocular Movements: Extraocular movements intact.      Conjunctiva/sclera: Conjunctivae normal.      Pupils: Pupils are equal, round, and reactive to light.   Neck:      Thyroid: No thyromegaly.      Vascular: Normal carotid pulses. Carotid bruit present. No JVD.      Trachea: Trachea normal.   Cardiovascular:      Rate and Rhythm: Normal rate and regular rhythm.      Pulses:           Carotid pulses are 2+ on the right side and 2+ on the left side with bruit.     Heart sounds: S1 normal and S2 normal. Murmur heard.      Systolic murmur is present with a grade of 1/6.      No friction rub. No gallop.   Pulmonary:      Effort: Pulmonary effort is normal. No respiratory distress.      Breath sounds: Normal breath sounds. No stridor. No wheezing, rhonchi or rales.   Chest:      Chest wall: No tenderness.   Abdominal:      General: There is no distension.      Palpations: Abdomen is soft.   Musculoskeletal:         General: No swelling or tenderness. Normal range of motion.      Cervical back: Normal range of motion and neck supple. No edema or rigidity.      Right lower leg: No edema.      Left lower leg: No edema.   Feet:      Right foot:      Skin integrity: No ulcer.      Left foot:      Skin integrity: No ulcer.   Skin:     General: Skin is warm and dry.      Coloration: Skin is not jaundiced.   Neurological: "      General: No focal deficit present.      Mental Status: He is alert and oriented to person, place, and time.      Cranial Nerves: No cranial nerve deficit.   Psychiatric:         Mood and Affect: Mood normal.         Speech: Speech normal.         Behavior: Behavior normal. Behavior is cooperative.       DATA:   EKG: (personally reviewed tracing(s))  6/14/23 SR 63, PAC, PRWP, similar to 3/14/23    Laboratory:  CBC:  Recent Labs   Lab 03/14/23  0910 04/25/23  1518 07/26/23  0809   WBC 7.18 7.51 9.18   Hemoglobin 13.3 L 13.5 L 13.3 L   Hematocrit 39.2 L 42.9 41.9   Platelets 267 305 311         CHEMISTRIES:  Recent Labs   Lab 01/04/21  1648 07/24/21  0826 07/29/22  0821 03/14/23  0910 07/26/23  0809   Glucose 131 H 92 92 96 88   Sodium 141 140 140 141 141   Potassium 3.6 4.0 4.1 3.4 L 4.2   BUN 16 16 16 20 21   Creatinine 1.0 1.1 1.0 1.1 1.1   eGFR if non African American >60.0 >60.0 >60.0  --   --    eGFR  --   --   --  >60 >60.0   Calcium 9.2 9.7 9.1 8.8 9.1         CARDIAC BIOMARKERS:        COAGS:  Recent Labs   Lab 04/25/23  1518 08/10/23  1550   INR 1.0 1.0         LIPIDS/LFTS:  Recent Labs   Lab 07/24/21  0826 07/29/22  0821 03/14/23  0910 07/26/23  0809   Cholesterol 172 171  --  109 L   Triglycerides 68 97  --  52   HDL 59 54  --  50   LDL Cholesterol 99.4 97.6  --  48.6 L   Non-HDL Cholesterol 113 117  --  59   AST 17 17 14 18   ALT 25 25 17 29         Cardiovascular Testing:  TRISH 8/14/23  Normal biventricular size/fxn  LVEF 60%  Normal wall motion  Normal LA/RA  No LA/JOVITA thrombus  Normal valves/doppler exam (trace MR/TR/AI)  Negative saline contrast study, no evidence of PFO/ASD.  Plan:  Cont med rx  Consider outpatient EVM/ILR    Echo 7/20/23  The left ventricle is normal in size with normal systolic function.  The estimated ejection fraction is 55%.  Normal right ventricular size with normal right ventricular systolic function.  Mild mitral regurgitation.  The estimated PA systolic pressure is 34  mmHg.  Saline contrast study notable for bubbles in left heart chambers after 10 cardiac cycles suggesting transpulmonary shunt.  No intracardiac source of embolus noted on this exam. If high clinical suspicion, consider TRISH +/- bubble study.    Aortic US 7/20/23  Suprarenal aortic ectasia without evidence of AAA, maximum diameter 2.7 cm.    Aortic atherosclerosis.    Holter 7/20/23  NSR. Heart rates varied between 39 and 92 BPM with an average of 55 BPM.  There were rare PVCs totalling 404 and averaging 8.42 per hour. There were 2 couplets  There were very frequent PACs totalling 6207 and averaging 129.31 per hour    Carotid US 5/18/23  There is 0-19% right Internal Carotid Stenosis.  There is 40-49% left Internal Carotid Stenosis. Vessel tortuosity in area of increased velocity (may artifactually suggest stenosis).    Ex stress echo 8/20/18  The patient exercised for 10.5 minutes, corresponding to a functional capacity of 12 estimated METS, achieving a peak heart rate of 142 bpm, which is 97% of the age predicted maximum heart rate.   There were no significant electrocardiographic changes throughout the protocol suggesting ischemia.   EKG Conclusions:   1. The EKG portion of this study is negative for ischemia at a high workload, and peak heart rate of 142 bpm (97% of predicted).   2. Blood pressure response to exercise was normal (Presenting BP: 139/65 Peak BP: 187/82).   3. No significant arrhythmias were present.   4. There were no symptoms of chest discomfort or significant dyspnea throughout the protocol.   Post Exercise Imaging:   Immediate post exercise images demonstrate left ventricular function augmenting.   No exercise induced wall motion abnormalities were identified.   CONCLUSIONS     1 - Normal left ventricular systolic function (EF 55-60%).     2 - Concentric remodeling.     3 - Mild left atrial enlargement.   No evidence of stress induced myocardial ischemia.       ASSESSMENT:   # ?amaurosis R eye,  seems atypical.  Neg optho exam.  Sxs abated.  TRISH 8/2023 neg.  # HTN, controlled  # HLP on atorva 40mg  # ?L carotid atherosclerosis (CUS 5/2023 with ?tortuosity), L carotid bruit  # aortic atherosclerosis (aortic US 7/20/23)    PLAN:   Cont med rx  Cont ASA 81mg qd  Refer to Dr. rBonson for ILR placement.  RTC 3 months with surveillance carotid US (Dec 2023)      Luis Antonio Small MD, FACC

## 2023-09-13 ENCOUNTER — OFFICE VISIT (OUTPATIENT)
Dept: CARDIOLOGY | Facility: CLINIC | Age: 78
End: 2023-09-13
Payer: MEDICARE

## 2023-09-13 VITALS
WEIGHT: 170.06 LBS | HEART RATE: 71 BPM | RESPIRATION RATE: 15 BRPM | BODY MASS INDEX: 26.69 KG/M2 | HEIGHT: 67 IN | OXYGEN SATURATION: 96 % | SYSTOLIC BLOOD PRESSURE: 100 MMHG | DIASTOLIC BLOOD PRESSURE: 56 MMHG

## 2023-09-13 DIAGNOSIS — E78.2 MIXED HYPERLIPIDEMIA: ICD-10-CM

## 2023-09-13 DIAGNOSIS — G45.9 TIA (TRANSIENT ISCHEMIC ATTACK): ICD-10-CM

## 2023-09-13 DIAGNOSIS — I70.0 AORTIC ATHEROSCLEROSIS: ICD-10-CM

## 2023-09-13 DIAGNOSIS — I10 ESSENTIAL HYPERTENSION: Primary | ICD-10-CM

## 2023-09-13 DIAGNOSIS — G45.3 AMAUROSIS FUGAX OF RIGHT EYE: ICD-10-CM

## 2023-09-13 PROCEDURE — 99999 PR PBB SHADOW E&M-EST. PATIENT-LVL IV: ICD-10-PCS | Mod: PBBFAC,,, | Performed by: INTERNAL MEDICINE

## 2023-09-13 PROCEDURE — 99215 PR OFFICE/OUTPT VISIT, EST, LEVL V, 40-54 MIN: ICD-10-PCS | Mod: S$PBB,,, | Performed by: INTERNAL MEDICINE

## 2023-09-13 PROCEDURE — 93005 ELECTROCARDIOGRAM TRACING: CPT | Mod: PBBFAC,PO | Performed by: INTERNAL MEDICINE

## 2023-09-13 PROCEDURE — 93010 ELECTROCARDIOGRAM REPORT: CPT | Mod: S$PBB,,, | Performed by: INTERNAL MEDICINE

## 2023-09-13 PROCEDURE — 99214 OFFICE O/P EST MOD 30 MIN: CPT | Mod: PBBFAC,PO | Performed by: INTERNAL MEDICINE

## 2023-09-13 PROCEDURE — 93010 EKG 12-LEAD: ICD-10-PCS | Mod: S$PBB,,, | Performed by: INTERNAL MEDICINE

## 2023-09-13 PROCEDURE — 99999 PR PBB SHADOW E&M-EST. PATIENT-LVL IV: CPT | Mod: PBBFAC,,, | Performed by: INTERNAL MEDICINE

## 2023-09-13 PROCEDURE — 99215 OFFICE O/P EST HI 40 MIN: CPT | Mod: S$PBB,,, | Performed by: INTERNAL MEDICINE

## 2023-09-13 RX ORDER — SODIUM CHLORIDE 9 MG/ML
INJECTION, SOLUTION INTRAVENOUS CONTINUOUS
Status: CANCELLED | OUTPATIENT
Start: 2023-09-13

## 2023-09-13 RX ORDER — DIPHENHYDRAMINE HCL 25 MG
50 CAPSULE ORAL ONCE
Status: CANCELLED | OUTPATIENT
Start: 2023-09-13 | End: 2023-09-13

## 2023-09-13 RX ORDER — SODIUM CHLORIDE 0.9 % (FLUSH) 0.9 %
10 SYRINGE (ML) INJECTION
Status: DISCONTINUED | OUTPATIENT
Start: 2023-09-13 | End: 2023-09-29 | Stop reason: CLARIF

## 2023-09-13 NOTE — PROGRESS NOTES
CARDIOVASCULAR CONSULTATION    REASON FOR CONSULT:   Johnathan Waters is a 78 y.o. male who presents for f/u ?R eye amaurosis, testing.    PCP: Susannah  Neuro: Nadimpally  HISTORY OF PRESENT ILLNESS:   The patient returns for follow up, accompanied by his wife.  He denies any recurrent amaurosis nor any symptoms of TIA or stroke.  He otherwise denies angina, dyspnea, palpitations, or syncope.  There has been no PND, orthopnea, melena, hematuria, or claudication symptoms.      I reviewed the results of his TRISH which was negative for intracardiac shunting.  I have again recommended we move forward with implantable loop recorder.  We also discussed possible event monitor.  He is now agreeable to ILR placement and I will refer him to Dr. Bronson for this.    Notes from September 23:  Patient here for follow-up.  No further cardiac symptoms.  Here to discuss loop recorder and has been referred by Dr. Small for implantable loop recorder insertion.    CARDIOVASCULAR HISTORY:   ?L car atherosclerosis vs tortuous vessel (CUS 5/2023)    PAST MEDICAL HISTORY:     Past Medical History:   Diagnosis Date    Elevated cholesterol     Hypertension     Squamous cell carcinoma     under right eye.  Sees outside Dermatology    Trigger ring finger of right hand 1/30/2019       PAST SURGICAL HISTORY:     Past Surgical History:   Procedure Laterality Date    COLONOSCOPY N/A 9/22/2020    Procedure: COLONOSCOPY;  Surgeon: Manuela Ward MD;  Location: [facility] ENDO;  Service: Endoscopy;  Laterality: N/A;    ECHOCARDIOGRAM,TRANSESOPHAGEAL N/A 8/14/2023    Procedure: Transesophageal echo (TRISH) intra-procedure log documentation;  Surgeon: Luis Antonio Small MD;  Location: [facility] CATH LAB;  Service: Cardiology;  Laterality: N/A;  with bubble study  RN PREOP 8/10/2023    EYE SURGERY      TRIGGER FINGER RELEASE Left 3/21/2023    Procedure: RELEASE, TRIGGER FINGER;  Surgeon: Gaby Martinez MD;  Location: [facility] OR;  Service: Orthopedics;   Laterality: Left;  RN PREOP 03/14/2023---NEED H/P    VASECTOMY         ALLERGIES AND MEDICATION:     Review of patient's allergies indicates:   Allergen Reactions    Wasp venom Itching and Swelling    Sulfa (sulfonamide antibiotics) Other (See Comments)     Patient unsure, toddler        Medication List            Accurate as of September 13, 2023  2:59 PM. If you have any questions, ask your nurse or doctor.                CONTINUE taking these medications      aspirin 81 MG EC tablet  Commonly known as: ECOTRIN  Take 1 tablet (81 mg total) by mouth once daily.     atorvastatin 40 MG tablet  Commonly known as: LIPITOR  Take 1 tablet (40 mg total) by mouth once daily.     cetirizine 10 MG tablet  Commonly known as: ZYRTEC     clindamycin 1 % external solution  Commonly known as: CLEOCIN T     lisinopriL-hydrochlorothiazide 10-12.5 mg per tablet  Commonly known as: PRINZIDE,ZESTORETIC  Take 1 tablet by mouth once daily.     mometasone 0.1% 0.1 % cream  Commonly known as: ELOCON  Apply topically daily as needed (rash).     tamsulosin 0.4 mg Cap  Commonly known as: FLOMAX  Take 1 capsule (0.4 mg total) by mouth once daily.              SOCIAL HISTORY:     Social History     Socioeconomic History    Marital status:    Occupational History    Occupation: Retired   Tobacco Use    Smoking status: Never    Smokeless tobacco: Never   Substance and Sexual Activity    Alcohol use: Yes     Comment: Rarely    Drug use: No    Sexual activity: Yes     Partners: Female     Comment:      Social Determinants of Health     Financial Resource Strain: Low Risk  (9/12/2023)    Overall Financial Resource Strain (CARDIA)     Difficulty of Paying Living Expenses: Not hard at all   Food Insecurity: No Food Insecurity (9/12/2023)    Hunger Vital Sign     Worried About Running Out of Food in the Last Year: Never true     Ran Out of Food in the Last Year: Never true   Transportation Needs: No Transportation Needs (9/12/2023)     PRAPARE - Transportation     Lack of Transportation (Medical): No     Lack of Transportation (Non-Medical): No   Physical Activity: Sufficiently Active (9/12/2023)    Exercise Vital Sign     Days of Exercise per Week: 5 days     Minutes of Exercise per Session: 60 min   Stress: No Stress Concern Present (9/12/2023)    [employer] [place] [employer] - Occupational Stress Questionnaire     Feeling of Stress : Not at all   Social Connections: Moderately Integrated (9/12/2023)    Social Connection and Isolation Panel [NHANES]     Frequency of Communication with Friends and Family: Twice a week     Frequency of Social Gatherings with Friends and Family: Once a week     Attends Muslim Services: More than 4 times per year     Active Member of Clubs or Organizations: No     Attends Club or Organization Meetings: Patient refused     Marital Status:    Housing Stability: Low Risk  (9/12/2023)    Housing Stability Vital Sign     Unable to Pay for Housing in the Last Year: No     Number of Places Lived in the Last Year: 1     Unstable Housing in the Last Year: No       FAMILY HISTORY:     Family History   Problem Relation Age of Onset    Hypertension Mother     Cancer Father         lung    Diabetes Sister     Asthma Daughter     No Known Problems Daughter        REVIEW OF SYSTEMS:   Review of Systems   Constitutional:  Negative for chills, diaphoresis and fever.   HENT:  Negative for nosebleeds.    Eyes:  Negative for blurred vision, double vision and photophobia.        Brief R eye lower field visual loss   Respiratory:  Negative for hemoptysis, shortness of breath and wheezing.    Cardiovascular:  Negative for chest pain, palpitations, orthopnea, claudication, leg swelling and PND.   Gastrointestinal:  Negative for abdominal pain, blood in stool, heartburn, melena, nausea and vomiting.   Genitourinary:  Negative for flank pain and hematuria.   Musculoskeletal:  Negative for falls, myalgias and neck pain.  "  Skin:  Negative for rash.   Neurological:  Negative for dizziness, seizures, loss of consciousness, weakness and headaches.   Endo/Heme/Allergies:  Negative for polydipsia. Does not bruise/bleed easily.   Psychiatric/Behavioral:  Negative for depression and memory loss. The patient is not nervous/anxious.        PHYSICAL EXAM:     Vitals:    09/13/23 1438   BP: (!) 100/56   Pulse: 71   Resp: 15    Body mass index is 26.64 kg/m².  Weight: 77.2 kg (170 lb 1.4 oz)   Height: 5' 7" (170.2 cm)     Physical Exam  Vitals reviewed.   Constitutional:       General: He is not in acute distress.     Appearance: Normal appearance. He is well-developed and normal weight. He is not ill-appearing, toxic-appearing or diaphoretic.   HENT:      Head: Normocephalic and atraumatic.   Eyes:      General: No scleral icterus.     Extraocular Movements: Extraocular movements intact.      Conjunctiva/sclera: Conjunctivae normal.      Pupils: Pupils are equal, round, and reactive to light.   Neck:      Thyroid: No thyromegaly.      Vascular: Normal carotid pulses. Carotid bruit present. No JVD.      Trachea: Trachea normal.   Cardiovascular:      Rate and Rhythm: Normal rate and regular rhythm.      Pulses:           Carotid pulses are 2+ on the right side and 2+ on the left side with bruit.     Heart sounds: S1 normal and S2 normal. Murmur heard.      Systolic murmur is present with a grade of 1/6.      No friction rub. No gallop.   Pulmonary:      Effort: Pulmonary effort is normal. No respiratory distress.      Breath sounds: Normal breath sounds. No stridor. No wheezing, rhonchi or rales.   Chest:      Chest wall: No tenderness.   Abdominal:      General: There is no distension.      Palpations: Abdomen is soft.   Musculoskeletal:         General: No swelling or tenderness. Normal range of motion.      Cervical back: Normal range of motion and neck supple. No edema or rigidity.      Right lower leg: No edema.      Left lower leg: No " edema.   Feet:      Right foot:      Skin integrity: No ulcer.      Left foot:      Skin integrity: No ulcer.   Skin:     General: Skin is warm and dry.      Coloration: Skin is not jaundiced.   Neurological:      General: No focal deficit present.      Mental Status: He is alert and oriented to person, place, and time.      Cranial Nerves: No cranial nerve deficit.   Psychiatric:         Mood and Affect: Mood normal.         Speech: Speech normal.         Behavior: Behavior normal. Behavior is cooperative.       DATA:   EKG: (personally reviewed tracing(s))  6/14/23 SR 63, PAC, PRWP, similar to 3/14/23    Laboratory:  CBC:  Recent Labs   Lab 03/14/23  0910 04/25/23  1518 07/26/23  0809   WBC 7.18 7.51 9.18   Hemoglobin 13.3 L 13.5 L 13.3 L   Hematocrit 39.2 L 42.9 41.9   Platelets 267 305 311         CHEMISTRIES:  Recent Labs   Lab 01/04/21  1648 07/24/21  0826 07/29/22  0821 03/14/23  0910 07/26/23  0809   Glucose 131 H 92 92 96 88   Sodium 141 140 140 141 141   Potassium 3.6 4.0 4.1 3.4 L 4.2   BUN 16 16 16 20 21   Creatinine 1.0 1.1 1.0 1.1 1.1   eGFR if non African American >60.0 >60.0 >60.0  --   --    eGFR  --   --   --  >60 >60.0   Calcium 9.2 9.7 9.1 8.8 9.1         CARDIAC BIOMARKERS:        COAGS:  Recent Labs   Lab 04/25/23  1518 08/10/23  1550   INR 1.0 1.0         LIPIDS/LFTS:  Recent Labs   Lab 07/24/21  0826 07/29/22  0821 03/14/23  0910 07/26/23  0809   Cholesterol 172 171  --  109 L   Triglycerides 68 97  --  52   HDL 59 54  --  50   LDL Cholesterol 99.4 97.6  --  48.6 L   Non-HDL Cholesterol 113 117  --  59   AST 17 17 14 18   ALT 25 25 17 29         Cardiovascular Testing:  TRSIH 8/14/23  Normal biventricular size/fxn  LVEF 60%  Normal wall motion  Normal LA/RA  No LA/JOVITA thrombus  Normal valves/doppler exam (trace MR/TR/AI)  Negative saline contrast study, no evidence of PFO/ASD.  Plan:  Cont med rx  Consider outpatient EVM/ILR    Echo 7/20/23  The left ventricle is normal in size with normal  systolic function.  The estimated ejection fraction is 55%.  Normal right ventricular size with normal right ventricular systolic function.  Mild mitral regurgitation.  The estimated PA systolic pressure is 34 mmHg.  Saline contrast study notable for bubbles in left heart chambers after 10 cardiac cycles suggesting transpulmonary shunt.  No intracardiac source of embolus noted on this exam. If high clinical suspicion, consider TRISH +/- bubble study.    Aortic US 7/20/23  Suprarenal aortic ectasia without evidence of AAA, maximum diameter 2.7 cm.    Aortic atherosclerosis.    Holter 7/20/23  NSR. Heart rates varied between 39 and 92 BPM with an average of 55 BPM.  There were rare PVCs totalling 404 and averaging 8.42 per hour. There were 2 couplets  There were very frequent PACs totalling 6207 and averaging 129.31 per hour    Carotid US 5/18/23  There is 0-19% right Internal Carotid Stenosis.  There is 40-49% left Internal Carotid Stenosis. Vessel tortuosity in area of increased velocity (may artifactually suggest stenosis).    Ex stress echo 8/20/18  The patient exercised for 10.5 minutes, corresponding to a functional capacity of 12 estimated METS, achieving a peak heart rate of 142 bpm, which is 97% of the age predicted maximum heart rate.   There were no significant electrocardiographic changes throughout the protocol suggesting ischemia.   EKG Conclusions:   1. The EKG portion of this study is negative for ischemia at a high workload, and peak heart rate of 142 bpm (97% of predicted).   2. Blood pressure response to exercise was normal (Presenting BP: 139/65 Peak BP: 187/82).   3. No significant arrhythmias were present.   4. There were no symptoms of chest discomfort or significant dyspnea throughout the protocol.   Post Exercise Imaging:   Immediate post exercise images demonstrate left ventricular function augmenting.   No exercise induced wall motion abnormalities were identified.   CONCLUSIONS     1 - Normal  left ventricular systolic function (EF 55-60%).     2 - Concentric remodeling.     3 - Mild left atrial enlargement.   No evidence of stress induced myocardial ischemia.       ASSESSMENT:   # ?amaurosis R eye, seems atypical.  Neg optho exam.  Sxs abated.  TRISH 8/2023 neg.  # HTN, controlled  # HLP on atorva 40mg  # ?L carotid atherosclerosis (CUS 5/2023 with ?tortuosity), L carotid bruit  # aortic atherosclerosis (aortic US 7/20/23)    PLAN:   Cont med rx  Cont ASA 81mg qd  Patient has been referred for implantable loop recorder insertion.  All risks benefits of insertion including infection, bleeding, death etc. were discussed in detail with the patient.  Patient is agreeable for insertion.    Follow-up after implantable loop recorder insertion        Alis Bronson MD, FACC, [facility]  Interventional Cardiologist  [facility] ([facility] [place])

## 2023-09-14 ENCOUNTER — TELEPHONE (OUTPATIENT)
Dept: CARDIOLOGY | Facility: CLINIC | Age: 78
End: 2023-09-14
Payer: MEDICARE

## 2023-09-14 NOTE — TELEPHONE ENCOUNTER
----- Message from Augustin White sent at 9/14/2023  2:12 PM CDT -----  Type: Patient Call Back    Who called:Self    What is the request in detail:In regards to getting appt on 9/20 changed    Can the clinic reply by [facility]?No    Would the patient rather a call back or a response via [facility]? Call    Best call back number:[phone]     Additional Information:

## 2023-09-14 NOTE — TELEPHONE ENCOUNTER
Secure chat sent over to Dr. Bronson in regards to this patient needing to reschedule procedure for loop recorder. Informed the patient that I will need to reach back out to him as Dr. Bronson is currently in a meeting.

## 2023-09-18 ENCOUNTER — PATIENT OUTREACH (OUTPATIENT)
Dept: ADMINISTRATIVE | Facility: HOSPITAL | Age: 78
End: 2023-09-18
Payer: MEDICARE

## 2023-09-18 NOTE — PROGRESS NOTES
Updates were requested from care everywhere.  Health Maintenance has been updated.  [place] immunization registry triggered.  Immunizations were reconciled.  Pt declined flu vaccine 09/15/2023

## 2023-09-20 ENCOUNTER — OFFICE VISIT (OUTPATIENT)
Dept: FAMILY MEDICINE | Facility: CLINIC | Age: 78
End: 2023-09-20
Payer: MEDICARE

## 2023-09-20 ENCOUNTER — TELEPHONE (OUTPATIENT)
Dept: CARDIOLOGY | Facility: CLINIC | Age: 78
End: 2023-09-20
Payer: MEDICARE

## 2023-09-20 VITALS
WEIGHT: 169 LBS | DIASTOLIC BLOOD PRESSURE: 54 MMHG | SYSTOLIC BLOOD PRESSURE: 126 MMHG | OXYGEN SATURATION: 97 % | BODY MASS INDEX: 26.53 KG/M2 | HEIGHT: 67 IN | TEMPERATURE: 98 F | HEART RATE: 69 BPM

## 2023-09-20 DIAGNOSIS — J06.9 VIRAL URI: Primary | ICD-10-CM

## 2023-09-20 DIAGNOSIS — I10 ESSENTIAL HYPERTENSION: Chronic | ICD-10-CM

## 2023-09-20 PROCEDURE — 99213 PR OFFICE/OUTPT VISIT, EST, LEVL III, 20-29 MIN: ICD-10-PCS | Mod: S$PBB,,, | Performed by: INTERNAL MEDICINE

## 2023-09-20 PROCEDURE — 99999 PR PBB SHADOW E&M-EST. PATIENT-LVL III: CPT | Mod: PBBFAC,,, | Performed by: INTERNAL MEDICINE

## 2023-09-20 PROCEDURE — 99999 PR PBB SHADOW E&M-EST. PATIENT-LVL III: ICD-10-PCS | Mod: PBBFAC,,, | Performed by: INTERNAL MEDICINE

## 2023-09-20 PROCEDURE — 99213 OFFICE O/P EST LOW 20 MIN: CPT | Mod: S$PBB,,, | Performed by: INTERNAL MEDICINE

## 2023-09-20 PROCEDURE — 99213 OFFICE O/P EST LOW 20 MIN: CPT | Mod: PBBFAC,PO | Performed by: INTERNAL MEDICINE

## 2023-09-20 RX ORDER — FLUTICASONE PROPIONATE 50 MCG
1-2 SPRAY, SUSPENSION (ML) NASAL DAILY PRN
Qty: 16 G | Refills: 2 | Status: SHIPPED | OUTPATIENT
Start: 2023-09-20 | End: 2023-12-14

## 2023-09-20 NOTE — PROGRESS NOTES
Assessment & Plan  Problem List Items Addressed This Visit          Cardiac/Vascular    Essential hypertension (Chronic)    Current Assessment & Plan     The current medical regimen is effective;  continue present plan and medications.           Other Visit Diagnoses       Viral URI    -  Primary  -    I counseled the patient on fluids, rest, OTC medications that can safely be used, hand/cough hygiene, expected course of illness, and when further medical attention would be warranted.       Add nasal Flonase.  We discussed a backup plan of him reaching out this Friday should his symptoms fail to improve.  Certainly if he has any worsening of his symptoms I would like him to reach out to us such as well.  At that time I would consider coverage with antibiotic for bacterial sinus infection    Relevant Medications    fluticasone propionate (FLONASE) 50 mcg/actuation nasal spray              Health Maintenance reviewed, deferred until well.    Follow-up: No follow-ups on file.  ______________________________________________________________________    Chief Complaint  Chief Complaint   Patient presents with    URI     10 days       HPI  Johnathan Waters is a 78 y.o. male with medical diagnoses as listed in the medical history and problem list that presents for URI times 10 days.  Pt is known to me with their last appointment 8/17/2023.      Started off with viral prodrome congestion runny nose subjective fevers and malaise.  Never had overt body aches.  He is having some nonproductive cough.  His biggest concern at this time is continued rhinitis and congestion.  The mucus is somewhat discolored 1st thing in the morning.  There is a little bit of blood tinge to it a 1 point.  He is taking Zyrtec and using nasal saline.      PAST MEDICAL HISTORY:  Past Medical History:   Diagnosis Date    Elevated cholesterol     Hypertension     Squamous cell carcinoma     under right eye.  Sees outside Dermatology    Trigger ring  finger of right hand 1/30/2019       PAST SURGICAL HISTORY:  Past Surgical History:   Procedure Laterality Date    COLONOSCOPY N/A 9/22/2020    Procedure: COLONOSCOPY;  Surgeon: Manuela Ward MD;  Location: [facility] ENDO;  Service: Endoscopy;  Laterality: N/A;    ECHOCARDIOGRAM,TRANSESOPHAGEAL N/A 8/14/2023    Procedure: Transesophageal echo (TRISH) intra-procedure log documentation;  Surgeon: Luis Antonio Small MD;  Location: [facility] CATH LAB;  Service: Cardiology;  Laterality: N/A;  with bubble study  RN PREOP 8/10/2023    EYE SURGERY      TRIGGER FINGER RELEASE Left 3/21/2023    Procedure: RELEASE, TRIGGER FINGER;  Surgeon: Gaby Martinez MD;  Location: [facility] OR;  Service: Orthopedics;  Laterality: Left;  RN PREOP 03/14/2023---NEED H/P    VASECTOMY         SOCIAL HISTORY:  Social History     Socioeconomic History    Marital status:    Occupational History    Occupation: Retired   Tobacco Use    Smoking status: Never    Smokeless tobacco: Never   Substance and Sexual Activity    Alcohol use: Yes     Comment: Rarely    Drug use: No    Sexual activity: Yes     Partners: Female     Comment:      Social Determinants of Health     Financial Resource Strain: Low Risk  (9/19/2023)    Overall Financial Resource Strain (CARDIA)     Difficulty of Paying Living Expenses: Not hard at all   Food Insecurity: No Food Insecurity (9/19/2023)    Hunger Vital Sign     Worried About Running Out of Food in the Last Year: Never true     Ran Out of Food in the Last Year: Never true   Transportation Needs: No Transportation Needs (9/19/2023)    PRAPARE - Transportation     Lack of Transportation (Medical): No     Lack of Transportation (Non-Medical): No   Physical Activity: Sufficiently Active (9/19/2023)    Exercise Vital Sign     Days of Exercise per Week: 5 days     Minutes of Exercise per Session: 60 min   Stress: No Stress Concern Present (9/19/2023)    [employer] [place] [employer] - Occupational Stress  Questionnaire     Feeling of Stress : Not at all   Social Connections: Moderately Integrated (9/19/2023)    Social Connection and Isolation Panel [NHANES]     Frequency of Communication with Friends and Family: Twice a week     Frequency of Social Gatherings with Friends and Family: Once a week     Attends Moravian Services: More than 4 times per year     Active Member of Clubs or Organizations: No     Attends Club or Organization Meetings: Patient refused     Marital Status: Living with partner   Housing Stability: Low Risk  (9/19/2023)    Housing Stability Vital Sign     Unable to Pay for Housing in the Last Year: No     Number of Places Lived in the Last Year: 1     Unstable Housing in the Last Year: No       FAMILY HISTORY:  Family History   Problem Relation Age of Onset    Hypertension Mother     Cancer Father         lung    Diabetes Sister     Asthma Daughter     No Known Problems Daughter        ALLERGIES AND MEDICATIONS: updated and reviewed.  Review of patient's allergies indicates:   Allergen Reactions    Wasp venom Itching and Swelling    Sulfa (sulfonamide antibiotics) Other (See Comments)     Patient unsure, toddler     Current Outpatient Medications   Medication Sig Dispense Refill    aspirin (ECOTRIN) 81 MG EC tablet Take 1 tablet (81 mg total) by mouth once daily. 90 tablet 3    atorvastatin (LIPITOR) 40 MG tablet Take 1 tablet (40 mg total) by mouth once daily. 90 tablet 3    cetirizine (ZYRTEC) 10 MG tablet       clindamycin (CLEOCIN T) 1 % external solution APPLY TOPICALLY TO FACE TWICE DAILY AS NEEDED      lisinopriL-hydrochlorothiazide (PRINZIDE,ZESTORETIC) 10-12.5 mg per tablet Take 1 tablet by mouth once daily. 90 tablet 3    mometasone 0.1% (ELOCON) 0.1 % cream Apply topically daily as needed (rash). 50 g 2    tamsulosin (FLOMAX) 0.4 mg Cap Take 1 capsule (0.4 mg total) by mouth once daily. 90 capsule 3    fluticasone propionate (FLONASE) 50 mcg/actuation nasal spray 1-2 sprays ( mcg  "total) by Each Nostril route daily as needed for Rhinitis (congestion). 16 g 2     Current Facility-Administered Medications   Medication Dose Route Frequency Provider Last Rate Last Admin    sodium chloride 0.9% flush 10 mL  10 mL Intravenous PRN Alis Bronson MD             ROS  Review of Systems   Constitutional:  Negative for activity change and unexpected weight change.   HENT:  Positive for rhinorrhea. Negative for hearing loss and trouble swallowing.    Eyes:  Negative for discharge and visual disturbance.   Respiratory:  Negative for chest tightness and wheezing.    Cardiovascular:  Negative for chest pain and palpitations.   Gastrointestinal:  Negative for blood in stool, constipation, diarrhea and vomiting.   Endocrine: Negative for polydipsia and polyuria.   Genitourinary:  Negative for difficulty urinating, hematuria and urgency.   Musculoskeletal:  Negative for arthralgias, joint swelling and neck pain.   Neurological:  Negative for weakness and headaches.   Psychiatric/Behavioral:  Negative for confusion and dysphoric mood.            Physical Exam  Vitals:    09/20/23 0946   BP: (!) 126/54   BP Location: Right arm   Patient Position: Sitting   BP Method: Medium (Manual)   Pulse: 69   Temp: 98.3 °F (36.8 °C)   TempSrc: Oral   SpO2: 97%   Weight: 76.6 kg (168 lb 15.7 oz)   Height: 5' 7" (1.702 m)    Body mass index is 26.47 kg/m².  Weight: 76.6 kg (168 lb 15.7 oz)   Height: 5' 7" (170.2 cm)   Physical Exam  Constitutional:       General: He is not in acute distress.     Appearance: He is well-developed.   HENT:      Head: Normocephalic and atraumatic.      Right Ear: Tympanic membrane normal.      Left Ear: Tympanic membrane normal.      Nose: Rhinorrhea present. Rhinorrhea is clear.      Left Nostril: Epistaxis (Scant) present.      Right Turbinates: Enlarged and swollen.      Left Turbinates: Enlarged and swollen.      Right Sinus: No maxillary sinus tenderness or frontal sinus tenderness.      " Left Sinus: No maxillary sinus tenderness or frontal sinus tenderness.      Mouth/Throat:      Mouth: Mucous membranes are moist.      Pharynx: Pharyngeal swelling and posterior oropharyngeal erythema present. No oropharyngeal exudate or uvula swelling.      Tonsils: No tonsillar exudate.   Eyes:      General: Lids are normal. No scleral icterus.     Conjunctiva/sclera: Conjunctivae normal.      Pupils: Pupils are equal, round, and reactive to light.   Neck:      Thyroid: No thyromegaly.      Vascular: No carotid bruit or JVD.   Cardiovascular:      Rate and Rhythm: Normal rate and regular rhythm.      Heart sounds: Normal heart sounds. No murmur heard.     No friction rub. No S3 or S4 sounds.   Pulmonary:      Effort: Pulmonary effort is normal.      Breath sounds: Normal breath sounds. No wheezing, rhonchi or rales.   Abdominal:      General: Bowel sounds are normal. There is no distension.      Palpations: Abdomen is soft.      Tenderness: There is no abdominal tenderness.   Musculoskeletal:         General: No tenderness.      Cervical back: Full passive range of motion without pain and neck supple.      Right lower leg: No edema.      Left lower leg: No edema.   Skin:     General: Skin is warm and dry.      Findings: No rash.   Neurological:      Mental Status: He is alert and oriented to person, place, and time.   Psychiatric:         Speech: Speech normal.         Behavior: Behavior normal.         Thought Content: Thought content normal.             Health Maintenance         Date Due Completion Date    COVID-19 Vaccine (5 - Moderna risk series) 12/23/2022 10/28/2022    Aspirin/Antiplatelet Therapy 09/13/2024 9/13/2023    TETANUS VACCINE 12/26/2024 12/26/2014    Lipid Panel 07/26/2028 7/26/2023    Colonoscopy 09/22/2030 9/22/2020    Override on 6/1/2017: Declined (will proceed with stool cards instead)    Override on 3/3/2006: Done (normal.  In [facility] documents)

## 2023-09-20 NOTE — TELEPHONE ENCOUNTER
Patient called and stated that his procedure for 10/04 was canceled and he would like to know why. Spoke with patient and he states that he wants to keep the date of 10/04 for his procedure. Please assist with rescheduling.

## 2023-09-20 NOTE — TELEPHONE ENCOUNTER
----- Message from Bernie Blanco sent at 9/19/2023  4:09 PM CDT -----  Type: Patient Call Back    Who called:pt     What is the request in detail:pt calling to discuss procedure. Call pt     Can the clinic reply by [facility]?    Would the patient rather a call back or a response via [facility]? call    Best call back number:[phone] (home)       Additional Information:

## 2023-09-21 ENCOUNTER — PATIENT MESSAGE (OUTPATIENT)
Dept: FAMILY MEDICINE | Facility: CLINIC | Age: 78
End: 2023-09-21
Payer: MEDICARE

## 2023-09-21 ENCOUNTER — PATIENT MESSAGE (OUTPATIENT)
Dept: CARDIOLOGY | Facility: CLINIC | Age: 78
End: 2023-09-21
Payer: MEDICARE

## 2023-09-21 DIAGNOSIS — J01.90 ACUTE RHINOSINUSITIS: Primary | ICD-10-CM

## 2023-09-22 RX ORDER — DOXYCYCLINE 100 MG/1
100 CAPSULE ORAL EVERY 12 HOURS
Qty: 14 CAPSULE | Refills: 0 | Status: SHIPPED | OUTPATIENT
Start: 2023-09-22 | End: 2023-09-29 | Stop reason: CLARIF

## 2023-09-26 DIAGNOSIS — I63.9 CVA (CEREBRAL VASCULAR ACCIDENT): ICD-10-CM

## 2023-09-26 DIAGNOSIS — I63.9 CEREBROVASCULAR ACCIDENT (CVA), UNSPECIFIED MECHANISM: Primary | ICD-10-CM

## 2023-09-26 RX ORDER — SODIUM CHLORIDE 9 MG/ML
INJECTION, SOLUTION INTRAVENOUS CONTINUOUS
Status: CANCELLED | OUTPATIENT
Start: 2023-09-26

## 2023-09-26 RX ORDER — SODIUM CHLORIDE 0.9 % (FLUSH) 0.9 %
10 SYRINGE (ML) INJECTION
Status: DISCONTINUED | OUTPATIENT
Start: 2023-09-26 | End: 2023-09-29 | Stop reason: CLARIF

## 2023-09-26 RX ORDER — DIPHENHYDRAMINE HCL 25 MG
50 CAPSULE ORAL ONCE
Status: CANCELLED | OUTPATIENT
Start: 2023-09-26 | End: 2023-09-26

## 2023-09-29 ENCOUNTER — HOSPITAL ENCOUNTER (OUTPATIENT)
Dept: PREADMISSION TESTING | Facility: HOSPITAL | Age: 78
Discharge: HOME | End: 2023-09-29
Attending: INTERNAL MEDICINE
Payer: MEDICARE

## 2023-09-29 ENCOUNTER — PATIENT MESSAGE (OUTPATIENT)
Dept: CARDIOLOGY | Facility: HOSPITAL | Age: 78
End: 2023-09-29
Payer: MEDICARE

## 2023-09-29 ENCOUNTER — PATIENT MESSAGE (OUTPATIENT)
Dept: ADMINISTRATIVE | Facility: OTHER | Age: 78
End: 2023-09-29
Payer: MEDICARE

## 2023-09-29 VITALS
SYSTOLIC BLOOD PRESSURE: 125 MMHG | OXYGEN SATURATION: 97 % | RESPIRATION RATE: 18 BRPM | DIASTOLIC BLOOD PRESSURE: 69 MMHG | WEIGHT: 171.06 LBS | BODY MASS INDEX: 26.85 KG/M2 | HEART RATE: 58 BPM | TEMPERATURE: 98 F | HEIGHT: 67 IN

## 2023-09-29 DIAGNOSIS — I63.9 CEREBROVASCULAR ACCIDENT (CVA), UNSPECIFIED MECHANISM: ICD-10-CM

## 2023-09-29 LAB
ANION GAP SERPL CALC-SCNC: 7 MMOL/L (ref 8–16)
BASOPHILS # BLD AUTO: 0.09 K/UL (ref 0–0.2)
BASOPHILS NFR BLD: 1.1 % (ref 0–1.9)
BUN SERPL-MCNC: 17 MG/DL (ref 8–23)
CALCIUM SERPL-MCNC: 8.9 MG/DL (ref 8.7–10.5)
CHLORIDE SERPL-SCNC: 105 MMOL/L (ref 95–110)
CO2 SERPL-SCNC: 27 MMOL/L (ref 23–29)
CREAT SERPL-MCNC: 0.9 MG/DL (ref 0.5–1.4)
DIFFERENTIAL METHOD: ABNORMAL
EOSINOPHIL # BLD AUTO: 0.3 K/UL (ref 0–0.5)
EOSINOPHIL NFR BLD: 3.8 % (ref 0–8)
ERYTHROCYTE [DISTWIDTH] IN BLOOD BY AUTOMATED COUNT: 13.1 % (ref 11.5–14.5)
EST. GFR  (NO RACE VARIABLE): >60 ML/MIN/1.73 M^2
GLUCOSE SERPL-MCNC: 143 MG/DL (ref 70–110)
HCT VFR BLD AUTO: 39.3 % (ref 40–54)
HGB BLD-MCNC: 13.2 G/DL (ref 14–18)
IMM GRANULOCYTES # BLD AUTO: 0.02 K/UL (ref 0–0.04)
IMM GRANULOCYTES NFR BLD AUTO: 0.2 % (ref 0–0.5)
LYMPHOCYTES # BLD AUTO: 1.7 K/UL (ref 1–4.8)
LYMPHOCYTES NFR BLD: 20.3 % (ref 18–48)
MCH RBC QN AUTO: 29.3 PG (ref 27–31)
MCHC RBC AUTO-ENTMCNC: 33.6 G/DL (ref 32–36)
MCV RBC AUTO: 87 FL (ref 82–98)
MONOCYTES # BLD AUTO: 0.8 K/UL (ref 0.3–1)
MONOCYTES NFR BLD: 9.5 % (ref 4–15)
NEUTROPHILS # BLD AUTO: 5.5 K/UL (ref 1.8–7.7)
NEUTROPHILS NFR BLD: 65.1 % (ref 38–73)
NRBC BLD-RTO: 0 /100 WBC
PLATELET # BLD AUTO: 338 K/UL (ref 150–450)
PMV BLD AUTO: 10.9 FL (ref 9.2–12.9)
POTASSIUM SERPL-SCNC: 3.7 MMOL/L (ref 3.5–5.1)
RBC # BLD AUTO: 4.51 M/UL (ref 4.6–6.2)
SODIUM SERPL-SCNC: 139 MMOL/L (ref 136–145)
WBC # BLD AUTO: 8.43 K/UL (ref 3.9–12.7)

## 2023-09-29 PROCEDURE — 36415 COLL VENOUS BLD VENIPUNCTURE: CPT | Performed by: INTERNAL MEDICINE

## 2023-09-29 PROCEDURE — 80048 BASIC METABOLIC PNL TOTAL CA: CPT | Performed by: INTERNAL MEDICINE

## 2023-09-29 PROCEDURE — 85025 COMPLETE CBC W/AUTO DIFF WBC: CPT | Performed by: INTERNAL MEDICINE

## 2023-09-29 NOTE — DISCHARGE INSTRUCTIONS
YOUR PROCEDURE WILL BE AT [facility] at [address] [place] [address]  Before 7 AM, enter through the Emergency Entrance..   After 7 AM enter through the Main Entrance.    Your procedure  is scheduled for _10/4/2023_________.    Call [phone] between 2pm and 5pm on __10/3/2023_____to find out your arrival time for the day of surgery.    You may have two visitors.  No children under 12 years old.     You will be going to the Same Day Surgery Unit on the 2nd floor of the hospital.    Important instructions:  Do not eat anything after midnight.  You may have plain water, non carbonated.  You may also have Gatorade or Powerade after midnight.    Stop all fluids 2 hours before your surgery.    It is okay to brush your teeth.  Do not have gum, candy or mints.    SEE MEDICATION SHEET.   TAKE MEDICATIONS AS DIRECTED WITH SIPS OF WATER.    Please shower the night before and the morning of your surgery.      Use Chlorhexidine soap as instructed by your pre op nurse.   Please place clean linens on your bed the night before surgery. Please wear fresh clean clothing after each shower.    No shaving of procedural area at least 4-5 days before surgery due to increased risk of skin irritation and/or possible infection.    Contact lenses and removable denture work may not be worn during your procedure.    You may wear deodorant only. If you are having breast surgery, do not wear deodorant on the operative side.    Do not wear powder, body lotion, perfume/cologne or make-up.    Do not wear any jewelry or have any metal on your body.    You will be asked to remove any dentures or partials for the procedure.    If you are going home on the same day of surgery, you must arrange for a family member or a friend to drive you home.  Public transportation is prohibited.  You will not be able to drive home if you were given anesthesia or sedation.    Patients who want to have their Post-op prescriptions  filled from our in-house [facility], bring a Credit/Debit Card or cash with you. A co-pay may be required.  The pharmacy closes at 5:30 pm.    Wear loose fitting clothes allowing for bandages.    Please leave money and valuables home.      You may bring your cell phone.    Call the doctor if fever or illness should occur before your surgery.    Call [phone] to contact us here if needed.                            CLOTHES ON DAY OF SURGERY    SHOULDER surgery:  you must have a very oversized shirt.  Very, Very large.  You will probably have a large sling on with your arm strapped to your chest.  You will not be able to put the arm of the operated shoulder into a sleeve.  You can put the arm of the un-operated shoulder into the sleeve, but the shirt will need to be draped over the operated shoulder.       ARM or HAND surgery:  make sure that your sleeves are large and loose enough to pass over large dressings or cast.      BREAST or UNDERARM surgery:  wear a loose, button down shirt so that you can dress without raising your arms over your head.    ABDOMINAL surgery:  wear loose, comfortable clothing.  Nothing tight around the abdomen.  NO JEANS    PENIS or SCROTAL surgery:  loose comfortable clothing.  Large sweat pants, pajama pants or a robe.  ABSOLUTELY NO JEANS      LEG or FOOT surgery:  wear large loose pants that are able to pass over any large dressings or casts.  You could also wear loose shorts or a skirt.

## 2023-09-29 NOTE — PLAN OF CARE
Pre-operative instructions, medication directives and pain scales reviewed with patient. All questions the patient had were answered. Re-assurance about surgical procedure and day of surgery routine given as needed, patient verbalized understanding of the pre-op instructions.  Patient instructed to report to [facility] for surgery.

## 2023-10-03 ENCOUNTER — TELEPHONE (OUTPATIENT)
Dept: SURGERY | Facility: HOSPITAL | Age: 78
End: 2023-10-03
Payer: MEDICARE

## 2023-10-04 ENCOUNTER — HOSPITAL ENCOUNTER (OUTPATIENT)
Facility: HOSPITAL | Age: 78
Discharge: HOME | End: 2023-10-04
Attending: INTERNAL MEDICINE | Admitting: INTERNAL MEDICINE
Payer: MEDICARE

## 2023-10-04 VITALS
HEART RATE: 53 BPM | OXYGEN SATURATION: 98 % | WEIGHT: 171.06 LBS | SYSTOLIC BLOOD PRESSURE: 154 MMHG | BODY MASS INDEX: 26.79 KG/M2 | RESPIRATION RATE: 18 BRPM | TEMPERATURE: 98 F | DIASTOLIC BLOOD PRESSURE: 70 MMHG

## 2023-10-04 DIAGNOSIS — I10 ESSENTIAL HYPERTENSION: Chronic | ICD-10-CM

## 2023-10-04 DIAGNOSIS — M65.342 TRIGGER FINGER, LEFT RING FINGER: ICD-10-CM

## 2023-10-04 DIAGNOSIS — I70.0 AORTIC ATHEROSCLEROSIS: Primary | ICD-10-CM

## 2023-10-04 DIAGNOSIS — M20.22 ACQUIRED HALLUX RIGIDUS OF LEFT FOOT: ICD-10-CM

## 2023-10-04 DIAGNOSIS — E78.2 MIXED HYPERLIPIDEMIA: ICD-10-CM

## 2023-10-04 DIAGNOSIS — I63.9 CEREBROVASCULAR ACCIDENT (CVA), UNSPECIFIED MECHANISM: ICD-10-CM

## 2023-10-04 DIAGNOSIS — N40.1 BENIGN NON-NODULAR PROSTATIC HYPERPLASIA WITH LOWER URINARY TRACT SYMPTOMS: Chronic | ICD-10-CM

## 2023-10-04 DIAGNOSIS — G45.3 AMAUROSIS FUGAX OF RIGHT EYE: ICD-10-CM

## 2023-10-04 DIAGNOSIS — M65.341 TRIGGER RING FINGER OF RIGHT HAND: ICD-10-CM

## 2023-10-04 DIAGNOSIS — I63.9 CVA (CEREBRAL VASCULAR ACCIDENT): ICD-10-CM

## 2023-10-04 PROCEDURE — 33285 PR INSERTION,SUBQ CARDIAC RHYTHM MONITOR, W/PRGRMG: ICD-10-PCS | Mod: ,,, | Performed by: INTERNAL MEDICINE

## 2023-10-04 PROCEDURE — C1764 EVENT RECORDER, CARDIAC: HCPCS | Performed by: INTERNAL MEDICINE

## 2023-10-04 PROCEDURE — 25000003 PHARM REV CODE 250: Performed by: INTERNAL MEDICINE

## 2023-10-04 PROCEDURE — 33285 INSJ SUBQ CAR RHYTHM MNTR: CPT | Performed by: INTERNAL MEDICINE

## 2023-10-04 PROCEDURE — 33285 INSJ SUBQ CAR RHYTHM MNTR: CPT | Mod: ,,, | Performed by: INTERNAL MEDICINE

## 2023-10-04 DEVICE — INSERTABLE CARDIAC MONITOR
Type: IMPLANTABLE DEVICE | Site: CHEST | Status: FUNCTIONAL
Brand: JOT DX™

## 2023-10-04 RX ORDER — MORPHINE SULFATE 4 MG/ML
3 INJECTION, SOLUTION INTRAMUSCULAR; INTRAVENOUS
Status: DISCONTINUED | OUTPATIENT
Start: 2023-10-04 | End: 2023-10-05 | Stop reason: HOSPADM

## 2023-10-04 RX ORDER — SODIUM CHLORIDE 9 MG/ML
INJECTION, SOLUTION INTRAVENOUS CONTINUOUS
Status: DISCONTINUED | OUTPATIENT
Start: 2023-10-04 | End: 2023-10-05 | Stop reason: HOSPADM

## 2023-10-04 RX ORDER — DIPHENHYDRAMINE HCL 25 MG
50 CAPSULE ORAL ONCE
Status: COMPLETED | OUTPATIENT
Start: 2023-10-04 | End: 2023-10-04

## 2023-10-04 RX ORDER — LIDOCAINE HYDROCHLORIDE AND EPINEPHRINE 20; 10 MG/ML; UG/ML
INJECTION, SOLUTION INFILTRATION; PERINEURAL
Status: DISCONTINUED | OUTPATIENT
Start: 2023-10-04 | End: 2023-10-05 | Stop reason: HOSPADM

## 2023-10-04 RX ORDER — OXYCODONE HYDROCHLORIDE 5 MG/1
5 TABLET ORAL EVERY 4 HOURS PRN
Status: DISCONTINUED | OUTPATIENT
Start: 2023-10-04 | End: 2023-10-05 | Stop reason: HOSPADM

## 2023-10-04 RX ORDER — ONDANSETRON 2 MG/ML
4 INJECTION INTRAMUSCULAR; INTRAVENOUS EVERY 12 HOURS PRN
Status: DISCONTINUED | OUTPATIENT
Start: 2023-10-04 | End: 2023-10-05 | Stop reason: HOSPADM

## 2023-10-04 RX ORDER — ACETAMINOPHEN 325 MG/1
650 TABLET ORAL EVERY 4 HOURS PRN
Status: DISCONTINUED | OUTPATIENT
Start: 2023-10-04 | End: 2023-10-05 | Stop reason: HOSPADM

## 2023-10-04 RX ADMIN — DIPHENHYDRAMINE HYDROCHLORIDE 50 MG: 25 CAPSULE ORAL at 10:10

## 2023-10-04 NOTE — INTERVAL H&P NOTE
The patient has been examined and the H&P has been reviewed:    I concur with the findings and no changes have occurred since H&P was written.    Anesthesia/Surgery risks, benefits and alternative options discussed and understood by patient/family.          There are no hospital problems to display for this patient.    
08-Sep-2018

## 2023-10-04 NOTE — DISCHARGE INSTRUCTIONS
DIET: You may resume your home diet.     ACTIVITY LEVEL:  Gradually resume your normal activities    Medications: Pain medication should be taken only if needed and as directed. If antibiotics are prescribed, the  medication should be taken until completed.    CALL THE DOCTOR:  For any obvious bleeding (some dried blood over the incision is normal).  Redness, swelling, foul smell around incision or fever over 101.    If any unusual problems or difficulties occur contact your doctor. If you cannot contact your doctor but  feel your signs and symptoms warrant a physicians attention return to the emergency room.

## 2023-10-09 ENCOUNTER — PATIENT MESSAGE (OUTPATIENT)
Dept: CARDIOLOGY | Facility: CLINIC | Age: 78
End: 2023-10-09
Payer: MEDICARE

## 2023-10-16 ENCOUNTER — TELEPHONE (OUTPATIENT)
Dept: CARDIOLOGY | Facility: CLINIC | Age: 78
End: 2023-10-16
Payer: MEDICARE

## 2023-10-16 ENCOUNTER — PATIENT MESSAGE (OUTPATIENT)
Dept: CARDIOLOGY | Facility: CLINIC | Age: 78
End: 2023-10-16
Payer: MEDICARE

## 2023-10-16 NOTE — TELEPHONE ENCOUNTER
----- Message from Anastasia Rico sent at 10/16/2023  9:13 AM CDT -----  Regarding: self [phone]  .Type: Patient Call Back    Who called:self    What is the request in detail: patient requesting a call back in regards to rescheduling his post op visit on the . He stated he has to go out of town for a .    Can the clinic reply by [facility]? no    Would the patient rather a call back or a response via [facility]? Call back     Best call back number [phone]

## 2023-10-19 DIAGNOSIS — M25.522 LEFT ELBOW PAIN: Primary | ICD-10-CM

## 2023-10-23 ENCOUNTER — OFFICE VISIT (OUTPATIENT)
Dept: ORTHOPEDICS | Facility: CLINIC | Age: 78
End: 2023-10-23
Payer: MEDICARE

## 2023-10-23 ENCOUNTER — TELEPHONE (OUTPATIENT)
Dept: ORTHOPEDICS | Facility: CLINIC | Age: 78
End: 2023-10-23
Payer: MEDICARE

## 2023-10-23 DIAGNOSIS — M25.642 STIFFNESS OF FINGER JOINT OF LEFT HAND: Primary | ICD-10-CM

## 2023-10-23 PROCEDURE — 99999 PR PBB SHADOW E&M-EST. PATIENT-LVL III: CPT | Mod: PBBFAC,,, | Performed by: ORTHOPAEDIC SURGERY

## 2023-10-23 PROCEDURE — 99999 PR PBB SHADOW E&M-EST. PATIENT-LVL III: ICD-10-PCS | Mod: PBBFAC,,, | Performed by: ORTHOPAEDIC SURGERY

## 2023-10-23 PROCEDURE — 99213 PR OFFICE/OUTPT VISIT, EST, LEVL III, 20-29 MIN: ICD-10-PCS | Mod: S$PBB,,, | Performed by: ORTHOPAEDIC SURGERY

## 2023-10-23 PROCEDURE — 99213 OFFICE O/P EST LOW 20 MIN: CPT | Mod: S$PBB,,, | Performed by: ORTHOPAEDIC SURGERY

## 2023-10-23 PROCEDURE — 99213 OFFICE O/P EST LOW 20 MIN: CPT | Mod: PBBFAC,PN | Performed by: ORTHOPAEDIC SURGERY

## 2023-10-23 RX ORDER — MELOXICAM 7.5 MG/1
7.5 TABLET ORAL DAILY
Qty: 30 TABLET | Refills: 0 | Status: SHIPPED | OUTPATIENT
Start: 2023-10-23 | End: 2023-11-14

## 2023-10-23 NOTE — PROGRESS NOTES
Assessment: 78 y.o. male with L elbow pain, hand stiffness    I explained my diagnostic impression and the reasoning behind it in detail, using layman's terms.      Plan:   -  Mobic 7.5 mg PO QD x 2 weeks then PRN. The patient was advised that NSAID-type medications have important potential side effects: gastrointestinal irritation, GI bleeding, cardiac effects and renal injuries. Take the medication with food and to stop and call the office for any GI upset, vomiting, abdominal pain or black/bloody stools. The patient expresses understanding of these issues and questions were answered.  - Can consider OT referral for hand stiffness  - Elbow stretching program, brace PRN  - Hand XR on way out   - Return to clinic in 8 weeks. Return sooner if symptoms worsen or fail to improve.    All questions were answered in detail. The patient is in full agreement with the treatment plan and will proceed accordingly.    Chief Complaint   Patient presents with    Left Elbow - Pain       Initial visit (10/23/23): Johnathan Waters is a 78 y.o. male who presents today complaining of Pain of the Left Elbow     Pain to lateral elbow   Intermittent   Also complains of pain and stiffness to left hand  Recently had a cardiac procedure       This is the extent of the patient's complaints at this time.     Review of patient's allergies indicates:   Allergen Reactions    Wasp venom Itching and Swelling    Sulfa (sulfonamide antibiotics) Other (See Comments)     Patient unsure, toddler         Current Outpatient Medications:     aspirin (ECOTRIN) 81 MG EC tablet, Take 1 tablet (81 mg total) by mouth once daily., Disp: 90 tablet, Rfl: 3    atorvastatin (LIPITOR) 40 MG tablet, Take 1 tablet (40 mg total) by mouth once daily., Disp: 90 tablet, Rfl: 3    cetirizine (ZYRTEC) 10 MG tablet, , Disp: , Rfl:     clindamycin (CLEOCIN T) 1 % external solution, APPLY TOPICALLY TO FACE TWICE DAILY AS NEEDED, Disp: , Rfl:     fluticasone propionate  (FLONASE) 50 mcg/actuation nasal spray, 1-2 sprays ( mcg total) by Each Nostril route daily as needed for Rhinitis (congestion)., Disp: 16 g, Rfl: 2    lisinopriL-hydrochlorothiazide (PRINZIDE,ZESTORETIC) 10-12.5 mg per tablet, Take 1 tablet by mouth once daily., Disp: 90 tablet, Rfl: 3    mometasone 0.1% (ELOCON) 0.1 % cream, Apply topically daily as needed (rash)., Disp: 50 g, Rfl: 2    tamsulosin (FLOMAX) 0.4 mg Cap, Take 1 capsule (0.4 mg total) by mouth once daily., Disp: 90 capsule, Rfl: 3    Physical Exam:   Vitals:    10/23/23 1059   PainSc: 0-No pain       General: Patient is alert, awake and oriented to time, place and person. Mood and affect are appropriate.  Patient does not appear to be in any distress, denies any constitutional symptoms and appears stated age.   HEENT:  Pupils are equal and round, sclera are not injected. External examination of ears and nose reveals no abnormalities. Cranial nerves II-X are grossly intact  Skin:  no rashes, abrasions or open wounds on the affected extremity   Resp:  No respiratory distress or audible wheezing   CV: 2+  pulses, all extremities warm and well perfused   Left Upper extremity   TTP over ECRB  No pain with resisted wrist extension  Neg tinels over PIN      Imaging: 3 views of the left elbow negative for fractures, degenerative changes     I personally reviewed and interpreted the patient's imaging obtained today in clinic       This note was created by combination of typed  and M-Modal dictation. Transcription and phonetic errors may be present.  If there are any questions, please contact me.    Past Medical History:   Diagnosis Date    Elevated cholesterol     Hypertension     Squamous cell carcinoma     under right eye.  Sees outside Dermatology    Trigger ring finger of right hand 1/30/2019       Active Problem List with Overview Notes    Diagnosis Date Noted    Aortic atherosclerosis 08/09/2023    Amaurosis fugax of right eye 07/06/2023     Trigger finger, left ring finger 03/21/2023    Acquired hallux rigidus of left foot 01/09/2020    Trigger ring finger of right hand 01/30/2019    Benign non-nodular prostatic hyperplasia with lower urinary tract symptoms 05/30/2016    Mixed hyperlipidemia 05/11/2015    Essential hypertension 05/10/2013       Past Surgical History:   Procedure Laterality Date    COLONOSCOPY N/A 9/22/2020    Procedure: COLONOSCOPY;  Surgeon: Manuela Ward MD;  Location: [facility] ENDO;  Service: Endoscopy;  Laterality: N/A;    ECHOCARDIOGRAM,TRANSESOPHAGEAL N/A 8/14/2023    Procedure: Transesophageal echo (TRISH) intra-procedure log documentation;  Surgeon: Luis Antonio Small MD;  Location: [facility] CATH LAB;  Service: Cardiology;  Laterality: N/A;  with bubble study  RN PREOP 8/10/2023    EYE SURGERY      INSERTION OF IMPLANTABLE LOOP RECORDER N/A 10/4/2023    Procedure: Insertion, Implantable Loop Recorder;  Surgeon: Alis Bronson MD;  Location: [facility] CATH LAB;  Service: Cardiology;  Laterality: N/A;  RN PREOP 9/29/2023---INCOMPLETE CONSENT---md notified--pt    TRIGGER FINGER RELEASE Left 3/21/2023    Procedure: RELEASE, TRIGGER FINGER;  Surgeon: Gaby Martinez MD;  Location: [facility] OR;  Service: Orthopedics;  Laterality: Left;  RN PREOP 03/14/2023---NEED H/P    VASECTOMY         Family History   Problem Relation Age of Onset    Hypertension Mother     Cancer Father         lung    Diabetes Sister     Asthma Daughter     No Known Problems Daughter

## 2023-10-23 NOTE — TELEPHONE ENCOUNTER
Spoken with patient to let him know that he can the take the medication : mobic for a short term patient understand what was told to him if there are any question please call the clinic

## 2023-10-23 NOTE — TELEPHONE ENCOUNTER
----- Message from Gaby Martinez MD sent at 10/23/2023  2:29 PM CDT -----  Can you let him know Dr Davalos is ok with the meloxicam for short term use   ----- Message -----  From: Bernard Davalos MD  Sent: 10/23/2023  11:30 AM CDT  To: Gaby Martinez MD    Yes all good.      If it is going to be long term, naproxen has the best cardiovascular data.  Otherwise, choose whichever is your preferred short term one.     Sincerely,  Jay  ----- Message -----  From: Gaby Martinez MD  Sent: 10/23/2023  11:25 AM CDT  To: MD Felipe Carrillo,   Is he ok to take NSAIDs?  Thanks,   Gaby

## 2023-10-27 NOTE — PROGRESS NOTES
Patient had to cancel appointment with Dr. Bronson. Once rescheduled, Dr. Bronson had to cancel his appointment. He is now scheduled to see a different cardiologist in December.

## 2023-11-04 ENCOUNTER — NURSE TRIAGE (OUTPATIENT)
Dept: ADMINISTRATIVE | Facility: CLINIC | Age: 78
End: 2023-11-04
Payer: MEDICARE

## 2023-11-04 RX ORDER — NIRMATRELVIR AND RITONAVIR 300-100 MG
KIT ORAL
Qty: 1 TABLET | Refills: 0 | Status: SHIPPED | OUTPATIENT
Start: 2023-11-04 | End: 2023-12-22

## 2023-11-04 NOTE — TELEPHONE ENCOUNTER
Pt reports started having a cough last night that has worsened today and feeling fatigued, took a Covid home test and it is positive. Temp 100, Pox 97-98%, denies any chest discomfort or breathing issues. Pt is interested in taking Paxlovid. Pt advised to call PCP within 24 hours per protocol, but since office will be closed due to the weekend a call was placed to [facility] ON Call MD, Dr. Taylor, who authorized for Paxlovid dose pack to be called into pt's pharmacy. Pt informed and encouraged to call back with any worsening symptoms or questions. Pt verbalized understanding.  Covid Risk Score:  4     Reason for Disposition   [1] HIGH RISK patient (e.g., weak immune system, age > 64 years, obesity with BMI 30 or higher, pregnant, chronic lung disease or other chronic medical condition) AND [2] COVID symptoms (e.g., cough, fever)  (Exceptions: Already seen by PCP and no new or worsening symptoms.)    Additional Information   Negative: SEVERE difficulty breathing (e.g., struggling for each breath, speaks in single words)   Negative: Difficult to awaken or acting confused (e.g., disoriented, slurred speech)   Negative: Bluish (or gray) lips or face now   Negative: Shock suspected (e.g., cold/pale/clammy skin, too weak to stand, low BP, rapid pulse)   Negative: Sounds like a life-threatening emergency to the triager   Negative: SEVERE or constant chest pain or pressure  (Exception: Mild central chest pain, present only when coughing.)   Negative: MODERATE difficulty breathing (e.g., speaks in phrases, SOB even at rest, pulse 100-120)   Negative: [1] Headache AND [2] stiff neck (can't touch chin to chest)   Negative: Oxygen level (e.g., pulse oximetry) 90 percent or lower   Negative: Chest pain or pressure  (Exception: MILD central chest pain, present only when coughing.)   Negative: [1] Drinking very little AND [2] dehydration suspected (e.g., no urine > 12 hours, very dry mouth, very lightheaded)   Negative:  Patient sounds very sick or weak to the triager   Negative: MILD difficulty breathing (e.g., minimal/no SOB at rest, SOB with walking, pulse <100)   Negative: Oxygen level (e.g., pulse oximetry) 91 to 94 percent   Negative: Fever > 103 F (39.4 C)   Negative: [1] Fever > 101 F (38.3 C) AND [2] age > 60 years   Negative: [1] Fever > 100.0 F (37.8 C) AND [2] bedridden (e.g., CVA, chronic illness, recovering from surgery)    Protocols used: Coronavirus (COVID-19) Diagnosed or [id number]

## 2023-11-06 ENCOUNTER — PATIENT MESSAGE (OUTPATIENT)
Dept: FAMILY MEDICINE | Facility: CLINIC | Age: 78
End: 2023-11-06
Payer: MEDICARE

## 2023-11-06 NOTE — TELEPHONE ENCOUNTER
"Follow up call was placed to patient who stated that the Paxolovid medication was retrieved related to positive covid result. Patient started medication and "doing fine". Verbalized understanding and thank you.   "

## 2023-11-10 ENCOUNTER — PATIENT MESSAGE (OUTPATIENT)
Dept: ORTHOPEDICS | Facility: CLINIC | Age: 78
End: 2023-11-10
Payer: MEDICARE

## 2023-11-13 PROBLEM — G45.3 AMAUROSIS FUGAX OF RIGHT EYE: Status: RESOLVED | Noted: 2023-07-06 | Resolved: 2023-11-13

## 2023-11-14 RX ORDER — MELOXICAM 7.5 MG/1
7.5 TABLET ORAL
Qty: 30 TABLET | Refills: 0 | Status: SHIPPED | OUTPATIENT
Start: 2023-11-14 | End: 2023-12-15

## 2023-12-06 ENCOUNTER — HOSPITAL ENCOUNTER (OUTPATIENT)
Dept: CARDIOLOGY | Facility: HOSPITAL | Age: 78
Discharge: HOME | End: 2023-12-06
Attending: INTERNAL MEDICINE
Payer: MEDICARE

## 2023-12-06 DIAGNOSIS — I65.22 CAROTID ATHEROSCLEROSIS, LEFT: ICD-10-CM

## 2023-12-06 LAB
LEFT CBA DIAS: 14 CM/S
LEFT CBA SYS: 73 CM/S
LEFT CCA DIST DIAS: 13 CM/S
LEFT CCA DIST SYS: 71 CM/S
LEFT CCA MID DIAS: 13 CM/S
LEFT CCA MID SYS: 71 CM/S
LEFT CCA PROX DIAS: 17 CM/S
LEFT CCA PROX SYS: 114 CM/S
LEFT ECA DIAS: 10 CM/S
LEFT ECA SYS: 102 CM/S
LEFT ICA DIST DIAS: 24 CM/S
LEFT ICA DIST SYS: 102 CM/S
LEFT ICA MID DIAS: 20 CM/S
LEFT ICA MID SYS: 68 CM/S
LEFT ICA PROX DIAS: 26 CM/S
LEFT ICA PROX SYS: 89 CM/S
LEFT VERTEBRAL DIAS: 12 CM/S
LEFT VERTEBRAL SYS: 96 CM/S
OHS CV CAROTID RIGHT ICA EDV HIGHEST: 30
OHS CV CAROTID ULTRASOUND LEFT ICA/CCA RATIO: 1.44
OHS CV CAROTID ULTRASOUND RIGHT ICA/CCA RATIO: 1.51
OHS CV PV CAROTID LEFT HIGHEST CCA: 114
OHS CV PV CAROTID LEFT HIGHEST ICA: 102
OHS CV PV CAROTID RIGHT HIGHEST CCA: 97
OHS CV PV CAROTID RIGHT HIGHEST ICA: 104
OHS CV US CAROTID LEFT HIGHEST EDV: 26
RIGHT CBA DIAS: 11 CM/S
RIGHT CBA SYS: 59 CM/S
RIGHT CCA DIST DIAS: 13 CM/S
RIGHT CCA DIST SYS: 69 CM/S
RIGHT CCA MID DIAS: 11 CM/S
RIGHT CCA MID SYS: 78 CM/S
RIGHT CCA PROX DIAS: 11 CM/S
RIGHT CCA PROX SYS: 97 CM/S
RIGHT ECA DIAS: 3 CM/S
RIGHT ECA SYS: 101 CM/S
RIGHT ICA DIST DIAS: 11 CM/S
RIGHT ICA DIST SYS: 98 CM/S
RIGHT ICA MID DIAS: 30 CM/S
RIGHT ICA MID SYS: 104 CM/S
RIGHT ICA PROX DIAS: 20 CM/S
RIGHT ICA PROX SYS: 79 CM/S
RIGHT VERTEBRAL DIAS: 23 CM/S
RIGHT VERTEBRAL SYS: 75 CM/S

## 2023-12-06 PROCEDURE — 93880 EXTRACRANIAL BILAT STUDY: CPT

## 2023-12-06 PROCEDURE — 93880 EXTRACRANIAL BILAT STUDY: CPT | Mod: 26,,, | Performed by: INTERNAL MEDICINE

## 2023-12-06 PROCEDURE — 93880 CV US DOPPLER CAROTID (CUPID ONLY): ICD-10-PCS | Mod: 26,,, | Performed by: INTERNAL MEDICINE

## 2023-12-14 ENCOUNTER — PATIENT MESSAGE (OUTPATIENT)
Dept: FAMILY MEDICINE | Facility: CLINIC | Age: 78
End: 2023-12-14
Payer: MEDICARE

## 2023-12-14 DIAGNOSIS — J06.9 VIRAL URI: ICD-10-CM

## 2023-12-14 RX ORDER — FLUTICASONE PROPIONATE 50 MCG
SPRAY, SUSPENSION (ML) NASAL
Qty: 54 G | Refills: 3 | Status: SHIPPED | OUTPATIENT
Start: 2023-12-14

## 2023-12-14 NOTE — TELEPHONE ENCOUNTER
No care due was identified.  [facility] Embedded Care Due Messages. Reference number: [id number].   12/14/2023 8:10:57 AM CST

## 2023-12-14 NOTE — TELEPHONE ENCOUNTER
Refill Routing Note   Medication(s) are not appropriate for processing by [facility] for the following reason(s):        New or recently adjusted medication    [facility] action(s):  Defer             Appointments  past 12m or future 3m with PCP    Date Provider   Last Visit   9/20/2023 Bernard Davalos MD   Next Visit   Visit date not found Bernard Davalos MD   ED visits in past 90 days: 0        Note composed:8:27 AM 12/14/2023

## 2023-12-15 RX ORDER — MELOXICAM 7.5 MG/1
7.5 TABLET ORAL
Qty: 30 TABLET | Refills: 0 | Status: SHIPPED | OUTPATIENT
Start: 2023-12-15

## 2023-12-20 ENCOUNTER — OFFICE VISIT (OUTPATIENT)
Dept: ORTHOPEDICS | Facility: CLINIC | Age: 78
End: 2023-12-20
Payer: MEDICARE

## 2023-12-20 DIAGNOSIS — M25.522 LEFT ELBOW PAIN: Primary | ICD-10-CM

## 2023-12-20 DIAGNOSIS — M25.642 STIFFNESS OF FINGER JOINT OF LEFT HAND: ICD-10-CM

## 2023-12-20 DIAGNOSIS — M25.642 STIFFNESS OF FINGER JOINT OF LEFT HAND: Primary | ICD-10-CM

## 2023-12-20 DIAGNOSIS — M25.522 LEFT ELBOW PAIN: ICD-10-CM

## 2023-12-20 PROCEDURE — 99999 PR PBB SHADOW E&M-EST. PATIENT-LVL III: ICD-10-PCS | Mod: PBBFAC,,, | Performed by: ORTHOPAEDIC SURGERY

## 2023-12-20 PROCEDURE — 99213 OFFICE O/P EST LOW 20 MIN: CPT | Mod: S$PBB,,, | Performed by: ORTHOPAEDIC SURGERY

## 2023-12-20 PROCEDURE — 99213 PR OFFICE/OUTPT VISIT, EST, LEVL III, 20-29 MIN: ICD-10-PCS | Mod: S$PBB,,, | Performed by: ORTHOPAEDIC SURGERY

## 2023-12-20 PROCEDURE — 99213 OFFICE O/P EST LOW 20 MIN: CPT | Mod: PBBFAC,PN | Performed by: ORTHOPAEDIC SURGERY

## 2023-12-20 PROCEDURE — 99999 PR PBB SHADOW E&M-EST. PATIENT-LVL III: CPT | Mod: PBBFAC,,, | Performed by: ORTHOPAEDIC SURGERY

## 2023-12-20 NOTE — PROGRESS NOTES
Assessment: 78 y.o. male with L elbow pain, hand stiffness    I explained my diagnostic impression and the reasoning behind it in detail, using layman's terms.      Plan:   -  Mobic 7.5 mg PRN  - OT referral for hand stiffness and lateral epicondylitis   - Brace   - Return to clinic in 8 weeks. Return sooner if symptoms worsen or fail to improve.    All questions were answered in detail. The patient is in full agreement with the treatment plan and will proceed accordingly.    Chief Complaint   Patient presents with    Left Elbow - Pain       Initial visit (10/23/23): Johnathan Waters is a 78 y.o. male who presents today complaining of Pain of the Left Elbow     Pain to lateral elbow   Intermittent   Also complains of pain and stiffness to left hand - tripped in some grass and landed on outstretched hand. Has stiffness of the LIF PIP  Recently had a cardiac procedure     12/20/23   Here for follow up of hand stiffness and lateral epicondylitis  Somewhat better in elbow but still has pain  No improvement in hand ROM with HEP      This is the extent of the patient's complaints at this time.     Review of patient's allergies indicates:   Allergen Reactions    Wasp venom Itching and Swelling    Sulfa (sulfonamide antibiotics) Other (See Comments)     Patient unsure, toddler         Current Outpatient Medications:     aspirin (ECOTRIN) 81 MG EC tablet, Take 1 tablet (81 mg total) by mouth once daily., Disp: 90 tablet, Rfl: 3    atorvastatin (LIPITOR) 40 MG tablet, Take 1 tablet (40 mg total) by mouth once daily., Disp: 90 tablet, Rfl: 3    cetirizine (ZYRTEC) 10 MG tablet, , Disp: , Rfl:     clindamycin (CLEOCIN T) 1 % external solution, APPLY TOPICALLY TO FACE TWICE DAILY AS NEEDED, Disp: , Rfl:     fluticasone propionate (FLONASE) 50 mcg/actuation nasal spray, SHAKE LIQUID AND USE 1 TO 2 SPRAYS(50  MCG) IN EACH NOSTRIL DAILY AS NEEDED FOR RHINITIS OR CONGESTION, Disp: 54 g, Rfl: 3     lisinopriL-hydrochlorothiazide (PRINZIDE,ZESTORETIC) 10-12.5 mg per tablet, Take 1 tablet by mouth once daily., Disp: 90 tablet, Rfl: 3    meloxicam (MOBIC) 7.5 MG tablet, TAKE 1 TABLET(7.5 MG) BY MOUTH EVERY DAY, Disp: 30 tablet, Rfl: 0    mometasone 0.1% (ELOCON) 0.1 % cream, Apply topically daily as needed (rash)., Disp: 50 g, Rfl: 2    nirmatrelvir-ritonavir (PAXLOVID) 300 mg (150 mg x 2)-100 mg copackaged tablets (EUA), Take 3 tablets by mouth 2 (two) times daily. Each dose contains 2 nirmatrelvir (pink tablets) and 1 ritonavir (white tablet). Take all 3 tablets together, Disp: 1 tablet, Rfl: 0    tamsulosin (FLOMAX) 0.4 mg Cap, Take 1 capsule (0.4 mg total) by mouth once daily., Disp: 90 capsule, Rfl: 3    Physical Exam:   Vitals:    12/20/23 1100   PainSc: 0-No pain       General: Patient is alert, awake and oriented to time, place and person. Mood and affect are appropriate.  Patient does not appear to be in any distress, denies any constitutional symptoms and appears stated age.   HEENT:  Pupils are equal and round, sclera are not injected. External examination of ears and nose reveals no abnormalities. Cranial nerves II-X are grossly intact  Skin:  no rashes, abrasions or open wounds on the affected extremity   Resp:  No respiratory distress or audible wheezing   CV: 2+  pulses, all extremities warm and well perfused   Left Upper extremity   TTP over ECRB  No pain with resisted wrist extension  Neg tinels over PIN   Continued stiffness at the PIP joint of the left index finger       Imaging: 3 views of the left elbow negative for fractures, degenerative changes   Three views of the left hand show degenerative changes at the left index PIP joint    I personally reviewed and interpreted the patient's imaging obtained prior to visit       This note was created by combination of typed  and M-Modal dictation. Transcription and phonetic errors may be present.  If there are any questions, please contact  me.    Past Medical History:   Diagnosis Date    Elevated cholesterol     Hypertension     Squamous cell carcinoma     under right eye.  Sees outside Dermatology    Trigger ring finger of right hand 1/30/2019       Active Problem List with Overview Notes    Diagnosis Date Noted    Aortic atherosclerosis 08/09/2023    Trigger finger, left ring finger 03/21/2023    Acquired hallux rigidus of left foot 01/09/2020    Trigger ring finger of right hand 01/30/2019    Benign non-nodular prostatic hyperplasia with lower urinary tract symptoms 05/30/2016    Mixed hyperlipidemia 05/11/2015    Essential hypertension 05/10/2013       Past Surgical History:   Procedure Laterality Date    COLONOSCOPY N/A 9/22/2020    Procedure: COLONOSCOPY;  Surgeon: Manuela Ward MD;  Location: [facility] ENDO;  Service: Endoscopy;  Laterality: N/A;    ECHOCARDIOGRAM,TRANSESOPHAGEAL N/A 8/14/2023    Procedure: Transesophageal echo (TRISH) intra-procedure log documentation;  Surgeon: Luis Antonio Small MD;  Location: [facility] CATH LAB;  Service: Cardiology;  Laterality: N/A;  with bubble study  RN PREOP 8/10/2023    EYE SURGERY      INSERTION OF IMPLANTABLE LOOP RECORDER N/A 10/4/2023    Procedure: Insertion, Implantable Loop Recorder;  Surgeon: Alis Bronson MD;  Location: [facility] CATH LAB;  Service: Cardiology;  Laterality: N/A;  RN PREOP 9/29/2023---INCOMPLETE CONSENT---md notified--pt    TRIGGER FINGER RELEASE Left 3/21/2023    Procedure: RELEASE, TRIGGER FINGER;  Surgeon: Gaby Martinez MD;  Location: [facility] OR;  Service: Orthopedics;  Laterality: Left;  RN PREOP 03/14/2023---NEED H/P    VASECTOMY         Family History   Problem Relation Age of Onset    Hypertension Mother     Cancer Father         lung    Diabetes Sister     Asthma Daughter     No Known Problems Daughter

## 2023-12-22 ENCOUNTER — OFFICE VISIT (OUTPATIENT)
Dept: CARDIOLOGY | Facility: CLINIC | Age: 78
End: 2023-12-22
Attending: INTERNAL MEDICINE
Payer: MEDICARE

## 2023-12-22 VITALS
WEIGHT: 169.75 LBS | OXYGEN SATURATION: 98 % | RESPIRATION RATE: 18 BRPM | SYSTOLIC BLOOD PRESSURE: 120 MMHG | HEART RATE: 62 BPM | HEIGHT: 67 IN | BODY MASS INDEX: 26.64 KG/M2 | DIASTOLIC BLOOD PRESSURE: 54 MMHG

## 2023-12-22 DIAGNOSIS — I70.0 AORTIC ATHEROSCLEROSIS: ICD-10-CM

## 2023-12-22 DIAGNOSIS — Z95.818 STATUS POST PLACEMENT OF IMPLANTABLE LOOP RECORDER: ICD-10-CM

## 2023-12-22 DIAGNOSIS — Z86.69 HISTORY OF AMAUROSIS FUGAX: Primary | ICD-10-CM

## 2023-12-22 DIAGNOSIS — E78.2 MIXED HYPERLIPIDEMIA: ICD-10-CM

## 2023-12-22 DIAGNOSIS — I10 ESSENTIAL HYPERTENSION: ICD-10-CM

## 2023-12-22 PROCEDURE — 99214 OFFICE O/P EST MOD 30 MIN: CPT | Mod: S$PBB,,, | Performed by: INTERNAL MEDICINE

## 2023-12-22 PROCEDURE — 99213 OFFICE O/P EST LOW 20 MIN: CPT | Mod: PBBFAC,PO | Performed by: INTERNAL MEDICINE

## 2023-12-22 PROCEDURE — 99999 PR PBB SHADOW E&M-EST. PATIENT-LVL III: ICD-10-PCS | Mod: PBBFAC,,, | Performed by: INTERNAL MEDICINE

## 2023-12-22 PROCEDURE — 99214 PR OFFICE/OUTPT VISIT, EST, LEVL IV, 30-39 MIN: ICD-10-PCS | Mod: S$PBB,,, | Performed by: INTERNAL MEDICINE

## 2023-12-22 PROCEDURE — 99999 PR PBB SHADOW E&M-EST. PATIENT-LVL III: CPT | Mod: PBBFAC,,, | Performed by: INTERNAL MEDICINE

## 2023-12-22 NOTE — PROGRESS NOTES
CARDIOVASCULAR CONSULTATION    REASON FOR CONSULT:   Johnathan Waters is a 78 y.o. male who presents for f/u ?R eye amaurosis, ILR placement.    PCP: Susannah  Neuro: Kalpana  HISTORY OF PRESENT ILLNESS:   The patient returns for follow up.  In the interim since his last visit, he underwent implantable loop recorder placement.  Review of reports note no evidence of arrhythmia, specifically no evidence of atrial fibrillation.  He does have occasional PACs with bradycardia.  He denies angina, dyspnea, palpitations, or syncope.  He is exercising without any difficulty.  There has been no PND, orthopnea, melena, hematuria, or claudication symptoms.  There have been no symptoms of stroke or TIA nor recurrent right eye amaurosis.    CARDIOVASCULAR HISTORY:   ?L car atherosclerosis vs tortuous vessel (CUS 5/2023)    Hx R eye amarurosis    ILR 10/2023    PAST MEDICAL HISTORY:     Past Medical History:   Diagnosis Date    Elevated cholesterol     Hypertension     Squamous cell carcinoma     under right eye.  Sees outside Dermatology    Trigger ring finger of right hand 1/30/2019       PAST SURGICAL HISTORY:     Past Surgical History:   Procedure Laterality Date    COLONOSCOPY N/A 9/22/2020    Procedure: COLONOSCOPY;  Surgeon: Manuela Ward MD;  Location: [facility] ENDO;  Service: Endoscopy;  Laterality: N/A;    ECHOCARDIOGRAM,TRANSESOPHAGEAL N/A 8/14/2023    Procedure: Transesophageal echo (TRISH) intra-procedure log documentation;  Surgeon: Luis Antonio Small MD;  Location: [facility] CATH LAB;  Service: Cardiology;  Laterality: N/A;  with bubble study  RN PREOP 8/10/2023    EYE SURGERY      INSERTION OF IMPLANTABLE LOOP RECORDER N/A 10/4/2023    Procedure: Insertion, Implantable Loop Recorder;  Surgeon: Alis Bronson MD;  Location: [facility] CATH LAB;  Service: Cardiology;  Laterality: N/A;  RN PREOP 9/29/2023---INCOMPLETE CONSENT---md notified--pt    TRIGGER FINGER RELEASE Left 3/21/2023    Procedure: RELEASE, TRIGGER  FINGER;  Surgeon: Gaby Martinez MD;  Location: [facility];  Service: Orthopedics;  Laterality: Left;  RN PREOP 03/14/2023---NEED H/P    VASECTOMY         ALLERGIES AND MEDICATION:     Review of patient's allergies indicates:   Allergen Reactions    Wasp venom Itching and Swelling    Sulfa (sulfonamide antibiotics) Other (See Comments)     Patient unsure, toddler        Medication List            Accurate as of December 22, 2023 11:05 AM. If you have any questions, ask your nurse or doctor.                CONTINUE taking these medications      aspirin 81 MG EC tablet  Commonly known as: ECOTRIN  Take 1 tablet (81 mg total) by mouth once daily.     atorvastatin 40 MG tablet  Commonly known as: LIPITOR  Take 1 tablet (40 mg total) by mouth once daily.     cetirizine 10 MG tablet  Commonly known as: ZYRTEC     clindamycin 1 % external solution  Commonly known as: CLEOCIN T     fluticasone propionate 50 mcg/actuation nasal spray  Commonly known as: FLONASE  SHAKE LIQUID AND USE 1 TO 2 SPRAYS(50  MCG) IN EACH NOSTRIL DAILY AS NEEDED FOR RHINITIS OR CONGESTION     lisinopriL-hydrochlorothiazide 10-12.5 mg per tablet  Commonly known as: PRINZIDE,ZESTORETIC  Take 1 tablet by mouth once daily.     meloxicam 7.5 MG tablet  Commonly known as: MOBIC  TAKE 1 TABLET(7.5 MG) BY MOUTH EVERY DAY     mometasone 0.1% 0.1 % cream  Commonly known as: ELOCON  Apply topically daily as needed (rash).     tamsulosin 0.4 mg Cap  Commonly known as: FLOMAX  Take 1 capsule (0.4 mg total) by mouth once daily.            STOP taking these medications      PAXLOVID 300 mg (150 mg x 2)-100 mg copackaged tablets (EUA)  Generic drug: nirmatrelvir-ritonavir  Stopped by: Luis Antonio Small MD              SOCIAL HISTORY:     Social History     Socioeconomic History    Marital status:    Occupational History    Occupation: Retired   Tobacco Use    Smoking status: Never    Smokeless tobacco: Never   Substance and Sexual Activity    Alcohol  use: Yes     Comment: Rarely    Drug use: No    Sexual activity: Yes     Partners: Female     Comment:      Social Determinants of Health     Financial Resource Strain: Low Risk  (12/14/2023)    Overall Financial Resource Strain (CARDIA)     Difficulty of Paying Living Expenses: Not hard at all   Food Insecurity: No Food Insecurity (12/14/2023)    Hunger Vital Sign     Worried About Running Out of Food in the Last Year: Never true     Ran Out of Food in the Last Year: Never true   Transportation Needs: No Transportation Needs (12/14/2023)    PRAPARE - Transportation     Lack of Transportation (Medical): No     Lack of Transportation (Non-Medical): No   Physical Activity: Sufficiently Active (12/14/2023)    Exercise Vital Sign     Days of Exercise per Week: 4 days     Minutes of Exercise per Session: 60 min   Stress: No Stress Concern Present (12/14/2023)    [employer] - Occupational Stress Questionnaire     Feeling of Stress : Not at all   Social Connections: Socially Integrated (12/14/2023)    Social Connection and Isolation Panel [NHANES]     Frequency of Communication with Friends and Family: Three times a week     Frequency of Social Gatherings with Friends and Family: Once a week     Attends Voodoo Services: More than 4 times per year     Active Member of Clubs or Organizations: Yes     Attends Club or Organization Meetings: 1 to 4 times per year     Marital Status:    Housing Stability: Low Risk  (12/14/2023)    Housing Stability Vital Sign     Unable to Pay for Housing in the Last Year: No     Number of Places Lived in the Last Year: 1     Unstable Housing in the Last Year: No       FAMILY HISTORY:     Family History   Problem Relation Age of Onset    Hypertension Mother     Cancer Father         lung    Diabetes Sister     Asthma Daughter     No Known Problems Daughter        REVIEW OF SYSTEMS:   Review of Systems   Constitutional:  Negative for chills,  "diaphoresis and fever.   HENT:  Negative for nosebleeds.    Eyes:  Negative for blurred vision, double vision and photophobia.        Brief R eye lower field visual loss   Respiratory:  Negative for hemoptysis, shortness of breath and wheezing.    Cardiovascular:  Negative for chest pain, palpitations, orthopnea, claudication, leg swelling and PND.   Gastrointestinal:  Negative for abdominal pain, blood in stool, heartburn, melena, nausea and vomiting.   Genitourinary:  Negative for flank pain and hematuria.   Musculoskeletal:  Negative for falls, myalgias and neck pain.   Skin:  Negative for rash.   Neurological:  Negative for dizziness, seizures, loss of consciousness, weakness and headaches.   Endo/Heme/Allergies:  Negative for polydipsia. Does not bruise/bleed easily.   Psychiatric/Behavioral:  Negative for depression and memory loss. The patient is not nervous/anxious.        PHYSICAL EXAM:     Vitals:    12/22/23 1059   BP: (!) 120/54   Pulse: 62   Resp: 18    Body mass index is 26.59 kg/m².  Weight: 77 kg (169 lb 12.1 oz)   Height: 5' 7" (170.2 cm)     Physical Exam  Vitals reviewed.   Constitutional:       General: He is not in acute distress.     Appearance: Normal appearance. He is well-developed and normal weight. He is not ill-appearing, toxic-appearing or diaphoretic.   HENT:      Head: Normocephalic and atraumatic.   Eyes:      General: No scleral icterus.     Extraocular Movements: Extraocular movements intact.      Conjunctiva/sclera: Conjunctivae normal.      Pupils: Pupils are equal, round, and reactive to light.   Neck:      Thyroid: No thyromegaly.      Vascular: Normal carotid pulses. Carotid bruit present. No JVD.      Trachea: Trachea normal.   Cardiovascular:      Rate and Rhythm: Normal rate and regular rhythm.      Pulses:           Carotid pulses are 2+ on the right side and 2+ on the left side with bruit.     Heart sounds: S1 normal and S2 normal. Murmur heard.      Systolic murmur is " present with a grade of 1/6.      No friction rub. No gallop.   Pulmonary:      Effort: Pulmonary effort is normal. No respiratory distress.      Breath sounds: Normal breath sounds. No stridor. No wheezing, rhonchi or rales.   Chest:      Chest wall: No tenderness.   Abdominal:      General: There is no distension.      Palpations: Abdomen is soft.   Musculoskeletal:         General: No swelling or tenderness. Normal range of motion.      Cervical back: Normal range of motion and neck supple. No edema or rigidity.      Right lower leg: No edema.      Left lower leg: No edema.   Feet:      Right foot:      Skin integrity: No ulcer.      Left foot:      Skin integrity: No ulcer.   Skin:     General: Skin is warm and dry.      Coloration: Skin is not jaundiced.   Neurological:      General: No focal deficit present.      Mental Status: He is alert and oriented to person, place, and time.      Cranial Nerves: No cranial nerve deficit.   Psychiatric:         Mood and Affect: Mood normal.         Speech: Speech normal.         Behavior: Behavior normal. Behavior is cooperative.         DATA:   EKG: (personally reviewed tracing(s))  6/14/23 SR 63, PAC, PRWP, similar to 3/14/23    Laboratory:  CBC:  Recent Labs   Lab 04/25/23  1518 07/26/23  0809 09/29/23  1325   WBC 7.51 9.18 8.43   Hemoglobin 13.5 L 13.3 L 13.2 L   Hematocrit 42.9 41.9 39.3 L   Platelets 305 311 338         CHEMISTRIES:  Recent Labs   Lab 01/04/21  1648 07/24/21  0826 07/29/22  0821 03/14/23  0910 07/26/23  0809 09/29/23  1325   Glucose 131 H 92 92 96 88 143 H   Sodium 141 140 140 141 141 139   Potassium 3.6 4.0 4.1 3.4 L 4.2 3.7   BUN 16 16 16 20 21 17   Creatinine 1.0 1.1 1.0 1.1 1.1 0.9   eGFR if non African American >60.0 >60.0 >60.0  --   --   --    eGFR  --   --   --  >60 >60.0 >60   Calcium 9.2 9.7 9.1 8.8 9.1 8.9         CARDIAC BIOMARKERS:        COAGS:  Recent Labs   Lab 04/25/23  1518 08/10/23  1550   INR 1.0 1.0         LIPIDS/LFTS:  Recent  Labs   Lab 07/24/21  0826 07/29/22  0821 03/14/23  0910 07/26/23  0809   Cholesterol 172 171  --  109 L   Triglycerides 68 97  --  52   HDL 59 54  --  50   LDL Cholesterol 99.4 97.6  --  48.6 L   Non-HDL Cholesterol 113 117  --  59   AST 17 17 14 18   ALT 25 25 17 29         Cardiovascular Testing:  Carotid US 12/6/23    There is 0-19% right Internal Carotid Stenosis.    There is 0-19% left Internal Carotid Stenosis.    Compared with prior report dated 5/18/23, L ICA atherosclerosis no longer noted.    TRISH 8/14/23  Normal biventricular size/fxn  LVEF 60%  Normal wall motion  Normal LA/RA  No LA/JOVITA thrombus  Normal valves/doppler exam (trace MR/TR/AI)  Negative saline contrast study, no evidence of PFO/ASD.  Plan:  Cont med rx  Consider outpatient EVM/ILR    Echo 7/20/23  The left ventricle is normal in size with normal systolic function.  The estimated ejection fraction is 55%.  Normal right ventricular size with normal right ventricular systolic function.  Mild mitral regurgitation.  The estimated PA systolic pressure is 34 mmHg.  Saline contrast study notable for bubbles in left heart chambers after 10 cardiac cycles suggesting transpulmonary shunt.  No intracardiac source of embolus noted on this exam. If high clinical suspicion, consider TRISH +/- bubble study.    Aortic US 7/20/23  Suprarenal aortic ectasia without evidence of AAA, maximum diameter 2.7 cm.    Aortic atherosclerosis.    Holter 7/20/23  NSR. Heart rates varied between 39 and 92 BPM with an average of 55 BPM.  There were rare PVCs totalling 404 and averaging 8.42 per hour. There were 2 couplets  There were very frequent PACs totalling 6207 and averaging 129.31 per hour    Ex stress echo 8/20/18  The patient exercised for 10.5 minutes, corresponding to a functional capacity of 12 estimated METS, achieving a peak heart rate of 142 bpm, which is 97% of the age predicted maximum heart rate.   There were no significant electrocardiographic changes  throughout the protocol suggesting ischemia.   EKG Conclusions:   1. The EKG portion of this study is negative for ischemia at a high workload, and peak heart rate of 142 bpm (97% of predicted).   2. Blood pressure response to exercise was normal (Presenting BP: 139/65 Peak BP: 187/82).   3. No significant arrhythmias were present.   4. There were no symptoms of chest discomfort or significant dyspnea throughout the protocol.   Post Exercise Imaging:   Immediate post exercise images demonstrate left ventricular function augmenting.   No exercise induced wall motion abnormalities were identified.   CONCLUSIONS     1 - Normal left ventricular systolic function (EF 55-60%).     2 - Concentric remodeling.     3 - Mild left atrial enlargement.   No evidence of stress induced myocardial ischemia.       ASSESSMENT:   # ?amaurosis R eye, seems atypical.  Neg optho exam.  Sxs abated.  TRISH 8/2023 neg.  S/p ILR 10/4/23.  No AF detected.  # HTN, controlled  # HLP on atorva 40mg  # ?L carotid atherosclerosis (CUS 5/2023 with ?tortuosity), L carotid bruit.  No significant stenosis on CUS 12/2023.  # aortic atherosclerosis (aortic US 7/20/23)    PLAN:   Cont med rx  Cont ASA 81mg qd  RTC 1 year (Dec 2024), sooner prn.  Continue [email or web address] monitoring.      Luis Antonio Small MD, FACC

## 2023-12-28 ENCOUNTER — CLINICAL SUPPORT (OUTPATIENT)
Dept: REHABILITATION | Facility: HOSPITAL | Age: 78
End: 2023-12-28
Attending: ORTHOPAEDIC SURGERY
Payer: MEDICARE

## 2023-12-28 DIAGNOSIS — M25.642 STIFFNESS OF FINGER JOINT OF LEFT HAND: ICD-10-CM

## 2023-12-28 DIAGNOSIS — R29.898 DECREASED GRIP STRENGTH OF LEFT HAND: ICD-10-CM

## 2023-12-28 DIAGNOSIS — Z78.9 ALTERATION IN INSTRUMENTAL ACTIVITIES OF DAILY LIVING (IADL): ICD-10-CM

## 2023-12-28 DIAGNOSIS — M25.522 LEFT ELBOW PAIN: Primary | ICD-10-CM

## 2023-12-28 PROCEDURE — 97530 THERAPEUTIC ACTIVITIES: CPT | Mod: PN

## 2023-12-28 PROCEDURE — 97166 OT EVAL MOD COMPLEX 45 MIN: CPT | Mod: PN

## 2023-12-28 NOTE — PLAN OF CARE
"  [facility]  Occupational Therapy Initial Evaluation     Name: Johnathan Waters  Clinic Number: [id number]    Therapy Diagnosis:   Encounter Diagnoses   Name Primary?    Left elbow pain Yes    Stiffness of finger joint of left hand      Physician: Gaby Martinez MD    Physician Orders: Eval and Treat  Medical Diagnosis: M25.642 (ICD-10-CM) - Stiffness of finger joint of left hand  M25.522 (ICD-10-CM) - Left elbow pain  Date/Mechanism of Injury: Insidious onset of symptoms since July 2023 for L index finger and October 2023 for L elbow/forearm  Evaluation Date: 12/28/2023  Insurance Authorization Period Expiration: TBD  Plan of Care Certification Period: 12/28/23-2/28/24  Date of Return to MD: 2/14/23  Visit # / Visits authorized: 1/1   FOTO: Initial evaluation/6.7%    Precautions:  Standard    Time In:10:00  Time Out: 10:45  Total Appointment Time (timed & untimed codes): 45 minutes    Subjective     Date of Onset: Approx. July 2023 for L index finger injury per fall; Early October 2023 for L elbow/proximal forearm pain     History of Current Condition/Mechanism of Injury: Johnathan reports: "I don't know what happened with my elbow pain. I hurt my index finger back in July. Overall, my elbow is better than back in October. I don't feel very limited my either things- just hurts when I do certain things on a daily basis.     Falls: Yes; 1 within the past 6 months     Involved Side: left  Dominant Side: Right    Mechanism of Injury: Insidious onset   Imaging: See Epic imaging report for details    Prior Therapy: Not for current condition     Pain:  Functional Pain Scale Rating 0-10:   1/10-3/10 on average for L elbow/forearm; 4/10-6/10 with forced PIP flexion of the index joint   Location: L index PIP joint and L proximal forearm/lateral aspect   Description: Variable  Aggravating Factors: opening jars, putting up dishes overhead  Easing Factors: avoiding certain tasks    Occupation:  " retired   Working presently: N/A  Duties: N/A    Functional Limitations/Social History:    Previous functional status includes: Independent with all ADLs.    Current Functional Status   Home/Living environment: lives with their family    - 1 story home, 0 steps to enter      Limitation of Functional Status as follows:   ADLs/IADLs:     - Feeding: (I)    - Bathing: (I)    - Dressing/Grooming: (I)    - Home Management: Mod I    - Driving: (I)     Leisure: cycling,     Patient's Goals for Therapy: Decrease elbow and PIP joint pain    Past Medical History/Physical Systems Review:   Johnathan Waters  has a past medical history of Elevated cholesterol, Hypertension, Squamous cell carcinoma, and Trigger ring finger of right hand.    Johnathan Waters  has a past surgical history that includes Vasectomy; Eye surgery; Colonoscopy (N/A, 9/22/2020); Trigger finger release (Left, 3/21/2023); echocardiogram,transesophageal (N/A, 8/14/2023); and Insertion of implantable loop recorder (N/A, 10/4/2023).    Johnathan Manning has a current medication list which includes the following prescription(s): aspirin, atorvastatin, cetirizine, clindamycin, fluticasone propionate, lisinopril-hydrochlorothiazide, meloxicam, mometasone 0.1%, and tamsulosin.    Review of patient's allergies indicates:   Allergen Reactions    Wasp venom Itching and Swelling    Sulfa (sulfonamide antibiotics) Other (See Comments)     Patient unsure, toddler        Objective     Observation/Appearance:  Skin intact and dry, edema present through L PIP joint     Edema. Measured in centimeters.   12/28/2023 12/28/2023    Right  Left    2in. Below elbow 25.5 25     Edema. Measured in centimeters.   12/28/2023 12/28/2023    Right  Left    Index:       P1 6.8 6.5    PIP 6.9 6.7   P2 6.5 5.5     Hand/Wrist/Elbow ROM. Measured in degrees.   12/28/2023 12/28/2023    Right  Left         Index: MP  0/60 0/80              PIP     0/95 0/95              DIP 0/70 0/70               BERNSTEIN 225 245     Wrist Flex/Ext: 85/60                      R): 85/60  Elbow flexion/extension: 135/-10    R): 135/-10    Note: Mild increase in pain reported through L lateral elbow resisted wrist extension and palpation of lateal epicondyle      Sensation  N/A Distribution 12/28/2023 12/28/2023    Right  Left    [place]     Normal 1.65-2.83     Diminished Light Touch 3.22-3.61     Diminished Protective 3.84-4.31     Loss of Protective 4.56-6.65     Untestable >6.65       Special Tests:   Left   12/28/2023   Cozen's Test  Mildly positive         Strength (Dyanmometer) and Pinch Strength (Pinch Gauge)  Measured in pounds and psi. Average of three trials.   12/28/2023 12/28/2023    Right  Left    Rung II Neutral:  78  Pronated: 78.7 Neutral:  *49.8  Pronated:  *54.6   3pt Pinch 16 *14   2pt Pinch 15 **10     *Note: Mild increase in lateral elbow pain with  testing; **moderate pain reported through L index PIP joint with 2 pt pinch testing     Manual Muscle Testing    Median Innervated:  Muscle Action 12/28/2023   Pronator Teres C6-7 Pronation 5/5   Flexor Carpi Radialis C6-7 Wrist flex/rd 5/5     Ulnar Innervated:  Muscle Action 12/28/2023   Flexor Carpi Ulnaris C7-T1 Wrist flex/ud 5/5     Radial Nerve Innervated:  Muscle Action 12/28/2023   Triceps C7-8 Elbow extension 5/5   Brachioradialis C5-6(mid -position) Elbow Flexion 5/5   Extensor Carpi Radialis Longus C6-7 Wrist ext/rd 4/5   Posterior Interosseous Branch     Extensor Carpi Radialis Brevis C6-7 Wrist extension 4/5   Supinator C6 Supination 5/5       CMS Impairment/Limitation/Restriction for FOTO Elbow Survey    Therapist reviewed FOTO scores for Johnathan Waters on 12/28/2023.   FOTO documents entered into EPIC - see Media section.    Limitation Score: 6.7%  Category: ADLs/IADLs         Treatment     Total Treatment time separate from Evaluation time:15 minutes     Johnathan received the treatments listed below:      Therapeutic  activities to improve functional performance for 10 minutes, including:  -Provision and education of [facility] protocol for conservative management of lateral elbow tendinitis       Patient Education and Home Exercises      Education provided:   -Role of OT, goals for OT, scheduling/cancellations, insurance limitations with patient.  -Additional Education provided: See above     Written Home Exercises Provided: yes.  Exercises were reviewed and Johnathan was able to demonstrate them prior to the end of the session.    Johnathan demonstrated good understanding of the education provided.     Pt was advised to perform these exercises free of pain, and to stop performing them if pain occurs.    See EMR under Patient Instructions for exercises provided 12/28/2023.    Assessment     Johnathan Waters is a 78 y.o. male referred to outpatient occupational therapy and presents with a medical diagnosis of M25.642 (ICD-10-CM) - Stiffness of finger joint of left hand & M25.522 (ICD-10-CM) - Left elbow pain.    Following medical record review it is determined that pt will benefit from occupational therapy services in order to maximize pain free and/or functional use of his LUE. The following goals were discussed with the patient and patient is in agreement with them as to be addressed in the treatment plan. The patient's rehab potential is Good.     Anticipated barriers to Occupational Therapy: None noted     Plan of care discussed with patient: Yes  Patient's spiritual, cultural and educational needs considered and patient is agreeable to the plan of care and goals as stated below:     Medical Necessity is demonstrated by the following  Occupational Profile/History  Co-morbidities and personal factors that may impact the plan of care [x] LOW: Brief chart review  [] MODERATE: Expanded chart review   [] HIGH: Extensive chart review    Moderate / High Support Documentation: N/A     Examination  Performance deficits relating  to physical, cognitive or psychosocial skills that result in activity limitations and/or participation restrictions  [] LOW: addressing 1-3 Performance deficits  [x] MODERATE: 3-5 Performance deficits  [] HIGH: 5+ Performance deficits (please support below)    Moderate / High Support Documentation:    Physical:   Strength  Pinch Strength  Pain    Cognitive:  No Deficits    Psychosocial:    No Deficits     Treatment Options [x] LOW: Limited options  [] MODERATE: Several options  [] HIGH: Multiple options      Decision Making/ Complexity Score: low       The following goals were discussed with the patient and patient is in agreement with them as to be addressed in the treatment plan.       ShortTerm Goals (to be met in 3 weeks):   1. Patient to be IND and compliant with HEP & attendance for duration of therapy.  2. Patient will report no more than 3/10 pain in L elbow and index finger       Long Term Goals (to be met in 6 weeks or by DC):   1. Patient to be IND and compliant with HEP & attendance for duration of therapy.  2. Patient will demonstrate increased L hand  strength by 5-10 lbs. to restore functional grasp for ADLs/work/leisure activities.   3. Patient will demonstrate increased L 2 pt pinch by 2-3 psi's to restore IND with fine motor activities.  4. Patient will report no more than 1/10 pain in L index finger.     Plan   Certification Period/Plan of care expiration: 12/28/2023 to 2/28/24.    Outpatient Occupational Therapy 1 time weekly for 6 weeks to include the following interventions: Paraffin, Fluidotherapy, Manual therapy/joint mobilizations, Modalities for pain management, US 3 mhz, Therapeutic exercises/activities., Strengthening, Orthotic Fabrication/Fit/Training, Joint Protection, and Energy Conservation.        Thank you for allowing me to assist in the care of your Patient. If you have any questions or concerns, please don't hesitate to e-mail or contact me directly.      Lo Staley  OTR/L  [facility]   Phone: [phone]  Fax: [phone]

## 2023-12-28 NOTE — PATIENT INSTRUCTIONS
Patient provided Phase I and II handouts for [facility] protocol for conservative management of lateral elbow tendinitis: moist heat application 2 x daily, 10 min each, followed by gentle massage of affected area x 5 min 2 x daily, follow with AROM for wrist flexion with elbow flexed then extended, forearm neutral, then pronated, 3-5 x daily, 15 reps each position (4 total)

## 2024-01-02 NOTE — PROGRESS NOTES
"[facility]  Occupational Therapy Treatment Note     Date: 1/3/2024  Name: Johnathan Manning [place]  Clinic Number: [id number]    Therapy Diagnosis:   Encounter Diagnoses   Name Primary?    Stiffness of finger joint of left hand Yes    Left elbow pain     Alteration in instrumental activities of daily living (IADL)      Physician: Gaby Martinez MD    Physician Orders: Eval and treat   Medical Diagnosis: M25.642 (ICD-10-CM) - Stiffness of finger joint of left hand  M25.522 (ICD-10-CM) - Left elbow pain  Date of Injury/Onset:  Insidious onset of symptoms since July 2023 for L index finger and October 2023 for L elbow/forearm   Evaluation Date: 12/28/23  Insurance Authorization Period Expiration: TBD  Plan of Care Expiration: 2/28/24; 6 visits on POC  Progress Note Due: 1/28/24   Date of Return to MD: 2/14/24  Visit # / Visits authorized: 1/TBD   FOTO: Initial evaluation/6.7    Precautions:  standard    Time In: 13:04  Time Out: 13:50  Total Billable Time: 46 minutes    Subjective     Pt reports: "I think my elbow might be a little better. My index finger is about the same- just tight and sore when I try to really push it down into a full fist."    He was compliant with home exercise program given last session.     Response to previous treatment:favorable    Functional change: N/A    Pain: 4/10  Location: left index finger: pain with passive flexion (PIP joints and lateral aspects of P1)       Objective     Objective Measures updated at progress report unless specified.    Treatment     Johnathan received the treatments listed below:      Manual therapy techniques: Joint mobilizations, Manual traction, and Soft tissue Mobilization were applied to the: L index finger for 16 minutes, including:  -Aggressive PROM for digit flexion     Therapeutic activities to improve functional performance for 15 minutes, including:  -Review of home program     Direct contact modalities after being cleared for " contraindications:   -Utrasound for scar tissue remodeling, increased circulation, & tissue elasticity @ 100% duty cycle, 3.3 Mhz, applied to L volar index finger, intensity = 0.6 w/cm2 for 8 minutes. Patient tolerated treatment well without adverse effects. Therapist was in attendance throughout intervention.    Supervised modalities after being cleared for contradictions:   -Moist heat application to L hand and elbow/proximal forearm for 7 minutes to facilitate tissue extensibility & ROM prior to therex    Patient Education and Home Exercises     Education provided:   - Importance of compliance w/ HEP to maximize gains   - Progress towards goals   Written Home Exercises Provided: Continue w/ previous HEP   Exercises were reviewed and Johnathan was able to demonstrate them prior to the end of the session.  Johnathan demonstrated good  understanding of the HEP provided. See EMR under Patient Instructions for exercises provided during previous therapy session.       Assessment     Pt would continue to benefit from skilled OT. Pt tolerated session well, noting mild reduction in elbow pain reported since initial evaluation.     Johnathan is progressing well towards his goals and there are no updates to goals at this time. Pt prognosis is Good.     Pt will continue to benefit from skilled Outpatient Occupational Therapy to address the deficits listed in the problem list on initial evaluation provide Pt/family education and to maximize Pt's level of independence in the home and community environment.     Pt's spiritual, cultural and educational needs considered and pt agreeable to plan of care and goals.    Anticipated barriers to occupational therapy: None noted     ShortTerm Goals (to be met in 3 weeks):   1. Patient to be IND and compliant with HEP & attendance for duration of therapy.  2. Patient will report no more than 3/10 pain in L elbow and index finger         Long Term Goals (to be met in 6 weeks or by DC):   1.  Patient to be IND and compliant with HEP & attendance for duration of therapy.  2. Patient will demonstrate increased L hand  strength by 5-10 lbs. to restore functional grasp for ADLs/work/leisure activities.   3. Patient will demonstrate increased L 2 pt pinch by 2-3 psi's to restore IND with fine motor activities.  4. Patient will report no more than 1/10 pain in L index finger.     Plan     Patient is to be treated by Occupational Therapy 1 time per week for 6 weeks, or 6 visits, during the certification period from 12/28/23 to 2/28/24 to achieve the established goals.       Updates/Grading for next session: TBD pending progress       ELIAN Meyers

## 2024-01-03 ENCOUNTER — CLINICAL SUPPORT (OUTPATIENT)
Dept: REHABILITATION | Facility: HOSPITAL | Age: 79
End: 2024-01-03
Payer: MEDICARE

## 2024-01-03 DIAGNOSIS — M25.522 LEFT ELBOW PAIN: ICD-10-CM

## 2024-01-03 DIAGNOSIS — Z78.9 ALTERATION IN INSTRUMENTAL ACTIVITIES OF DAILY LIVING (IADL): ICD-10-CM

## 2024-01-03 DIAGNOSIS — M25.642 STIFFNESS OF FINGER JOINT OF LEFT HAND: Primary | ICD-10-CM

## 2024-01-03 PROCEDURE — 97010 HOT OR COLD PACKS THERAPY: CPT | Mod: PN

## 2024-01-03 PROCEDURE — 97140 MANUAL THERAPY 1/> REGIONS: CPT | Mod: PN

## 2024-01-03 PROCEDURE — 97530 THERAPEUTIC ACTIVITIES: CPT | Mod: PN

## 2024-01-03 PROCEDURE — 97035 APP MDLTY 1+ULTRASOUND EA 15: CPT | Mod: PN

## 2024-01-09 DIAGNOSIS — E78.2 MIXED HYPERLIPIDEMIA: Chronic | ICD-10-CM

## 2024-01-09 RX ORDER — ATORVASTATIN CALCIUM 40 MG/1
40 TABLET, FILM COATED ORAL DAILY
Qty: 90 TABLET | Refills: 1 | Status: SHIPPED | OUTPATIENT
Start: 2024-01-09

## 2024-01-09 NOTE — TELEPHONE ENCOUNTER
No care due was identified.  [facility] Embedded Care Due Messages. Reference number: [id number].   1/09/2024 9:22:04 AM CST

## 2024-01-10 NOTE — TELEPHONE ENCOUNTER
Refill Decision Note   Johnathan Waters  is requesting a refill authorization.  Brief Assessment and Rationale for Refill:  Approve     Medication Therapy Plan:         Comments:     Note composed:10:02 PM 01/09/2024

## 2024-01-11 NOTE — PROGRESS NOTES
"[facility]  Occupational Therapy Treatment Note     Date: 1/12/2024  Name: Johnathan Manning [place]  Clinic Number: [id number]    Therapy Diagnosis:   Encounter Diagnoses   Name Primary?    Stiffness of finger joint of left hand Yes    Left elbow pain     Alteration in instrumental activities of daily living (IADL)      Physician: Gaby Martinez MD    Physician Orders: Eval and treat   Medical Diagnosis: M25.642 (ICD-10-CM) - Stiffness of finger joint of left hand  M25.522 (ICD-10-CM) - Left elbow pain  Date of Injury/Onset:  Insidious onset of symptoms since July 2023 for L index finger and October 2023 for L elbow/forearm   Evaluation Date: 12/28/23  Insurance Authorization Period Expiration: 12/31/24  Plan of Care Expiration: 2/28/24; 6 visits on POC  Progress Note Due: 1/28/24   Date of Return to MD: 2/14/24  Visit # / Visits authorized: 2/20  FOTO: Initial evaluation/6.7    Precautions:  standard    Time In: 10:05  Time Out: 10:51  Total Billable Time: 46 minutes    Subjective     Pt reports: "My elbow is a little better- my finger is about the same."     He was compliant with home exercise program given last session.     Response to previous treatment:favorable    Functional change: N/A    Pain: 1/10 lateral elbow; 3/10 pain with passive composite flexion to L index finger (PIP & DIP joints)  Location: Left lateral epicondyle/left index finger    Objective     Objective Measures updated at progress report unless specified.    Treatment     Johnathan received the treatments listed below:      Therapeutic exercises to develop strength and ROM for 8 minutes including:  -Fluidotherapy to L hand for 8 minutes to increase blood flow, circulation, desensitization, sensory re-education and for pain management. Performed the following therex within fluidotherapy: gentle composite  strengthening and tendon glides with red foam cube     Therapeutic activities to improve functional performance " for 23 minutes, including:  -Review of home program (Phase I, II, and III [facility] for lateral elbow tendinitis)    Direct contact modalities after being cleared for contraindications:   -Utrasound for scar tissue remodeling, increased circulation, & tissue elasticity @ 100% duty cycle, 3.3 Mhz, applied to L volar index finger, intensity = 0.6 w/cm2 for 8 minutes. Patient tolerated treatment well without adverse effects. Therapist was in attendance throughout intervention.    Supervised modalities after being cleared for contradictions:   -Moist heat application to L hand and elbow/proximal forearm for 7 minutes to facilitate tissue extensibility & ROM prior to therex    Patient Education and Home Exercises     Education provided:   - Importance of compliance w/ HEP to maximize gains   - Progress towards goals     Written Home Exercises Provided: Continue w/ previous HEP. Discontinue Coban wrap of index finger.   Exercises were reviewed and Johnathan was able to demonstrate them prior to the end of the session.  Johnathan demonstrated good  understanding of the HEP provided. See EMR under Patient Instructions for exercises provided during previous therapy session.       Assessment     Pt would continue to benefit from skilled OT. Pt tolerated session well, noting continued mild reduction in elbow pain reported since initial evaluation. No changes in L index finger PIP joint stiffness noted.     Johnathan is progressing well towards his goals and there are no updates to goals at this time. Pt prognosis is Good.     Pt will continue to benefit from skilled Outpatient Occupational Therapy to address the deficits listed in the problem list on initial evaluation provide Pt/family education and to maximize Pt's level of independence in the home and community environment.     Pt's spiritual, cultural and educational needs considered and pt agreeable to plan of care and goals.    Anticipated barriers to  occupational therapy: None noted     ShortTerm Goals (to be met in 3 weeks or by 1/18/24):   1. Patient to be IND and compliant with HEP & attendance for duration of therapy.  2. Patient will report no more than 3/10 pain in L elbow and index finger         Long Term Goals (to be met in 6 weeks or by DC):   1. Patient to be IND and compliant with HEP & attendance for duration of therapy.  2. Patient will demonstrate increased L hand  strength by 5-10 lbs. to restore functional grasp for ADLs/work/leisure activities.   3. Patient will demonstrate increased L 2 pt pinch by 2-3 psi's to restore IND with fine motor activities.  4. Patient will report no more than 1/10 pain in L index finger.     Plan     Patient is to be treated by Occupational Therapy 1 time per week for 6 weeks, or 6 visits, during the certification period from 12/28/23 to 2/28/24 to achieve the established goals.       Updates/Grading for next session: TBD pending progress/Re-Assessment       ELIAN Meyers

## 2024-01-12 ENCOUNTER — CLINICAL SUPPORT (OUTPATIENT)
Dept: REHABILITATION | Facility: HOSPITAL | Age: 79
End: 2024-01-12
Payer: MEDICARE

## 2024-01-12 DIAGNOSIS — M25.522 LEFT ELBOW PAIN: ICD-10-CM

## 2024-01-12 DIAGNOSIS — Z78.9 ALTERATION IN INSTRUMENTAL ACTIVITIES OF DAILY LIVING (IADL): ICD-10-CM

## 2024-01-12 DIAGNOSIS — M25.642 STIFFNESS OF FINGER JOINT OF LEFT HAND: Primary | ICD-10-CM

## 2024-01-12 PROCEDURE — 97010 HOT OR COLD PACKS THERAPY: CPT | Mod: PN

## 2024-01-12 PROCEDURE — 97530 THERAPEUTIC ACTIVITIES: CPT | Mod: PN

## 2024-01-12 PROCEDURE — 97022 WHIRLPOOL THERAPY: CPT | Mod: PN

## 2024-01-12 PROCEDURE — 97035 APP MDLTY 1+ULTRASOUND EA 15: CPT | Mod: PN

## 2024-01-12 PROCEDURE — 97110 THERAPEUTIC EXERCISES: CPT | Mod: PN

## 2024-01-16 NOTE — PROGRESS NOTES
"[facility]  Occupational Therapy Progress & Treatment Note     Date: 1/17/2024  Name: Johnathan Waters  Clinic Number: [id number]    Therapy Diagnosis:   Encounter Diagnoses   Name Primary?    Stiffness of finger joint of left hand Yes    Alteration in instrumental activities of daily living (IADL)      Physician: Gaby Martinez MD    Physician Orders: Eval and treat   Medical Diagnosis: M25.642 (ICD-10-CM) - Stiffness of finger joint of left hand  M25.522 (ICD-10-CM) - Left elbow pain  Date of Injury/Onset:  Insidious onset of symptoms since July 2023 for L index finger and October 2023 for L elbow/forearm   Evaluation Date: 12/28/23  Insurance Authorization Period Expiration: 12/31/24  Plan of Care Expiration: 2/28/24; 6 visits on POC  Progress Note Due: 1/28/24   Date of Return to MD: 2/14/24  Visit # / Visits authorized: 3/20  FOTO: Initial evaluation/6.7; 4th visit/3.3      Precautions:  standard    Time In:10:00  Time Out: 10:45  Total Billable Time: 45 minutes    Subjective     Pt reports: "My elbow is feeling better- it just hurts a little. My finger is about the same- it's stiff and hurts if I try to push it too hard."    He was compliant with home exercise program given last session.     Response to previous treatment:favorable    Functional change: Decreased elbow/forearm pain     Pain: 1/10 lateral elbow; 3/10 pain with passive composite flexion to L index finger (PIP & DIP joints)  Location: Left lateral epicondyle/left index finger    Objective     Objective Measures updated at progress report unless specified.     Strength (Dyanmometer) and Pinch Strength (Pinch Gauge)  Measured in pounds and psi. Average of three trials.    12/28/2023 12/28/2023 1/17/2024     Right  Left  Left    Rung II Neutral:  78  Pronated: 78.7 Neutral:  *49.8  Pronated:  *54.6 Neutral:   *58.9  Pronated:   *61.7   3pt Pinch 16 *14 14   2pt Pinch 15 **10 *13      *Note: Mild increase in " lateral elbow pain with  testing; *mild pain reported through L index PIP joint with 2 pt pinch testing     CMS Impairment/Limitation/Restriction for FOTO Elbow Survey     Therapist reviewed FOTO scores for Johnathan Waters on 1/17/2024.   FOTO documents entered into EPIC - see Media section.     Limitation Score: 3.3% (3% improvement)  Category: ADLs/IADLs           Treatment     Johnathan received the treatments listed below:      Re-Assessment performed with measurements and report taken, goals updated x 17 minutes      Therapeutic exercises to develop strength and ROM for 12 minutes including:  -Phase II and Phase III IHR exercises: AROM and PROM for wrist flexion (elbow flexed, then extended), 10-15 reps, extended hold of 15 sec for PROM    Direct contact modalities after being cleared for contraindications:   -Utrasound for scar tissue remodeling, increased circulation, & tissue elasticity @ 100% duty cycle, 1 Mhz, applied to L lateral elbow/proximal forearm, intensity = 0.6 w/cm2 for 8 minutes. Patient tolerated treatment well without adverse effects. Therapist was in attendance throughout intervention.    Supervised modalities after being cleared for contradictions:   -Moist heat application to L hand and elbow/proximal forearm for 8 minutes to facilitate tissue extensibility & ROM prior to therex    Patient Education and Home Exercises     Education provided:   - Importance of compliance w/ HEP to maximize gains   - Progress towards goals     Written Home Exercises Provided: Yes. Phase III IHR exercises (PROM).   Exercises were reviewed and Johnathan was able to demonstrate them prior to the end of the session.  Johnathan demonstrated good  understanding of the HEP provided. See EMR under Patient Instructions for exercises provided during previous and today's therapy sessions.      Assessment     Pt would continue to benefit from skilled OT. Pt has met all STG established for therapy, noting decreased  lateral elbow pain reported since initial evaluation, as well as increased  strength demonstrated. Pt does not report improvement in his index finger PIP stiffness, but acknowledges that this injury was not addressed until recently. Pt does not report functional limitations due to the aforementioned.     Johnathan is progressing well towards his goals and there are no updates to goals at this time. Pt prognosis is Good.     Pt will continue to benefit from skilled Outpatient Occupational Therapy to address the deficits listed in the problem list on initial evaluation provide Pt/family education and to maximize Pt's level of independence in the home and community environment.     Pt's spiritual, cultural and educational needs considered and pt agreeable to plan of care and goals.    Anticipated barriers to occupational therapy: None noted     ShortTerm Goals (to be met in 3 weeks or by 1/18/24):   1. Patient to be IND and compliant with HEP & attendance for duration of therapy. Goal Met 1/17   2. Patient will report no more than 3/10 pain in L elbow and index finger. Goal Met 1/17        Long Term Goals (to be met in 6 weeks or by DC):   1. Patient to be IND and compliant with HEP & attendance for duration of therapy.  2. Patient will demonstrate increased L hand  strength by 5-10 lbs. to restore functional grasp for ADLs/work/leisure activities. Goal Met 1/17  3. Patient will demonstrate increased L 2 pt pinch by 2-3 psi's to restore IND with fine motor activities. Goal Met 1/17  4. Patient will report no more than 1/10 pain in L index finger.     Plan     Patient is to be treated by Occupational Therapy 1 time per week for 6 weeks, or 6 visits, during the certification period from 12/28/23 to 2/28/24 to achieve the established goals.       Updates/Grading for next session: TBD pending progress      ELIAN Meyers

## 2024-01-17 ENCOUNTER — CLINICAL SUPPORT (OUTPATIENT)
Dept: REHABILITATION | Facility: HOSPITAL | Age: 79
End: 2024-01-17
Payer: MEDICARE

## 2024-01-17 DIAGNOSIS — Z78.9 ALTERATION IN INSTRUMENTAL ACTIVITIES OF DAILY LIVING (IADL): ICD-10-CM

## 2024-01-17 DIAGNOSIS — R29.898 DECREASED GRIP STRENGTH OF LEFT HAND: ICD-10-CM

## 2024-01-17 DIAGNOSIS — M25.642 STIFFNESS OF FINGER JOINT OF LEFT HAND: Primary | ICD-10-CM

## 2024-01-17 DIAGNOSIS — M25.522 LEFT ELBOW PAIN: ICD-10-CM

## 2024-01-17 PROCEDURE — 97035 APP MDLTY 1+ULTRASOUND EA 15: CPT | Mod: PN

## 2024-01-17 PROCEDURE — 97110 THERAPEUTIC EXERCISES: CPT | Mod: PN

## 2024-01-17 PROCEDURE — 97530 THERAPEUTIC ACTIVITIES: CPT | Mod: PN

## 2024-01-17 PROCEDURE — 97010 HOT OR COLD PACKS THERAPY: CPT | Mod: PN

## 2024-01-21 NOTE — PROGRESS NOTES
"[facility]  Occupational Therapy Treatment Note     Date: 1/22/2024  Name: Johnathan Manning [place]  Clinic Number: [id number]    Therapy Diagnosis:   Encounter Diagnoses   Name Primary?    Stiffness of finger joint of left hand Yes    Left elbow pain     Alteration in instrumental activities of daily living (IADL)      Physician: Gaby Martinez MD    Physician Orders: Eval and treat   Medical Diagnosis: M25.642 (ICD-10-CM) - Stiffness of finger joint of left hand  M25.522 (ICD-10-CM) - Left elbow pain  Date of Injury/Onset:  Insidious onset of symptoms since July 2023 for L index finger and October 2023 for L elbow/forearm   Evaluation Date: 12/28/23  Insurance Authorization Period Expiration: 12/31/24  Plan of Care Expiration: 2/28/24; 6 visits on POC  Progress Note Due: 2/28/24   Date of Return to MD: 2/14/24  Visit # / Visits authorized: 4/20  FOTO: Initial evaluation/6.7; 4th visit/3.3    Precautions:  standard    Time In:10:00  Time Out: 10:46  Total Billable Time: 46 minutes    Subjective     Pt reports: "My elbow is just about better."    He was compliant with home exercise program given last session.     Response to previous treatment:favorable    Functional change: Decreased elbow/forearm pain     Pain: 1/10 lateral epicondyle TTP; 1/10 pain with Cozen's test present only at lateral epicondyle  Location: Left lateral epicondyle    Objective     Objective Measures updated at progress report unless specified.    Treatment     Johnathan received the treatments listed below:      Therapeutic exercises to develop strength and ROM for 23 minutes including:  -ARROM with 2# free weight for wrist flex/ext/UD/RD x 2 min each, 2 sets  -PHG 35# for  strengthening (neutral forearm) x 5 min   -Rest break between sets    Therapeutic activities to improve functional performance for 8 minutes, including:  -Review of home program (Phase II and III [facility] for lateral elbow " tendinitis)     Direct contact modalities after being cleared for contraindications:   -Utrasound for scar tissue remodeling, increased circulation, & tissue elasticity @ 100% duty cycle, 1 Mhz, applied to L lateral elbow/proximal forearm, intensity = 0.6 w/cm2 for 8 minutes. Patient tolerated treatment well without adverse effects. Therapist was in attendance throughout intervention.    Supervised modalities after being cleared for contradictions:   -Moist heat application to L hand and elbow/proximal forearm for 7 minutes to facilitate tissue extensibility & ROM prior to therex    Patient Education and Home Exercises     Education provided:   - Importance of compliance w/ HEP to maximize gains   - Progress towards goals     Written Home Exercises Provided: Yes. 2# free weight for wrist/forearm ARROM every other day- 2 sets, 20-25 reps each.  strengthening with red Theraputty 5-10 minutes, every other day.   Exercises were reviewed and Johnathan was able to demonstrate them prior to the end of the session.  Johnathan demonstrated good  understanding of the HEP provided. See EMR under Patient Instructions for exercises provided during previous and today's therapy sessions.      Assessment     Pt would continue to benefit from skilled OT. Pt tolerated session well, noting decreased lateral elbow pain reported since last treatment session. Upgraded to wrist/forearm &  strengthening this session.     Johnathan is progressing well towards his goals and there are no updates to goals at this time. Pt prognosis is Good.     Pt will continue to benefit from skilled Outpatient Occupational Therapy to address the deficits listed in the problem list on initial evaluation provide Pt/family education and to maximize Pt's level of independence in the home and community environment.     Pt's spiritual, cultural and educational needs considered and pt agreeable to plan of care and goals.    Anticipated barriers to occupational  therapy: None noted     ShortTerm Goals (to be met in 3 weeks or by 1/18/24):   1. Patient to be IND and compliant with HEP & attendance for duration of therapy. Goal Met 1/17   2. Patient will report no more than 3/10 pain in L elbow and index finger. Goal Met 1/17        Long Term Goals (to be met in 6 weeks or by DC):   1. Patient to be IND and compliant with HEP & attendance for duration of therapy.  2. Patient will demonstrate increased L hand  strength by 5-10 lbs. to restore functional grasp for ADLs/work/leisure activities. Goal Met 1/17  3. Patient will demonstrate increased L 2 pt pinch by 2-3 psi's to restore IND with fine motor activities. Goal Met 1/17  4. Patient will report no more than 1/10 pain in L index finger.     Plan     Patient is to be treated by Occupational Therapy 1 time per week for 6 weeks, or 6 visits, during the certification period from 12/28/23 to 2/28/24 to achieve the established goals.       Updates/Grading for next session: TBD pending progress      ELIAN Meyers

## 2024-01-22 ENCOUNTER — CLINICAL SUPPORT (OUTPATIENT)
Dept: REHABILITATION | Facility: HOSPITAL | Age: 79
End: 2024-01-22
Payer: MEDICARE

## 2024-01-22 DIAGNOSIS — M25.642 STIFFNESS OF FINGER JOINT OF LEFT HAND: Primary | ICD-10-CM

## 2024-01-22 DIAGNOSIS — M25.522 LEFT ELBOW PAIN: ICD-10-CM

## 2024-01-22 DIAGNOSIS — Z78.9 ALTERATION IN INSTRUMENTAL ACTIVITIES OF DAILY LIVING (IADL): ICD-10-CM

## 2024-01-22 PROCEDURE — 97035 APP MDLTY 1+ULTRASOUND EA 15: CPT | Mod: PN

## 2024-01-22 PROCEDURE — 97530 THERAPEUTIC ACTIVITIES: CPT | Mod: PN

## 2024-01-22 PROCEDURE — 97110 THERAPEUTIC EXERCISES: CPT | Mod: PN

## 2024-01-22 PROCEDURE — 97010 HOT OR COLD PACKS THERAPY: CPT | Mod: PN

## 2024-01-29 ENCOUNTER — TELEPHONE (OUTPATIENT)
Dept: ORTHOPEDICS | Facility: CLINIC | Age: 79
End: 2024-01-29
Payer: MEDICARE

## 2024-01-29 ENCOUNTER — CLINICAL SUPPORT (OUTPATIENT)
Dept: REHABILITATION | Facility: HOSPITAL | Age: 79
End: 2024-01-29
Payer: MEDICARE

## 2024-01-29 ENCOUNTER — PATIENT MESSAGE (OUTPATIENT)
Dept: ORTHOPEDICS | Facility: CLINIC | Age: 79
End: 2024-01-29
Payer: MEDICARE

## 2024-01-29 DIAGNOSIS — M25.522 LEFT ELBOW PAIN: ICD-10-CM

## 2024-01-29 DIAGNOSIS — Z78.9 ALTERATION IN INSTRUMENTAL ACTIVITIES OF DAILY LIVING (IADL): ICD-10-CM

## 2024-01-29 DIAGNOSIS — M25.642 STIFFNESS OF FINGER JOINT OF LEFT HAND: Primary | ICD-10-CM

## 2024-01-29 PROCEDURE — 97110 THERAPEUTIC EXERCISES: CPT | Mod: PN

## 2024-01-29 PROCEDURE — 97035 APP MDLTY 1+ULTRASOUND EA 15: CPT | Mod: PN

## 2024-01-29 PROCEDURE — 97010 HOT OR COLD PACKS THERAPY: CPT | Mod: PN

## 2024-01-29 PROCEDURE — 97530 THERAPEUTIC ACTIVITIES: CPT | Mod: PN

## 2024-01-29 NOTE — PROGRESS NOTES
"[facility]  Occupational Therapy Treatment Note     Date: 1/29/2024  Name: Johnathan Waters  Clinic Number: [id number]    Therapy Diagnosis:   Encounter Diagnoses   Name Primary?    Stiffness of finger joint of left hand Yes    Left elbow pain     Alteration in instrumental activities of daily living (IADL)      Physician: Gaby Martinez MD    Physician Orders: Eval and treat   Medical Diagnosis: M25.642 (ICD-10-CM) - Stiffness of finger joint of left hand  M25.522 (ICD-10-CM) - Left elbow pain  Date of Injury/Onset:  Insidious onset of symptoms since July 2023 for L index finger and October 2023 for L elbow/forearm   Evaluation Date: 12/28/23  Insurance Authorization Period Expiration: 12/31/24  Plan of Care Expiration: 2/28/24; 6 visits on POC  Progress Note Due: 2/28/24   Date of Return to MD: 2/14/24  Visit # / Visits authorized: 5/20  FOTO: Initial evaluation/6.7; 4th visit/3.3    Precautions:  standard    Time In:10:00  Time Out: 10:46  Total Billable Time: 46 minutes    Subjective     Pt reports: "I've been doing the weighted exercises- I feel it after- but I barely have any pain anymore."    He was compliant with home exercise program given last session.     Response to previous treatment:favorable    Functional change: Decreased elbow/forearm pain     Pain: 0/10 lateral epicondyle TTP; 1/10 pain with Cozen's test present only at lateral epicondyle  Location: Left lateral epicondyle    Objective     Objective Measures updated at progress report unless specified.    Treatment     Johnathan received the treatments listed below:      Therapeutic exercises to develop strength and ROM for 23 minutes including:  -ARROM with 2# free weight for wrist flex/ext/UD/RD x 3 min each, 2 sets  -Forearm pronation/supination x 2 min   -PHG 50# for  strengthening (neutral forearm) x 7 min   -Rest break between sets    Therapeutic activities to improve functional performance for 8 minutes, " including:  -Review of home program (Phase II and III [facility] for lateral elbow tendinitis)     Direct contact modalities after being cleared for contraindications:   -Utrasound for scar tissue remodeling, increased circulation, & tissue elasticity @ 100% duty cycle, 1 Mhz, applied to L lateral elbow/proximal forearm, intensity = 0.6 w/cm2 for 8 minutes. Patient tolerated treatment well without adverse effects. Therapist was in attendance throughout intervention.    Supervised modalities after being cleared for contradictions:   -Moist heat application to L hand and elbow/proximal forearm for 7 minutes to facilitate tissue extensibility & ROM prior to therex    Patient Education and Home Exercises     Education provided:   - Importance of compliance w/ HEP to maximize gains   - Progress towards goals     Written Home Exercises Provided: Continue w/ previous HEP.   Exercises were reviewed and Johnathan was able to demonstrate them prior to the end of the session.  Johnathan demonstrated good  understanding of the HEP provided. See EMR under Patient Instructions for exercises provided during previous therapy sessions.      Assessment     Pt would continue to benefit from skilled OT. Pt tolerated session well, noting no increased lateral forearm/elbow pain reported with increased duration of therapeutic exercises. Pt instructed to wean brace wear to only with exercises and tasks that may require wrist extensor use.     Johnathan is progressing well towards his goals and there are no updates to goals at this time. Pt prognosis is Good.     Pt will continue to benefit from skilled Outpatient Occupational Therapy to address the deficits listed in the problem list on initial evaluation provide Pt/family education and to maximize Pt's level of independence in the home and community environment.     Pt's spiritual, cultural and educational needs considered and pt agreeable to plan of care and  goals.    Anticipated barriers to occupational therapy: None noted     ShortTerm Goals (to be met in 3 weeks or by 1/18/24):   1. Patient to be IND and compliant with HEP & attendance for duration of therapy. Goal Met 1/17   2. Patient will report no more than 3/10 pain in L elbow and index finger. Goal Met 1/17        Long Term Goals (to be met in 6 weeks or by DC):   1. Patient to be IND and compliant with HEP & attendance for duration of therapy.  2. Patient will demonstrate increased L hand  strength by 5-10 lbs. to restore functional grasp for ADLs/work/leisure activities. Goal Met 1/17  3. Patient will demonstrate increased L 2 pt pinch by 2-3 psi's to restore IND with fine motor activities. Goal Met 1/17  4. Patient will report no more than 1/10 pain in L index finger.     Plan     Patient is to be treated by Occupational Therapy 1 time per week for 6 weeks, or 6 visits, during the certification period from 12/28/23 to 2/28/24 to achieve the established goals.       Updates/Grading for next session: Re-Assessment       ELIAN Meyers

## 2024-01-29 NOTE — TELEPHONE ENCOUNTER
Left message for patient about a change to his schedule with  for 02/14/24 and the new appr has been mail out If there are any question please call the clinic

## 2024-02-04 NOTE — PROGRESS NOTES
"[facility]  Occupational Therapy Progress Note/Discharge Summary     Date: 2/5/2024  Name: Johnathan Waters  Clinic Number: [id number]    Therapy Diagnosis:   Encounter Diagnoses   Name Primary?    Stiffness of finger joint of left hand Yes    Alteration in instrumental activities of daily living (IADL)      Physician: Gaby Martinez MD    Physician Orders: Eval and treat   Medical Diagnosis: M25.642 (ICD-10-CM) - Stiffness of finger joint of left hand  M25.522 (ICD-10-CM) - Left elbow pain  Date of Injury/Onset:  Insidious onset of symptoms since July 2023 for L index finger and October 2023 for L elbow/forearm   Evaluation Date: 12/28/23  Insurance Authorization Period Expiration: 12/31/24  Plan of Care Expiration: 2/28/24; 6 visits on POC  Progress Note Due: 2/28/24   Date of Return to MD: 2/14/24  Visit # / Visits authorized: 6/20  FOTO: Initial evaluation/6.7; 4th visit/3.3; 7th visit/0.8    Precautions:  standard    Time In:10:03  Time Out: 10:36  Total Billable Time: 33 minutes    Subjective     Pt reports: "I was a little sore after last time- but recovered in less than a day."    He was compliant with home exercise program given last session.     Response to previous treatment:favorable    Functional change: Decreased elbow/forearm pain     Pain: 0/10 lateral epicondyle TTP; 1/10 pain with Cozen's test present only at lateral epicondyle  Location: Left lateral epicondyle    Objective     Objective Measures updated at progress report unless specified.     Strength (Dyanmometer) and Pinch Strength (Pinch Gauge)  Measured in pounds and psi. Average of three trials.    12/28/2023 12/28/2023 1/17/2024 2/5/2024     Right  Left  Left  Left    Rung II Neutral:  78  Pronated: 78.7 Neutral:  *49.8  Pronated:  *54.6 Neutral:   *58.9  Pronated:   *61.7 Neutral:  58.8  Pronated:  56.2   3pt Pinch 16 *14 14 17   2pt Pinch 15 **10 *13 14      CMS Impairment/Limitation/Restriction for " FOTO Elbow Survey     Therapist reviewed FOTO scores for Johnathan Waters on 2/5/2024.   FOTO documents entered into [email or web address] - see Media section.     Limitation Score: 0.8% (6% improvement)  Category: ADLs/IADLs           Treatment     Johnathan received the treatments listed below:      Re-Assessment performed with measurements and report taken, goals updated x 23 minutes        Therapeutic activities to improve functional performance for 13 minutes, including:  -Education on safely progressing BUE exercises per home program to prevent re-occurrence of lateral elbow tendinitis       Patient Education and Home Exercises     Education provided:   - Importance of compliance w/ HEP to maximize gains   - Progress towards goals     Written Home Exercises Provided: No.   Exercises were reviewed and Johnathan was able to demonstrate them prior to the end of the session.  Johnathan demonstrated good  understanding of the HEP provided. See EMR under Patient Instructions for exercises provided during previous therapy sessions.      Assessment     Pt has met all goals established for therapy, noting normative L elbow/wrist ROM, /pinch strength, and functional use of his LUE reported with ADLs and IADLs. Pt demonstrates no further functional limitations with daily tasks.      Pt's spiritual, cultural and educational needs considered and pt agreeable to plan of care and goals.      ShortTerm Goals (to be met in 3 weeks or by 1/18/24):   1. Patient to be IND and compliant with HEP & attendance for duration of therapy. Goal Met 1/17   2. Patient will report no more than 3/10 pain in L elbow and index finger. Goal Met 1/17        Long Term Goals (to be met in 6 weeks or by DC):   1. Patient to be IND and compliant with HEP & attendance for duration of therapy. Goal Met 2/5  2. Patient will demonstrate increased L hand  strength by 5-10 lbs. to restore functional grasp for ADLs/work/leisure activities. Goal Met 1/17  3. Patient  will demonstrate increased L 2 pt pinch by 2-3 psi's to restore IND with fine motor activities. Goal Met 1/17  4. Patient will report no more than 1/10 pain in L index finger. Goal Met 2/5     Plan     Patient is appropriate for discharge from Outpatient Occupational Therapy at this time and will continue at home with HEP.        ELIAN Meyers

## 2024-02-05 ENCOUNTER — CLINICAL SUPPORT (OUTPATIENT)
Dept: REHABILITATION | Facility: HOSPITAL | Age: 79
End: 2024-02-05
Payer: MEDICARE

## 2024-02-05 DIAGNOSIS — Z78.9 ALTERATION IN INSTRUMENTAL ACTIVITIES OF DAILY LIVING (IADL): ICD-10-CM

## 2024-02-05 DIAGNOSIS — M25.642 STIFFNESS OF FINGER JOINT OF LEFT HAND: Primary | ICD-10-CM

## 2024-02-05 PROCEDURE — 97530 THERAPEUTIC ACTIVITIES: CPT | Mod: PN

## 2024-02-20 ENCOUNTER — OFFICE VISIT (OUTPATIENT)
Dept: ORTHOPEDICS | Facility: CLINIC | Age: 79
End: 2024-02-20
Payer: MEDICARE

## 2024-02-20 DIAGNOSIS — M77.12 LEFT LATERAL EPICONDYLITIS: Primary | ICD-10-CM

## 2024-02-20 PROCEDURE — 99213 OFFICE O/P EST LOW 20 MIN: CPT | Mod: PBBFAC,PN | Performed by: ORTHOPAEDIC SURGERY

## 2024-02-20 PROCEDURE — 99999 PR PBB SHADOW E&M-EST. PATIENT-LVL III: CPT | Mod: PBBFAC,,, | Performed by: ORTHOPAEDIC SURGERY

## 2024-02-20 PROCEDURE — 99213 OFFICE O/P EST LOW 20 MIN: CPT | Mod: S$PBB,,, | Performed by: ORTHOPAEDIC SURGERY

## 2024-02-20 NOTE — PROGRESS NOTES
Assessment: 78 y.o. male with L lateral epicondylitis     I explained my diagnostic impression and the reasoning behind it in detail, using layman's terms.      Plan:   -  Mobic 7.5 mg PRN  - Continue HEP  - Hip stretches for GT bursitis discussed  - Return to clinic PRN    All questions were answered in detail. The patient is in full agreement with the treatment plan and will proceed accordingly.    Chief Complaint   Patient presents with    Left Elbow - Pain       Initial visit (10/23/23): Johnathan Waters is a 78 y.o. male who presents today complaining of Pain of the Left Elbow     Pain to lateral elbow   Intermittent   Also complains of pain and stiffness to left hand - tripped in some grass and landed on outstretched hand. Has stiffness of the LIF PIP  Recently had a cardiac procedure     12/20/23   Here for follow up of hand stiffness and lateral epicondylitis  Somewhat better in elbow but still has pain  No improvement in hand ROM with HEP    2/20/24  Elbow pain better  Hand stiffness improved some    Having some L GT bursitis pain that is mild     This is the extent of the patient's complaints at this time.     Review of patient's allergies indicates:   Allergen Reactions    Wasp venom Itching and Swelling    Sulfa (sulfonamide antibiotics) Other (See Comments)     Patient unsure, toddler         Current Outpatient Medications:     aspirin (ECOTRIN) 81 MG EC tablet, Take 1 tablet (81 mg total) by mouth once daily., Disp: 90 tablet, Rfl: 3    atorvastatin (LIPITOR) 40 MG tablet, Take 1 tablet (40 mg total) by mouth once daily., Disp: 90 tablet, Rfl: 1    cetirizine (ZYRTEC) 10 MG tablet, , Disp: , Rfl:     clindamycin (CLEOCIN T) 1 % external solution, APPLY TOPICALLY TO FACE TWICE DAILY AS NEEDED, Disp: , Rfl:     fluticasone propionate (FLONASE) 50 mcg/actuation nasal spray, SHAKE LIQUID AND USE 1 TO 2 SPRAYS(50  MCG) IN EACH NOSTRIL DAILY AS NEEDED FOR RHINITIS OR CONGESTION, Disp: 54 g, Rfl: 3     lisinopriL-hydrochlorothiazide (PRINZIDE,ZESTORETIC) 10-12.5 mg per tablet, Take 1 tablet by mouth once daily., Disp: 90 tablet, Rfl: 3    meloxicam (MOBIC) 7.5 MG tablet, TAKE 1 TABLET(7.5 MG) BY MOUTH EVERY DAY, Disp: 30 tablet, Rfl: 0    mometasone 0.1% (ELOCON) 0.1 % cream, Apply topically daily as needed (rash)., Disp: 50 g, Rfl: 2    tamsulosin (FLOMAX) 0.4 mg Cap, Take 1 capsule (0.4 mg total) by mouth once daily., Disp: 90 capsule, Rfl: 3    Physical Exam:   Vitals:    02/20/24 1416   PainSc: 0-No pain       General: Patient is alert, awake and oriented to time, place and person. Mood and affect are appropriate.  Patient does not appear to be in any distress, denies any constitutional symptoms and appears stated age.   HEENT:  Pupils are equal and round, sclera are not injected. External examination of ears and nose reveals no abnormalities. Cranial nerves II-X are grossly intact  Skin:  no rashes, abrasions or open wounds on the affected extremity   Resp:  No respiratory distress or audible wheezing   CV: 2+  pulses, all extremities warm and well perfused   Left Upper extremity   NTTP over ECRB  No pain with resisted wrist extension  Neg tinels over PIN   Continued stiffness at the PIP joint of the left index finger       Imaging: no new       This note was created by combination of typed  and M-Modal dictation. Transcription and phonetic errors may be present.  If there are any questions, please contact me.    Past Medical History:   Diagnosis Date    Elevated cholesterol     Hypertension     Squamous cell carcinoma     under right eye.  Sees outside Dermatology    Trigger ring finger of right hand 1/30/2019       Active Problem List with Overview Notes    Diagnosis Date Noted    Aortic atherosclerosis 08/09/2023    Decreased  strength of left hand 04/11/2023    Trigger finger, left ring finger 03/21/2023    Acquired hallux rigidus of left foot 01/09/2020    Trigger ring finger of right hand  01/30/2019    Benign non-nodular prostatic hyperplasia with lower urinary tract symptoms 05/30/2016    Mixed hyperlipidemia 05/11/2015    Essential hypertension 05/10/2013       Past Surgical History:   Procedure Laterality Date    COLONOSCOPY N/A 9/22/2020    Procedure: COLONOSCOPY;  Surgeon: Manuela Ward MD;  Location: [facility] ENDO;  Service: Endoscopy;  Laterality: N/A;    ECHOCARDIOGRAM,TRANSESOPHAGEAL N/A 8/14/2023    Procedure: Transesophageal echo (TRISH) intra-procedure log documentation;  Surgeon: Luis Antonio Small MD;  Location: [facility] CATH LAB;  Service: Cardiology;  Laterality: N/A;  with bubble study  RN PREOP 8/10/2023    EYE SURGERY      INSERTION OF IMPLANTABLE LOOP RECORDER N/A 10/4/2023    Procedure: Insertion, Implantable Loop Recorder;  Surgeon: Alis Bronson MD;  Location: [facility] CATH LAB;  Service: Cardiology;  Laterality: N/A;  RN PREOP 9/29/2023---INCOMPLETE CONSENT---md notified--pt    TRIGGER FINGER RELEASE Left 3/21/2023    Procedure: RELEASE, TRIGGER FINGER;  Surgeon: Gaby Martinez MD;  Location: [facility] OR;  Service: Orthopedics;  Laterality: Left;  RN PREOP 03/14/2023---NEED H/P    VASECTOMY         Family History   Problem Relation Age of Onset    Hypertension Mother     Cancer Father         lung    Diabetes Sister     Asthma Daughter     No Known Problems Daughter

## 2024-04-05 ENCOUNTER — PATIENT MESSAGE (OUTPATIENT)
Dept: ADMINISTRATIVE | Facility: OTHER | Age: 79
End: 2024-04-05
Payer: MEDICARE

## 2024-06-10 ENCOUNTER — PATIENT MESSAGE (OUTPATIENT)
Dept: INTERNAL MEDICINE | Facility: CLINIC | Age: 79
End: 2024-06-10
Payer: MEDICARE

## 2024-07-05 DIAGNOSIS — I10 ESSENTIAL HYPERTENSION: ICD-10-CM

## 2024-07-05 RX ORDER — LISINOPRIL AND HYDROCHLOROTHIAZIDE 10; 12.5 MG/1; MG/1
1 TABLET ORAL
Qty: 90 TABLET | Refills: 0 | Status: SHIPPED | OUTPATIENT
Start: 2024-07-05

## 2024-07-05 NOTE — TELEPHONE ENCOUNTER
Refill Decision Note   Johnathan Waters  is requesting a refill authorization.    Brief Assessment and Rationale for Refill:  Approve       Medication Therapy Plan:  FLOS      Comments:     Note composed:4:15 PM 07/05/2024

## 2024-07-05 NOTE — TELEPHONE ENCOUNTER
Care Due:                  Date            Visit Type   Department     Provider  --------------------------------------------------------------------------------                                [facility]                              FOLLOWUP/OF  MED/ INTERNAL  Last Visit: 09-      FICE VISIT   MED/ PEDS      Bernard Davalos  Next Visit: None Scheduled  None         None Found                                                            Last  Test          Frequency    Reason                     Performed    Due Date  --------------------------------------------------------------------------------    CMP.........  12 months..  atorvastatin.............  07- 07-    Lipid Panel.  12 months..  atorvastatin.............  07- 07-    [employer] Embedded Care Due Messages. Reference number: [id number].   7/05/2024 2:19:54 AM CDT

## 2024-07-29 ENCOUNTER — LAB VISIT (OUTPATIENT)
Dept: LAB | Facility: HOSPITAL | Age: 79
End: 2024-07-29
Attending: INTERNAL MEDICINE
Payer: MEDICARE

## 2024-07-29 DIAGNOSIS — I10 ESSENTIAL HYPERTENSION: Chronic | ICD-10-CM

## 2024-07-29 LAB
ALBUMIN SERPL BCP-MCNC: 3.6 G/DL (ref 3.5–5.2)
ALP SERPL-CCNC: 57 U/L (ref 55–135)
ALT SERPL W/O P-5'-P-CCNC: 30 U/L (ref 10–44)
ANION GAP SERPL CALC-SCNC: 9 MMOL/L (ref 8–16)
AST SERPL-CCNC: 16 U/L (ref 10–40)
BASOPHILS # BLD AUTO: 0.08 K/UL (ref 0–0.2)
BASOPHILS NFR BLD: 0.9 % (ref 0–1.9)
BILIRUB SERPL-MCNC: 0.8 MG/DL (ref 0.1–1)
BUN SERPL-MCNC: 17 MG/DL (ref 8–23)
CALCIUM SERPL-MCNC: 9.3 MG/DL (ref 8.7–10.5)
CHLORIDE SERPL-SCNC: 104 MMOL/L (ref 95–110)
CHOLEST SERPL-MCNC: 113 MG/DL (ref 120–199)
CHOLEST/HDLC SERPL: 2.3 {RATIO} (ref 2–5)
CO2 SERPL-SCNC: 27 MMOL/L (ref 23–29)
CREAT SERPL-MCNC: 1 MG/DL (ref 0.5–1.4)
DIFFERENTIAL METHOD BLD: ABNORMAL
EOSINOPHIL # BLD AUTO: 0.3 K/UL (ref 0–0.5)
EOSINOPHIL NFR BLD: 3.7 % (ref 0–8)
ERYTHROCYTE [DISTWIDTH] IN BLOOD BY AUTOMATED COUNT: 13.5 % (ref 11.5–14.5)
EST. GFR  (NO RACE VARIABLE): >60 ML/MIN/1.73 M^2
GLUCOSE SERPL-MCNC: 97 MG/DL (ref 70–110)
HCT VFR BLD AUTO: 40.6 % (ref 40–54)
HDLC SERPL-MCNC: 50 MG/DL (ref 40–75)
HDLC SERPL: 44.2 % (ref 20–50)
HGB BLD-MCNC: 13.3 G/DL (ref 14–18)
IMM GRANULOCYTES # BLD AUTO: 0.04 K/UL (ref 0–0.04)
IMM GRANULOCYTES NFR BLD AUTO: 0.5 % (ref 0–0.5)
LDLC SERPL CALC-MCNC: 48.2 MG/DL (ref 63–159)
LYMPHOCYTES # BLD AUTO: 1.8 K/UL (ref 1–4.8)
LYMPHOCYTES NFR BLD: 20.9 % (ref 18–48)
MCH RBC QN AUTO: 28.9 PG (ref 27–31)
MCHC RBC AUTO-ENTMCNC: 32.8 G/DL (ref 32–36)
MCV RBC AUTO: 88 FL (ref 82–98)
MONOCYTES # BLD AUTO: 1 K/UL (ref 0.3–1)
MONOCYTES NFR BLD: 11.7 % (ref 4–15)
NEUTROPHILS # BLD AUTO: 5.3 K/UL (ref 1.8–7.7)
NEUTROPHILS NFR BLD: 62.3 % (ref 38–73)
NONHDLC SERPL-MCNC: 63 MG/DL
NRBC BLD-RTO: 0 /100 WBC
PLATELET # BLD AUTO: 289 K/UL (ref 150–450)
PMV BLD AUTO: 12.2 FL (ref 9.2–12.9)
POTASSIUM SERPL-SCNC: 3.8 MMOL/L (ref 3.5–5.1)
PROT SERPL-MCNC: 6.4 G/DL (ref 6–8.4)
RBC # BLD AUTO: 4.61 M/UL (ref 4.6–6.2)
SODIUM SERPL-SCNC: 140 MMOL/L (ref 136–145)
TRIGL SERPL-MCNC: 74 MG/DL (ref 30–150)
WBC # BLD AUTO: 8.55 K/UL (ref 3.9–12.7)

## 2024-07-29 PROCEDURE — 80053 COMPREHEN METABOLIC PANEL: CPT | Performed by: INTERNAL MEDICINE

## 2024-07-29 PROCEDURE — 85025 COMPLETE CBC W/AUTO DIFF WBC: CPT | Performed by: INTERNAL MEDICINE

## 2024-07-29 PROCEDURE — 80061 LIPID PANEL: CPT | Performed by: INTERNAL MEDICINE

## 2024-07-29 PROCEDURE — 36415 COLL VENOUS BLD VENIPUNCTURE: CPT | Mod: PO | Performed by: INTERNAL MEDICINE

## 2024-08-05 ENCOUNTER — IMMUNIZATION (OUTPATIENT)
Dept: INTERNAL MEDICINE | Facility: CLINIC | Age: 79
End: 2024-08-05
Payer: MEDICARE

## 2024-08-05 ENCOUNTER — OFFICE VISIT (OUTPATIENT)
Dept: INTERNAL MEDICINE | Facility: CLINIC | Age: 79
End: 2024-08-05
Payer: MEDICARE

## 2024-08-05 VITALS
WEIGHT: 168.88 LBS | SYSTOLIC BLOOD PRESSURE: 136 MMHG | BODY MASS INDEX: 26.51 KG/M2 | OXYGEN SATURATION: 95 % | DIASTOLIC BLOOD PRESSURE: 58 MMHG | HEIGHT: 67 IN | HEART RATE: 56 BPM

## 2024-08-05 DIAGNOSIS — Z23 NEED FOR VACCINATION: Primary | ICD-10-CM

## 2024-08-05 DIAGNOSIS — I70.0 AORTIC ATHEROSCLEROSIS: Chronic | ICD-10-CM

## 2024-08-05 DIAGNOSIS — N40.1 BENIGN NON-NODULAR PROSTATIC HYPERPLASIA WITH LOWER URINARY TRACT SYMPTOMS: Chronic | ICD-10-CM

## 2024-08-05 DIAGNOSIS — I10 ESSENTIAL HYPERTENSION: Primary | Chronic | ICD-10-CM

## 2024-08-05 DIAGNOSIS — E78.00 PURE HYPERCHOLESTEROLEMIA: ICD-10-CM

## 2024-08-05 PROCEDURE — 99214 OFFICE O/P EST MOD 30 MIN: CPT | Mod: S$PBB,,, | Performed by: INTERNAL MEDICINE

## 2024-08-05 PROCEDURE — 91322 SARSCOV2 VAC 50 MCG/0.5ML IM: CPT | Mod: PBBFAC

## 2024-08-05 PROCEDURE — 99999 PR PBB SHADOW E&M-EST. PATIENT-LVL III: CPT | Mod: PBBFAC,,, | Performed by: INTERNAL MEDICINE

## 2024-08-05 PROCEDURE — 90480 ADMN SARSCOV2 VAC 1/ONLY CMP: CPT | Mod: PBBFAC

## 2024-08-05 PROCEDURE — 99213 OFFICE O/P EST LOW 20 MIN: CPT | Mod: PBBFAC,25 | Performed by: INTERNAL MEDICINE

## 2024-08-05 PROCEDURE — G2211 COMPLEX E/M VISIT ADD ON: HCPCS | Mod: S$PBB,,, | Performed by: INTERNAL MEDICINE

## 2024-08-05 PROCEDURE — 99999PBSHW COVID-19 VAC, MRNA 2023 (MODERNA)(PF) 50 MCG/0.5 ML IM SUSR (12+): Mod: PBBFAC,,,

## 2024-08-05 RX ORDER — ATORVASTATIN CALCIUM 40 MG/1
40 TABLET, FILM COATED ORAL DAILY
Qty: 90 TABLET | Refills: 3 | Status: SHIPPED | OUTPATIENT
Start: 2024-08-05

## 2024-08-05 RX ORDER — LISINOPRIL AND HYDROCHLOROTHIAZIDE 10; 12.5 MG/1; MG/1
1 TABLET ORAL DAILY
Qty: 90 TABLET | Refills: 3 | Status: SHIPPED | OUTPATIENT
Start: 2024-08-05

## 2024-08-05 RX ORDER — TAMSULOSIN HYDROCHLORIDE 0.4 MG/1
1 CAPSULE ORAL DAILY
Qty: 90 CAPSULE | Refills: 3 | Status: SHIPPED | OUTPATIENT
Start: 2024-08-05

## 2024-08-27 DIAGNOSIS — Z00.00 ENCOUNTER FOR MEDICARE ANNUAL WELLNESS EXAM: ICD-10-CM

## 2024-09-23 ENCOUNTER — PATIENT MESSAGE (OUTPATIENT)
Dept: ADMINISTRATIVE | Facility: OTHER | Age: 79
End: 2024-09-23
Payer: MEDICARE

## 2024-10-29 ENCOUNTER — OFFICE VISIT (OUTPATIENT)
Dept: URGENT CARE | Facility: CLINIC | Age: 79
End: 2024-10-29
Payer: MEDICARE

## 2024-10-29 VITALS
HEIGHT: 67 IN | WEIGHT: 168 LBS | RESPIRATION RATE: 18 BRPM | SYSTOLIC BLOOD PRESSURE: 152 MMHG | BODY MASS INDEX: 26.37 KG/M2 | OXYGEN SATURATION: 96 % | HEART RATE: 51 BPM | TEMPERATURE: 99 F | DIASTOLIC BLOOD PRESSURE: 75 MMHG

## 2024-10-29 DIAGNOSIS — S01.81XA FOREHEAD LACERATION, INITIAL ENCOUNTER: Primary | ICD-10-CM

## 2024-10-29 DIAGNOSIS — Z23 NEED FOR TETANUS BOOSTER: ICD-10-CM

## 2024-11-02 ENCOUNTER — OFFICE VISIT (OUTPATIENT)
Dept: URGENT CARE | Facility: CLINIC | Age: 79
End: 2024-11-02
Payer: MEDICARE

## 2024-11-02 VITALS
HEIGHT: 67 IN | HEART RATE: 48 BPM | WEIGHT: 170.19 LBS | BODY MASS INDEX: 26.71 KG/M2 | DIASTOLIC BLOOD PRESSURE: 75 MMHG | OXYGEN SATURATION: 97 % | RESPIRATION RATE: 16 BRPM | SYSTOLIC BLOOD PRESSURE: 148 MMHG | TEMPERATURE: 98 F

## 2024-11-02 DIAGNOSIS — S01.81XD LACERATION OF FOREHEAD, SUBSEQUENT ENCOUNTER: Primary | ICD-10-CM

## 2024-11-02 PROCEDURE — 99213 OFFICE O/P EST LOW 20 MIN: CPT | Mod: S$GLB,,, | Performed by: FAMILY MEDICINE

## 2024-11-02 RX ORDER — MUPIROCIN 20 MG/G
OINTMENT TOPICAL 3 TIMES DAILY
Qty: 30 G | Refills: 0 | Status: SHIPPED | OUTPATIENT
Start: 2024-11-02

## 2024-11-02 RX ORDER — MUPIROCIN 20 MG/G
OINTMENT TOPICAL
Status: COMPLETED | OUTPATIENT
Start: 2024-11-02 | End: 2024-11-02

## 2024-11-02 RX ADMIN — MUPIROCIN: 20 OINTMENT TOPICAL at 02:11

## 2024-11-19 ENCOUNTER — OFFICE VISIT (OUTPATIENT)
Dept: UROLOGY | Facility: CLINIC | Age: 79
End: 2024-11-19
Payer: MEDICARE

## 2024-11-19 ENCOUNTER — OFFICE VISIT (OUTPATIENT)
Dept: INTERNAL MEDICINE | Facility: CLINIC | Age: 79
End: 2024-11-19
Payer: MEDICARE

## 2024-11-19 VITALS
WEIGHT: 170 LBS | SYSTOLIC BLOOD PRESSURE: 132 MMHG | DIASTOLIC BLOOD PRESSURE: 72 MMHG | OXYGEN SATURATION: 98 % | HEART RATE: 56 BPM | BODY MASS INDEX: 26.62 KG/M2

## 2024-11-19 VITALS — BODY MASS INDEX: 26.71 KG/M2 | WEIGHT: 170.5 LBS

## 2024-11-19 DIAGNOSIS — R10.9 RIGHT FLANK PAIN: ICD-10-CM

## 2024-11-19 DIAGNOSIS — R31.9 HEMATURIA, UNSPECIFIED TYPE: Primary | ICD-10-CM

## 2024-11-19 DIAGNOSIS — N22 CALCULUS OF URINARY TRACT IN DISEASES CLASSIFIED ELSEWHERE: ICD-10-CM

## 2024-11-19 LAB
BILIRUB SERPL-MCNC: NEGATIVE MG/DL
BLOOD URINE, POC: NEGATIVE
CLARITY, POC UA: NORMAL
COLOR, POC UA: NORMAL
GLUCOSE UR QL STRIP: NEGATIVE
KETONES UR QL STRIP: NEGATIVE
LEUKOCYTE ESTERASE URINE, POC: NEGATIVE
NITRITE, POC UA: NEGATIVE
PH, POC UA: 7.5
PROTEIN, POC: NEGATIVE
SPECIFIC GRAVITY, POC UA: 1.01
UROBILINOGEN, POC UA: 0.2

## 2024-11-19 PROCEDURE — 81002 URINALYSIS NONAUTO W/O SCOPE: CPT | Mod: PBBFAC

## 2024-11-19 PROCEDURE — 99214 OFFICE O/P EST MOD 30 MIN: CPT | Mod: PBBFAC

## 2024-11-19 PROCEDURE — 99999 PR PBB SHADOW E&M-EST. PATIENT-LVL III: CPT | Mod: PBBFAC,,, | Performed by: UROLOGY

## 2024-11-19 PROCEDURE — 99204 OFFICE O/P NEW MOD 45 MIN: CPT | Mod: S$PBB,,, | Performed by: UROLOGY

## 2024-11-19 PROCEDURE — 99999 PR PBB SHADOW E&M-EST. PATIENT-LVL IV: CPT | Mod: PBBFAC,,,

## 2024-11-19 PROCEDURE — 81001 URINALYSIS AUTO W/SCOPE: CPT

## 2024-11-19 PROCEDURE — 87086 URINE CULTURE/COLONY COUNT: CPT

## 2024-11-19 PROCEDURE — 99213 OFFICE O/P EST LOW 20 MIN: CPT | Mod: PBBFAC,27 | Performed by: UROLOGY

## 2024-11-19 PROCEDURE — 99999PBSHW POCT URINE DIPSTICK WITHOUT MICROSCOPE: Mod: PBBFAC,,,

## 2024-11-19 NOTE — PROGRESS NOTES
INTERNAL MEDICINE URGENT CARE NOTE    CHIEF COMPLAINT     Hematuria and back pain    HPI     Johnathan Waters is a 79 y.o. male with a history of hypertension, hyperlipidemia, BPH, and aortic atherosclerosis who presents with hematuria and back pain for an urgent care visit today. Dr. Bernard Davalos is his PCP. This patient is new to me.    Hematuria  Pt states he experienced hematuria without pain two days ago, on Sunday.  This was a singular occurrence with minimal blood.  A small clot was passed.  Pt's daily medication includes aspirin 81 mg  Pt denies unilateral flank pain with radiation to the lower abd/groin, severe colicky pain, or difficulty urinating  There is no associated urinary frequency, burning sensation, urgency, dysuria, inability to empty bladder, odorous urine, reduced urine output, flank/back pain, thirst, dizziness, fatigue, black/tarry stools, unusual bruising/bleeding, nausea, fever, rash or joint pain. Reports feeling well.     Back Pain  Pt states he has right flank/back pain that began 2 wks ago. He thought it was possibly muscular in origin, as he participates in spin classes 3d/wk (for years). Sunday he experienced sharp pain (8/10), at it's worst. That day when voiding he passed a small blood clot in his urine and had a drop of blood on his underwear. Since then, the quality and intensity of the pain has lessened. Presently it is described as dull with 1/10 pain.    There is no associated saddle anesthesia, fecal incontinence, bilateral lower extremity weakness, numbness tingling, radiating pain, fever, unexplained weight loss, night sweates, or dysuria. The patient does not report any recent injuries or falls.  The pain is slightly worse with movement and improved with Naproxen. (None taken since Sunday) . Presently he does not feel any back pain.    Past Medical History:  Past Medical History:   Diagnosis Date    Elevated cholesterol     Hypertension     Squamous cell carcinoma      under right eye.  Sees outside Dermatology    Trigger ring finger of right hand 1/30/2019       Home Medications:  Prior to Admission medications    Medication Sig Start Date End Date Taking? Authorizing Provider   aspirin (ECOTRIN) 81 MG EC tablet Take 1 tablet (81 mg total) by mouth once daily. 6/14/23   Luis Antonio Small MD   atorvastatin (LIPITOR) 40 MG tablet Take 1 tablet (40 mg total) by mouth once daily. 8/5/24   Bernard Davalos MD   cetirizine (ZYRTEC) 10 MG tablet  2/28/23   Provider, Historical   clindamycin (CLEOCIN T) 1 % external solution APPLY TOPICALLY TO FACE TWICE DAILY AS NEEDED 7/18/23   Provider, Historical   fluticasone propionate (FLONASE) 50 mcg/actuation nasal spray SHAKE LIQUID AND USE 1 TO 2 SPRAYS(50  MCG) IN EACH NOSTRIL DAILY AS NEEDED FOR RHINITIS OR CONGESTION  Patient not taking: Reported on 11/2/2024 12/14/23   Bernard Davalos MD   lisinopriL-hydrochlorothiazide (PRINZIDE,ZESTORETIC) 10-12.5 mg per tablet Take 1 tablet by mouth once daily. 8/5/24   Bernard Davalos MD   meloxicam (MOBIC) 7.5 MG tablet TAKE 1 TABLET(7.5 MG) BY MOUTH EVERY DAY  Patient not taking: Reported on 11/2/2024 12/15/23   Gaby Martinez MD   mometasone 0.1% (ELOCON) 0.1 % cream Apply topically daily as needed (rash). 7/30/21   Bernard Davalos MD   mupirocin (BACTROBAN) 2 % ointment Apply topically 3 (three) times daily. To forehead 11/2/24   Angle Roy NP   tamsulosin (FLOMAX) 0.4 mg Cap Take 1 capsule (0.4 mg total) by mouth once daily. 8/5/24   Bernard Davalos MD       Review of Systems:  Review of Systems   Constitutional:  Negative for activity change, chills, fatigue and fever.   HENT:  Positive for rhinorrhea (history of). Negative for sinus pressure and sinus pain.    Respiratory:  Negative for cough, chest tightness and shortness of breath.    Cardiovascular:  Negative for chest pain, palpitations and leg swelling.   Gastrointestinal:  Negative for abdominal  distention, abdominal pain, blood in stool, diarrhea, nausea, rectal pain and vomiting.   Genitourinary:  Positive for hematuria. Negative for decreased urine volume, difficulty urinating, dysuria, frequency, penile pain, testicular pain and urgency. Flank pain: improving.  Musculoskeletal:  Positive for back pain.   Skin:  Negative for rash.   Neurological:  Negative for dizziness, weakness, numbness and headaches.   Hematological:  Negative for adenopathy.   Psychiatric/Behavioral: Negative.         Health Maintainence:   Immunizations:  Health Maintenance         Date Due Completion Date    Aspirin/Antiplatelet Therapy 11/02/2025 11/2/2024    Lipid Panel 07/29/2029 7/29/2024    Colonoscopy 09/22/2030 9/22/2020    Override on 6/1/2017: Declined (will proceed with stool cards instead)    Override on 3/3/2006: Done (normal.  In [facility] documents)    TETANUS VACCINE 10/29/2034 10/29/2024             PHYSICAL EXAM     There were no vitals taken for this visit.    Physical Exam  Vitals reviewed.   Constitutional:       Appearance: He is well-developed.   HENT:      Head: Normocephalic.      Right Ear: External ear normal.      Left Ear: External ear normal.      Nose: Nose normal.      Mouth/Throat:      Pharynx: No oropharyngeal exudate.   Eyes:      Pupils: Pupils are equal, round, and reactive to light.   Neck:      Thyroid: No thyromegaly.      Vascular: No JVD.      Trachea: No tracheal deviation.   Cardiovascular:      Rate and Rhythm: Normal rate and regular rhythm.      Heart sounds: Murmur heard.      No friction rub. No gallop.   Pulmonary:      Effort: No respiratory distress.      Breath sounds: Normal breath sounds. No wheezing or rales.   Chest:      Chest wall: No tenderness.   Abdominal:      General: Bowel sounds are normal. There is no distension.      Palpations: Abdomen is soft.      Tenderness: There is no abdominal tenderness. There is right CVA tenderness.   Musculoskeletal:         General: No  "tenderness. Normal range of motion.   Lymphadenopathy:      Cervical: No cervical adenopathy.   Skin:     General: Skin is warm and dry.      Findings: No rash.   Neurological:      Mental Status: He is alert and oriented to person, place, and time.   Psychiatric:         Behavior: Behavior normal.         LABS     No results found for: "LABA1C", "HGBA1C"  CMP  Sodium   Date Value Ref Range Status   07/29/2024 140 136 - 145 mmol/L Final     Potassium   Date Value Ref Range Status   07/29/2024 3.8 3.5 - 5.1 mmol/L Final     Chloride   Date Value Ref Range Status   07/29/2024 104 95 - 110 mmol/L Final     CO2   Date Value Ref Range Status   07/29/2024 27 23 - 29 mmol/L Final     Glucose   Date Value Ref Range Status   07/29/2024 97 70 - 110 mg/dL Final     BUN   Date Value Ref Range Status   07/29/2024 17 8 - 23 mg/dL Final     Creatinine   Date Value Ref Range Status   07/29/2024 1.0 0.5 - 1.4 mg/dL Final     Calcium   Date Value Ref Range Status   07/29/2024 9.3 8.7 - 10.5 mg/dL Final     Total Protein   Date Value Ref Range Status   07/29/2024 6.4 6.0 - 8.4 g/dL Final     Albumin   Date Value Ref Range Status   07/29/2024 3.6 3.5 - 5.2 g/dL Final     Total Bilirubin   Date Value Ref Range Status   07/29/2024 0.8 0.1 - 1.0 mg/dL Final     Comment:     For infants and newborns, interpretation of results should be based  on gestational age, weight and in agreement with clinical  observations.    Premature Infant recommended reference ranges:  Up to 24 hours.............<8.0 mg/dL  Up to 48 hours............<12.0 mg/dL  3-5 days..................<15.0 mg/dL  6-29 days.................<15.0 mg/dL       Alkaline Phosphatase   Date Value Ref Range Status   07/29/2024 57 55 - 135 U/L Final     AST   Date Value Ref Range Status   07/29/2024 16 10 - 40 U/L Final     ALT   Date Value Ref Range Status   07/29/2024 30 10 - 44 U/L Final     Anion Gap   Date Value Ref Range Status   07/29/2024 9 8 - 16 mmol/L Final     eGFR if " "   Date Value Ref Range Status   07/29/2022 >60.0 >60 mL/min/1.73 m^2 Final     eGFR if non    Date Value Ref Range Status   07/29/2022 >60.0 >60 mL/min/1.73 m^2 Final     Comment:     Calculation used to obtain the estimated glomerular filtration  rate (eGFR) is the CKD-EPI equation.        Lab Results   Component Value Date    WBC 8.55 07/29/2024    HGB 13.3 (L) 07/29/2024    HCT 40.6 07/29/2024    MCV 88 07/29/2024     07/29/2024     Lab Results   Component Value Date    CHOL 113 (L) 07/29/2024    CHOL 109 (L) 07/26/2023    CHOL 171 07/29/2022     Lab Results   Component Value Date    HDL 50 07/29/2024    HDL 50 07/26/2023    HDL 54 07/29/2022     Lab Results   Component Value Date    LDLCALC 48.2 (L) 07/29/2024    LDLCALC 48.6 (L) 07/26/2023    LDLCALC 97.6 07/29/2022     Lab Results   Component Value Date    TRIG 74 07/29/2024    TRIG 52 07/26/2023    TRIG 97 07/29/2022     Lab Results   Component Value Date    CHOLHDL 44.2 07/29/2024    CHOLHDL 45.9 07/26/2023    CHOLHDL 31.6 07/29/2022     No results found for: "TSH", "L1SYLHC", "T7KCCAP", "THYROIDAB"    ASSESSMENT/PLAN     Johnathan Waters is a 79 y.o. male was seen today for back pain and hematuria.    Diagnoses and all orders for this visit:  Discussed with patient UTI vs nephrolithiasis cause of symptoms. Agreed on plan.     Hematuria, unspecified type  -     Urinalysis Microscopic  -     CULTURE, URINE  -     POCT URINE DIPSTICK WITHOUT MICROSCOPE  -     Ambulatory referral/consult to Urology; scheduled    Calculus of urinary tract in diseases classified elsewhere   Ambulatory referral/consult to Urology   Nephrolithiasis education provided    Right flank pain   Urinalysis Microscopic  -     CULTURE, URINE  -     POCT URINE DIPSTICK WITHOUT MICROSCOPE  -     Ambulatory referral/consult to Urology; scheduled     Patient education provided from [facility]. Patient was counseled on when and how to seek emergent care. "       Alisia RODRIGUEZ, APRN, FNP-c   Department of Internal Medicine - [facility]  7:52 AM

## 2024-11-19 NOTE — PATIENT INSTRUCTIONS
Apt with Urology  Increase water intake  Limit Sodium intake  Increase consumption of fruits and vegetables

## 2024-11-19 NOTE — PROGRESS NOTES
Subjective:       Patient ID: Johnathan Waters is a 79 y.o. male who was referred by Alisia Vu NP-C    Chief Complaint:   No chief complaint on file.      Hematuria  Patient complains of gross hematuria. Onset of hematuria was a few days ago and was gradual in onset. There is not a history of nephrolithiasis. There is not a history of urologic trauma. Other urologic symptoms include slow stream, hesitancy, intermittency. Patient admits to history of no risk factors for cancer. Patient denies history of tobacco use. Prior workup has been UA'S.        ACTIVE MEDICAL ISSUES:  Patient Active Problem List   Diagnosis    Essential hypertension    Pure hypercholesterolemia    Benign non-nodular prostatic hyperplasia with lower urinary tract symptoms    Trigger ring finger of right hand    Acquired hallux rigidus of left foot    Trigger finger, left ring finger    Decreased  strength of left hand    Aortic atherosclerosis       PAST MEDICAL HISTORY  Past Medical History:   Diagnosis Date    Elevated cholesterol     Hypertension     Squamous cell carcinoma     under right eye.  Sees outside Dermatology    Trigger ring finger of right hand 1/30/2019       PAST SURGICAL HISTORY:  Past Surgical History:   Procedure Laterality Date    COLONOSCOPY N/A 9/22/2020    Procedure: COLONOSCOPY;  Surgeon: Manuela Ward MD;  Location: [facility] ENDO;  Service: Endoscopy;  Laterality: N/A;    ECHOCARDIOGRAM,TRANSESOPHAGEAL N/A 8/14/2023    Procedure: Transesophageal echo (TRISH) intra-procedure log documentation;  Surgeon: Luis Antonio Small MD;  Location: [facility] CATH LAB;  Service: Cardiology;  Laterality: N/A;  with bubble study  RN PREOP 8/10/2023    EYE SURGERY      INSERTION OF IMPLANTABLE LOOP RECORDER N/A 10/4/2023    Procedure: Insertion, Implantable Loop Recorder;  Surgeon: Alis Bronson MD;  Location: [facility] CATH LAB;  Service: Cardiology;  Laterality: N/A;  RN PREOP 9/29/2023---INCOMPLETE CONSENT---md notified--pt     TRIGGER FINGER RELEASE Left 3/21/2023    Procedure: RELEASE, TRIGGER FINGER;  Surgeon: Gaby Martinez MD;  Location: [facility];  Service: Orthopedics;  Laterality: Left;  RN PREOP 03/14/2023---NEED H/P    VASECTOMY         SOCIAL HISTORY:  Social History     Tobacco Use    Smoking status: Never    Smokeless tobacco: Never   Substance Use Topics    Alcohol use: Yes     Comment: Rarely    Drug use: No       FAMILY HISTORY:  Family History   Problem Relation Name Age of Onset    Hypertension Mother      Cancer Father          lung    Diabetes Sister      Asthma Daughter Yaima     No Known Problems Daughter Emelia        ALLERGIES AND MEDICATIONS: updated and reviewed.  Review of patient's allergies indicates:   Allergen Reactions    Wasp venom Itching and Swelling    Sulfa (sulfonamide antibiotics) Other (See Comments)     Patient unsure, toddler     Current Outpatient Medications   Medication Sig    aspirin (ECOTRIN) 81 MG EC tablet Take 1 tablet (81 mg total) by mouth once daily.    atorvastatin (LIPITOR) 40 MG tablet Take 1 tablet (40 mg total) by mouth once daily.    cetirizine (ZYRTEC) 10 MG tablet     clindamycin (CLEOCIN T) 1 % external solution APPLY TOPICALLY TO FACE TWICE DAILY AS NEEDED    fluticasone propionate (FLONASE) 50 mcg/actuation nasal spray SHAKE LIQUID AND USE 1 TO 2 SPRAYS(50  MCG) IN EACH NOSTRIL DAILY AS NEEDED FOR RHINITIS OR CONGESTION    lisinopriL-hydrochlorothiazide (PRINZIDE,ZESTORETIC) 10-12.5 mg per tablet Take 1 tablet by mouth once daily.    meloxicam (MOBIC) 7.5 MG tablet TAKE 1 TABLET(7.5 MG) BY MOUTH EVERY DAY    mometasone 0.1% (ELOCON) 0.1 % cream Apply topically daily as needed (rash).    mupirocin (BACTROBAN) 2 % ointment Apply topically 3 (three) times daily. To forehead    tamsulosin (FLOMAX) 0.4 mg Cap Take 1 capsule (0.4 mg total) by mouth once daily.     No current facility-administered medications for this visit.       Review of Systems   Constitutional:   Negative for chills and fever.   HENT:  Negative for congestion.    Respiratory:  Negative for chest tightness and shortness of breath.    Cardiovascular:  Negative for chest pain and palpitations.   Gastrointestinal:  Negative for abdominal pain, constipation, diarrhea, nausea and vomiting.   Genitourinary:  Negative for difficulty urinating, dysuria, flank pain, hematuria and urgency.   Musculoskeletal:  Negative for arthralgias.   Neurological:  Negative for dizziness.   Psychiatric/Behavioral:  Negative for confusion.        Objective:      Vitals:    11/19/24 1315   Weight: 77.3 kg (170 lb 8.4 oz)     Physical Exam  Vitals and nursing note reviewed.   Constitutional:       Appearance: He is well-developed.   HENT:      Head: Normocephalic.   Eyes:      Conjunctiva/sclera: Conjunctivae normal.   Neck:      Thyroid: No thyromegaly.      Trachea: No tracheal deviation.   Cardiovascular:      Rate and Rhythm: Normal rate.      Heart sounds: Normal heart sounds.   Pulmonary:      Effort: Pulmonary effort is normal. No respiratory distress.      Breath sounds: Normal breath sounds. No wheezing.   Abdominal:      General: Bowel sounds are normal.      Palpations: Abdomen is soft.      Tenderness: There is no abdominal tenderness. There is no rebound.      Hernia: No hernia is present.   Musculoskeletal:         General: No tenderness. Normal range of motion.      Cervical back: Normal range of motion and neck supple.   Lymphadenopathy:      Cervical: No cervical adenopathy.   Skin:     General: Skin is warm and dry.      Findings: No erythema or rash.   Neurological:      Mental Status: He is alert and oriented to person, place, and time.   Psychiatric:         Behavior: Behavior normal.         Thought Content: Thought content normal.         Judgment: Judgment normal.             Component 09:32   Glucose, UA negative   Bilirubin, POC negative   Ketones, UA negative   Spec Grav UA 1.015   Blood, UA negative   pH, UA  7.5   Protein, POC negative   Urobilinogen, UA 0.2   Nitrite, UA negative   WBC, UA negative   Color, UA    Clarity, UA              Specimen Collected: 11/19/24 09:32 CST       Assessment:       1. Hematuria, unspecified type    2. Right flank pain          Plan:       1. Hematuria, unspecified type  Will need cystoscopy , but I want to look the CT first    - Ambulatory referral/consult to Urology  - CT Urogram Abd Pelvis W WO; Future  - Basic Metabolic Panel; Future    2. Right flank pain  As above            Follow up in about 2 weeks (around 12/3/2024) for Follow up Established.

## 2024-11-20 ENCOUNTER — LAB VISIT (OUTPATIENT)
Dept: LAB | Facility: HOSPITAL | Age: 79
End: 2024-11-20
Attending: INTERNAL MEDICINE
Payer: MEDICARE

## 2024-11-20 DIAGNOSIS — R31.9 HEMATURIA, UNSPECIFIED TYPE: ICD-10-CM

## 2024-11-20 DIAGNOSIS — I10 ESSENTIAL HYPERTENSION: Chronic | ICD-10-CM

## 2024-11-20 LAB
ANION GAP SERPL CALC-SCNC: 8 MMOL/L (ref 8–16)
BACTERIA #/AREA URNS AUTO: NORMAL /HPF
BACTERIA UR CULT: NO GROWTH
BUN SERPL-MCNC: 21 MG/DL (ref 8–23)
CALCIUM SERPL-MCNC: 9.2 MG/DL (ref 8.7–10.5)
CHLORIDE SERPL-SCNC: 104 MMOL/L (ref 95–110)
CO2 SERPL-SCNC: 29 MMOL/L (ref 23–29)
CREAT SERPL-MCNC: 1 MG/DL (ref 0.5–1.4)
ERYTHROCYTE [DISTWIDTH] IN BLOOD BY AUTOMATED COUNT: 13.4 % (ref 11.5–14.5)
EST. GFR  (NO RACE VARIABLE): >60 ML/MIN/1.73 M^2
GLUCOSE SERPL-MCNC: 90 MG/DL (ref 70–110)
HCT VFR BLD AUTO: 43.6 % (ref 40–54)
HGB BLD-MCNC: 13.8 G/DL (ref 14–18)
HYALINE CASTS UR QL AUTO: 1 /LPF
MCH RBC QN AUTO: 29.1 PG (ref 27–31)
MCHC RBC AUTO-ENTMCNC: 31.7 G/DL (ref 32–36)
MCV RBC AUTO: 92 FL (ref 82–98)
MICROSCOPIC COMMENT: NORMAL
PLATELET # BLD AUTO: 290 K/UL (ref 150–450)
PMV BLD AUTO: 12.4 FL (ref 9.2–12.9)
POTASSIUM SERPL-SCNC: 4 MMOL/L (ref 3.5–5.1)
RBC # BLD AUTO: 4.75 M/UL (ref 4.6–6.2)
RBC #/AREA URNS AUTO: 1 /HPF (ref 0–4)
SODIUM SERPL-SCNC: 141 MMOL/L (ref 136–145)
WBC # BLD AUTO: 8.82 K/UL (ref 3.9–12.7)
WBC #/AREA URNS AUTO: 1 /HPF (ref 0–5)

## 2024-11-20 PROCEDURE — 36415 COLL VENOUS BLD VENIPUNCTURE: CPT | Mod: PO | Performed by: UROLOGY

## 2024-11-20 PROCEDURE — 85027 COMPLETE CBC AUTOMATED: CPT | Performed by: INTERNAL MEDICINE

## 2024-11-20 PROCEDURE — 80048 BASIC METABOLIC PNL TOTAL CA: CPT | Performed by: UROLOGY

## 2024-11-24 ENCOUNTER — HOSPITAL ENCOUNTER (OUTPATIENT)
Dept: CARDIOLOGY | Facility: HOSPITAL | Age: 79
Discharge: HOME | End: 2024-11-24
Attending: INTERNAL MEDICINE
Payer: MEDICARE

## 2024-11-24 ENCOUNTER — CLINICAL SUPPORT (OUTPATIENT)
Dept: CARDIOLOGY | Facility: HOSPITAL | Age: 79
End: 2024-11-24
Payer: MEDICARE

## 2024-11-24 DIAGNOSIS — I49.8 OTHER SPECIFIED CARDIAC ARRHYTHMIAS: ICD-10-CM

## 2024-11-24 PROCEDURE — 93298 REM INTERROG DEV EVAL SCRMS: CPT | Mod: 26,,, | Performed by: INTERNAL MEDICINE

## 2024-11-25 ENCOUNTER — HOSPITAL ENCOUNTER (OUTPATIENT)
Dept: RADIOLOGY | Facility: HOSPITAL | Age: 79
Discharge: HOME | End: 2024-11-25
Attending: UROLOGY
Payer: MEDICARE

## 2024-11-25 DIAGNOSIS — R31.9 HEMATURIA, UNSPECIFIED TYPE: ICD-10-CM

## 2024-11-25 PROCEDURE — 74178 CT ABD&PLV WO CNTR FLWD CNTR: CPT | Mod: 26,,, | Performed by: RADIOLOGY

## 2024-11-25 PROCEDURE — 74178 CT ABD&PLV WO CNTR FLWD CNTR: CPT | Mod: TC

## 2024-11-25 PROCEDURE — 25500020 PHARM REV CODE 255: Performed by: UROLOGY

## 2024-11-25 RX ADMIN — IOHEXOL 125 ML: 350 INJECTION, SOLUTION INTRAVENOUS at 11:11

## 2024-12-05 ENCOUNTER — OFFICE VISIT (OUTPATIENT)
Dept: UROLOGY | Facility: CLINIC | Age: 79
End: 2024-12-05
Payer: MEDICARE

## 2024-12-05 VITALS — BODY MASS INDEX: 27.07 KG/M2 | WEIGHT: 172.81 LBS

## 2024-12-05 DIAGNOSIS — R31.9 HEMATURIA, UNSPECIFIED TYPE: Primary | ICD-10-CM

## 2024-12-05 DIAGNOSIS — R10.9 RIGHT FLANK PAIN: ICD-10-CM

## 2024-12-05 PROCEDURE — 99213 OFFICE O/P EST LOW 20 MIN: CPT | Mod: S$PBB,,, | Performed by: UROLOGY

## 2024-12-05 PROCEDURE — 99213 OFFICE O/P EST LOW 20 MIN: CPT | Mod: PBBFAC | Performed by: UROLOGY

## 2024-12-05 PROCEDURE — 99999 PR PBB SHADOW E&M-EST. PATIENT-LVL III: CPT | Mod: PBBFAC,,, | Performed by: UROLOGY

## 2024-12-05 NOTE — PROGRESS NOTES
Subjective:       Patient ID: Johnathan Waters is a 79 y.o. male The patient's last visit with me was on 11/19/2024.     Chief Complaint:   Chief Complaint   Patient presents with    Follow-up    Results       Hematuria  Patient complains of gross hematuria. Onset of hematuria was a few days ago and was gradual in onset. There is not a history of nephrolithiasis. There is not a history of urologic trauma. Other urologic symptoms include slow stream, hesitancy, intermittency. Patient admits to history of no risk factors for cancer. Patient denies history of tobacco use. Prior workup has been UA'S.    12/05/2024  He is back with a CT scan. He denies any further pain.        ACTIVE MEDICAL ISSUES:  Patient Active Problem List   Diagnosis    Essential hypertension    Pure hypercholesterolemia    Benign non-nodular prostatic hyperplasia with lower urinary tract symptoms    Trigger ring finger of right hand    Acquired hallux rigidus of left foot    Trigger finger, left ring finger    Decreased  strength of left hand    Aortic atherosclerosis       PAST MEDICAL HISTORY  Past Medical History:   Diagnosis Date    Elevated cholesterol     Hypertension     Squamous cell carcinoma     under right eye.  Sees outside Dermatology    Trigger ring finger of right hand 1/30/2019       PAST SURGICAL HISTORY:  Past Surgical History:   Procedure Laterality Date    COLONOSCOPY N/A 9/22/2020    Procedure: COLONOSCOPY;  Surgeon: Manuela Ward MD;  Location: [facility] ENDO;  Service: Endoscopy;  Laterality: N/A;    ECHOCARDIOGRAM,TRANSESOPHAGEAL N/A 8/14/2023    Procedure: Transesophageal echo (TRISH) intra-procedure log documentation;  Surgeon: Luis Antonio Small MD;  Location: [facility] CATH LAB;  Service: Cardiology;  Laterality: N/A;  with bubble study  RN PREOP 8/10/2023    EYE SURGERY      INSERTION OF IMPLANTABLE LOOP RECORDER N/A 10/4/2023    Procedure: Insertion, Implantable Loop Recorder;  Surgeon: Alis Bronson MD;   Location: [facility] CATH LAB;  Service: Cardiology;  Laterality: N/A;  RN PREOP 9/29/2023---INCOMPLETE CONSENT---md notified--pt    TRIGGER FINGER RELEASE Left 3/21/2023    Procedure: RELEASE, TRIGGER FINGER;  Surgeon: Gaby Martinez MD;  Location: [facility] OR;  Service: Orthopedics;  Laterality: Left;  RN PREOP 03/14/2023---NEED H/P    VASECTOMY         SOCIAL HISTORY:  Social History     Tobacco Use    Smoking status: Never    Smokeless tobacco: Never   Substance Use Topics    Alcohol use: Yes     Comment: Rarely    Drug use: No       FAMILY HISTORY:  Family History   Problem Relation Name Age of Onset    Hypertension Mother      Cancer Father          lung    Diabetes Sister      Asthma Daughter Yaima     No Known Problems Daughter Emelia        ALLERGIES AND MEDICATIONS: updated and reviewed.  Review of patient's allergies indicates:   Allergen Reactions    Wasp venom Itching and Swelling    Sulfa (sulfonamide antibiotics) Other (See Comments)     Patient unsure, toddler     Current Outpatient Medications   Medication Sig    aspirin (ECOTRIN) 81 MG EC tablet Take 1 tablet (81 mg total) by mouth once daily.    atorvastatin (LIPITOR) 40 MG tablet Take 1 tablet (40 mg total) by mouth once daily.    cetirizine (ZYRTEC) 10 MG tablet     clindamycin (CLEOCIN T) 1 % external solution APPLY TOPICALLY TO FACE TWICE DAILY AS NEEDED    fluticasone propionate (FLONASE) 50 mcg/actuation nasal spray SHAKE LIQUID AND USE 1 TO 2 SPRAYS(50  MCG) IN EACH NOSTRIL DAILY AS NEEDED FOR RHINITIS OR CONGESTION    lisinopriL-hydrochlorothiazide (PRINZIDE,ZESTORETIC) 10-12.5 mg per tablet Take 1 tablet by mouth once daily.    meloxicam (MOBIC) 7.5 MG tablet TAKE 1 TABLET(7.5 MG) BY MOUTH EVERY DAY    mometasone 0.1% (ELOCON) 0.1 % cream Apply topically daily as needed (rash).    mupirocin (BACTROBAN) 2 % ointment Apply topically 3 (three) times daily. To forehead    tamsulosin (FLOMAX) 0.4 mg Cap Take 1 capsule (0.4 mg total) by mouth  once daily.     No current facility-administered medications for this visit.       Review of Systems   Constitutional:  Negative for chills and fever.   HENT:  Negative for congestion.    Respiratory:  Negative for chest tightness and shortness of breath.    Cardiovascular:  Negative for chest pain and palpitations.   Gastrointestinal:  Negative for abdominal pain, constipation, diarrhea, nausea and vomiting.   Genitourinary:  Negative for difficulty urinating, dysuria, flank pain, hematuria and urgency.   Musculoskeletal:  Negative for arthralgias.   Neurological:  Negative for dizziness.   Psychiatric/Behavioral:  Negative for confusion.        Objective:      Vitals:    12/05/24 1538   Weight: 78.4 kg (172 lb 13.5 oz)       Physical Exam  Vitals and nursing note reviewed.   Constitutional:       Appearance: He is well-developed.   HENT:      Head: Normocephalic.   Eyes:      Conjunctiva/sclera: Conjunctivae normal.   Neck:      Thyroid: No thyromegaly.      Trachea: No tracheal deviation.   Cardiovascular:      Rate and Rhythm: Normal rate.      Heart sounds: Normal heart sounds.   Pulmonary:      Effort: Pulmonary effort is normal. No respiratory distress.      Breath sounds: Normal breath sounds. No wheezing.   Abdominal:      General: Bowel sounds are normal.      Palpations: Abdomen is soft.      Tenderness: There is no abdominal tenderness. There is no rebound.      Hernia: No hernia is present.   Musculoskeletal:         General: No tenderness. Normal range of motion.      Cervical back: Normal range of motion and neck supple.   Lymphadenopathy:      Cervical: No cervical adenopathy.   Skin:     General: Skin is warm and dry.      Findings: No erythema or rash.   Neurological:      Mental Status: He is alert and oriented to person, place, and time.   Psychiatric:         Behavior: Behavior normal.         Thought Content: Thought content normal.         Judgment: Judgment normal.           CT Urogram Abd  Pelvis W WO  Order: [id number]  Status: Final result       Visible to patient: Yes (seen)       Next appt: Today at 03:45 PM in Urology (Robbie Harper MD)       Dx: Hematuria, unspecified type    1 Result Note       1 Patient Communication  Details    Reading Physician Reading Date Result Priority   Naseem Barragan MD  [phone] 11/25/2024 Routine     Narrative & Impression  EXAMINATION:  CT UROGRAM ABD PELVIS W WO     CLINICAL HISTORY:  Hematuria, gross/macroscopic; Hematuria, unspecified     TECHNIQUE:  Low dose axial, sagittal and coronal reformations were obtained from the lung bases to the pubic symphysis before and following the IV administration of 125 mL of Omnipaque 350.  Timing was optimized for nephrogram and excretory renal phases.     COMPARISON:  None     FINDINGS:  Heart: No cardiomegaly or pericardial effusion.     Lung Bases: 0.6 cm pulmonary nodule in the right middle lobe (series 2, image 6).  Calcified granuloma in the left lower lobe.  Loop recorder partially visualized.     Liver: Normal in size and attenuation.  1.9 cm simple cyst in the right hepatic lobe near the dome.     Gallbladder: Normal appearance without evidence for cholecystitis.     Bile Ducts: No intra or extrahepatic biliary ductal dilation.     Pancreas: Multiple hypodensities within the pancreatic body and tail measuring up to 1.7 cm.  No associated pancreatic ductal dilatation.  Mild atrophy of the pancreas.     Spleen: Normal.     Adrenals: Normal.     Genitourinary: Normal in size and location.  Kidneys demonstrate a few hypodensities bilaterally consistent with simple cysts.  No evidence of renal stones or hydronephrosis.  Mild perinephric fat stranding bilaterally.  Ureters are normal in course and caliber.  Mild diffuse bladder wall thickening possibly due to under distension versus chronic bladder outlet obstruction.  Small diverticulum.     Reproductive organs: Prostate is enlarged measuring 4.8 x 5.8  cm.     GI tract/Mesentery: Stomach is normal appearance.  Visualized loops of small and large bowel are normal in caliber without evidence for inflammation or obstruction.     Peritoneal Space: No abdominopelvic ascites or intraperitoneal free air.     Retroperitoneum: No significant adenopathy.     Abdominal wall: Bilateral fat containing inguinal hernias.     Vasculature: Abdominal aorta is normal in caliber with moderate calcific atherosclerosis.     Bones: Normal appearance without acute fracture or bony destructive process.     Impression:     No renal stones, hydronephrosis, or renal mass.  A few hypodensities within the kidneys consistent with simple cysts.     Prostatomegaly and findings suggestive of mild chronic bladder outlet obstruction.     Multiple hypodensities within the pancreatic body and tail likely representing cystic lesions.  Further evaluation with MRI/MRCP without and with intravenous contrast is recommended.     0.6 cm pulmonary nodule in the right middle lobe.  For a solid nodule 6-8 mm, Fleischner Society 2017 guidelines recommend follow up with non-contrast chest CT at 6-12 months and 18-24 months after discovery.        Electronically signed by:Naseem Barragan MD  Date:                                            11/25/2024  Time:                                           12:46        Exam Ended: 11/25/24 11:35 CST     Assessment:       1. Hematuria, unspecified type    2. Right flank pain            Plan:       1. Hematuria, unspecified type (Primary)  I recommend a cystoscopy to complete the work up.  He is declining      2. Right flank pain  resolved           Follow up if symptoms worsen or fail to improve.

## 2024-12-09 ENCOUNTER — OFFICE VISIT (OUTPATIENT)
Dept: CARDIOLOGY | Facility: CLINIC | Age: 79
End: 2024-12-09
Payer: MEDICARE

## 2024-12-09 VITALS
RESPIRATION RATE: 18 BRPM | SYSTOLIC BLOOD PRESSURE: 110 MMHG | HEART RATE: 57 BPM | BODY MASS INDEX: 26.96 KG/M2 | DIASTOLIC BLOOD PRESSURE: 62 MMHG | OXYGEN SATURATION: 94 % | HEIGHT: 67 IN | WEIGHT: 171.75 LBS

## 2024-12-09 DIAGNOSIS — I65.22 CAROTID ATHEROSCLEROSIS, LEFT: ICD-10-CM

## 2024-12-09 DIAGNOSIS — Z86.69 HISTORY OF AMAUROSIS FUGAX: Primary | ICD-10-CM

## 2024-12-09 DIAGNOSIS — I10 ESSENTIAL HYPERTENSION: ICD-10-CM

## 2024-12-09 DIAGNOSIS — Z95.818 STATUS POST PLACEMENT OF IMPLANTABLE LOOP RECORDER: ICD-10-CM

## 2024-12-09 DIAGNOSIS — I70.0 AORTIC ATHEROSCLEROSIS: ICD-10-CM

## 2024-12-09 DIAGNOSIS — E78.2 MIXED HYPERLIPIDEMIA: ICD-10-CM

## 2024-12-09 LAB
OHS CV AF BURDEN PERCENT: < 1
OHS CV DC REMOTE DEVICE TYPE: NORMAL
OHS QRS DURATION: 86 MS
OHS QTC CALCULATION: 415 MS

## 2024-12-09 PROCEDURE — 99214 OFFICE O/P EST MOD 30 MIN: CPT | Mod: PBBFAC,PO | Performed by: INTERNAL MEDICINE

## 2024-12-09 PROCEDURE — 93010 ELECTROCARDIOGRAM REPORT: CPT | Mod: S$PBB,,, | Performed by: INTERNAL MEDICINE

## 2024-12-09 PROCEDURE — 93005 ELECTROCARDIOGRAM TRACING: CPT | Mod: PBBFAC,PO | Performed by: INTERNAL MEDICINE

## 2024-12-09 PROCEDURE — 99214 OFFICE O/P EST MOD 30 MIN: CPT | Mod: S$PBB,,, | Performed by: INTERNAL MEDICINE

## 2024-12-09 PROCEDURE — G2211 COMPLEX E/M VISIT ADD ON: HCPCS | Mod: S$PBB,,, | Performed by: INTERNAL MEDICINE

## 2024-12-09 PROCEDURE — 99999 PR PBB SHADOW E&M-EST. PATIENT-LVL IV: CPT | Mod: PBBFAC,,, | Performed by: INTERNAL MEDICINE

## 2024-12-09 NOTE — PROGRESS NOTES
CARDIOVASCULAR CONSULTATION    REASON FOR CONSULT:   Johnathan Waters is a 79 y.o. male who presents for f/u ?R eye amaurosis, ILR placement.    PCP: Susannah  Neuro: Kalpana  HISTORY OF PRESENT ILLNESS:   Last seen December 2023    The patient returns for follow up.  He was last seen 1 year ago.  In the interim since his last office visit, he denies any further episodes of amaurosis.  He otherwise denies angina, dyspnea, palpitations, or syncope.  There has been no PND, orthopnea, melena, or claudication symptoms.  He did report some back pain with subsequent passage a possible kidney stone and hematuria.  He subsequently seen by Urology who recommended cystoscopy, but the patient has declined.    His implantable loop recorder was last interrogated 11/24/2024.  I reviewed this interrogation which did not reveal any atrial arrhythmia.    CARDIOVASCULAR HISTORY:   ?L car atherosclerosis vs tortuous vessel (CUS 5/2023)    Hx R eye amarurosis    ILR 10/2023    PAST MEDICAL HISTORY:     Past Medical History:   Diagnosis Date    Elevated cholesterol     Hypertension     Squamous cell carcinoma     under right eye.  Sees outside Dermatology    Trigger ring finger of right hand 1/30/2019       PAST SURGICAL HISTORY:     Past Surgical History:   Procedure Laterality Date    COLONOSCOPY N/A 9/22/2020    Procedure: COLONOSCOPY;  Surgeon: Manuela Ward MD;  Location: [facility] ENDO;  Service: Endoscopy;  Laterality: N/A;    ECHOCARDIOGRAM,TRANSESOPHAGEAL N/A 8/14/2023    Procedure: Transesophageal echo (TRISH) intra-procedure log documentation;  Surgeon: Luis Antonio Small MD;  Location: [facility] CATH LAB;  Service: Cardiology;  Laterality: N/A;  with bubble study  RN PREOP 8/10/2023    EYE SURGERY      INSERTION OF IMPLANTABLE LOOP RECORDER N/A 10/4/2023    Procedure: Insertion, Implantable Loop Recorder;  Surgeon: Alis Bronson MD;  Location: [facility] CATH LAB;  Service: Cardiology;  Laterality: N/A;  RN PREOP  9/29/2023---INCOMPLETE CONSENT---md notified--pt    TRIGGER FINGER RELEASE Left 3/21/2023    Procedure: RELEASE, TRIGGER FINGER;  Surgeon: Gaby Martinez MD;  Location: [facility];  Service: Orthopedics;  Laterality: Left;  RN PREOP 03/14/2023---NEED H/P    VASECTOMY         ALLERGIES AND MEDICATION:     Review of patient's allergies indicates:   Allergen Reactions    Wasp venom Itching and Swelling    Sulfa (sulfonamide antibiotics) Other (See Comments)     Patient unsure, toddler        Medication List            Accurate as of December 9, 2024 10:22 AM. If you have any questions, ask your nurse or doctor.                CONTINUE taking these medications      aspirin 81 MG EC tablet  Commonly known as: ECOTRIN  Take 1 tablet (81 mg total) by mouth once daily.     atorvastatin 40 MG tablet  Commonly known as: LIPITOR  Take 1 tablet (40 mg total) by mouth once daily.     cetirizine 10 MG tablet  Commonly known as: ZYRTEC     clindamycin 1 % external solution  Commonly known as: CLEOCIN T     lisinopriL-hydrochlorothiazide 10-12.5 mg per tablet  Commonly known as: PRINZIDE,ZESTORETIC  Take 1 tablet by mouth once daily.     meloxicam 7.5 MG tablet  Commonly known as: MOBIC  TAKE 1 TABLET(7.5 MG) BY MOUTH EVERY DAY     mometasone 0.1% 0.1 % cream  Commonly known as: ELOCON  Apply topically daily as needed (rash).     mupirocin 2 % ointment  Commonly known as: BACTROBAN  Apply topically 3 (three) times daily. To forehead     tamsulosin 0.4 mg Cap  Commonly known as: FLOMAX  Take 1 capsule (0.4 mg total) by mouth once daily.            STOP taking these medications      fluticasone propionate 50 mcg/actuation nasal spray  Commonly known as: FLONASE  Stopped by: Luis Antonio Small MD              SOCIAL HISTORY:     Social History     Socioeconomic History    Marital status:    Occupational History    Occupation: Retired   Tobacco Use    Smoking status: Never    Smokeless tobacco: Never   Substance and Sexual  Activity    Alcohol use: Yes     Comment: Rarely    Drug use: No    Sexual activity: Yes     Partners: Female     Comment:      Social Drivers of Health     Financial Resource Strain: Low Risk  (8/26/2024)    Received from [facility]    Overall Financial Resource Strain (CARDIA)     Difficulty of Paying Living Expenses: Not hard at all   Food Insecurity: No Food Insecurity (8/26/2024)    Received from [facility]    Hunger Vital Sign     Worried About Running Out of Food in the Last Year: Never true     Ran Out of Food in the Last Year: Never true   Transportation Needs: No Transportation Needs (8/26/2024)    Received from [facility]    PRAPARE - Transportation     Lack of Transportation (Medical): No     Lack of Transportation (Non-Medical): No   Physical Activity: Sufficiently Active (8/26/2024)    Received from [facility]    Exercise Vital Sign     Days of Exercise per Week: 5 days     Minutes of Exercise per Session: 40 min   Stress: No Stress Concern Present (8/26/2024)    Received from [facility]    [employer] - Occupational Stress Questionnaire     Feeling of Stress : Not at all   Housing Stability: Low Risk  (12/14/2023)    Housing Stability Vital Sign     Unable to Pay for Housing in the Last Year: No     Number of Places Lived in the Last Year: 1     Unstable Housing in the Last Year: No       FAMILY HISTORY:     Family History   Problem Relation Name Age of Onset    Hypertension Mother      Cancer Father          lung    Diabetes Sister      Asthma Daughter Yaima     No Known Problems Daughter Emelia        REVIEW OF SYSTEMS:   Review of Systems   Constitutional:  Negative for chills, diaphoresis and fever.   HENT:  Negative for nosebleeds.    Eyes:  Negative for blurred vision, double vision and photophobia.        Brief R eye lower field visual loss   Respiratory:  Negative for hemoptysis, shortness of breath and wheezing.    Cardiovascular:  Negative for chest  "pain, palpitations, orthopnea, claudication, leg swelling and PND.   Gastrointestinal:  Negative for abdominal pain, blood in stool, heartburn, melena, nausea and vomiting.   Genitourinary:  Negative for flank pain and hematuria.   Musculoskeletal:  Negative for falls, myalgias and neck pain.   Skin:  Negative for rash.   Neurological:  Negative for dizziness, seizures, loss of consciousness, weakness and headaches.   Endo/Heme/Allergies:  Negative for polydipsia. Does not bruise/bleed easily.   Psychiatric/Behavioral:  Negative for depression and memory loss. The patient is not nervous/anxious.        PHYSICAL EXAM:     Vitals:    12/09/24 1009   BP: 110/62   Pulse: (!) 57   Resp: 18    Body mass index is 26.9 kg/m².  Weight: 77.9 kg (171 lb 11.8 oz)   Height: 5' 7" (170.2 cm)     Physical Exam  Vitals reviewed.   Constitutional:       General: He is not in acute distress.     Appearance: Normal appearance. He is well-developed and normal weight. He is not ill-appearing, toxic-appearing or diaphoretic.   HENT:      Head: Normocephalic and atraumatic.   Eyes:      General: No scleral icterus.     Extraocular Movements: Extraocular movements intact.      Conjunctiva/sclera: Conjunctivae normal.      Pupils: Pupils are equal, round, and reactive to light.   Neck:      Thyroid: No thyromegaly.      Vascular: Normal carotid pulses. Carotid bruit present. No JVD.      Trachea: Trachea normal.   Cardiovascular:      Rate and Rhythm: Regular rhythm. Bradycardia present.      Pulses:           Carotid pulses are 2+ on the right side and 2+ on the left side with bruit.     Heart sounds: S1 normal and S2 normal. Murmur heard.      Systolic murmur is present with a grade of 1/6.      No friction rub. No gallop.   Pulmonary:      Effort: Pulmonary effort is normal. No respiratory distress.      Breath sounds: Normal breath sounds. No stridor. No wheezing, rhonchi or rales.   Chest:      Chest wall: No tenderness.   Abdominal: "      General: There is no distension.      Palpations: Abdomen is soft.   Musculoskeletal:         General: No swelling or tenderness. Normal range of motion.      Cervical back: Normal range of motion and neck supple. No edema or rigidity.      Right lower leg: No edema.      Left lower leg: No edema.   Feet:      Right foot:      Skin integrity: No ulcer.      Left foot:      Skin integrity: No ulcer.   Skin:     General: Skin is warm and dry.      Coloration: Skin is not jaundiced.   Neurological:      General: No focal deficit present.      Mental Status: He is alert and oriented to person, place, and time.      Cranial Nerves: No cranial nerve deficit.   Psychiatric:         Mood and Affect: Mood normal.         Speech: Speech normal.         Behavior: Behavior normal. Behavior is cooperative.         DATA:   EKG: (personally reviewed tracing(s))  12/9/24 SR 54, PAC, similar to 9/13/23    Laboratory:  CBC:  Recent Labs   Lab 09/29/23  1325 07/29/24  0845 11/20/24  0710   WBC 8.43 8.55 8.82   Hemoglobin 13.2 L 13.3 L 13.8 L   Hematocrit 39.3 L 40.6 43.6   Platelets 338 289 290       CHEMISTRIES:  Recent Labs   Lab 07/29/22  0821 03/14/23  0910 07/26/23  0809 09/29/23  1325 07/29/24  0845 11/20/24  0710   Glucose 92 96 88 143 H 97 90   Sodium 140 141 141 139 140 141   Potassium 4.1 3.4 L 4.2 3.7 3.8 4.0   BUN 16 20 21 17 17 21   Creatinine 1.0 1.1 1.1 0.9 1.0 1.0   eGFR if non  >60.0  --   --   --   --   --    eGFR  --  >60 >60.0 >60 >60.0 >60.0   Calcium 9.1 8.8 9.1 8.9 9.3 9.2       CARDIAC BIOMARKERS:        COAGS:  Recent Labs   Lab 04/25/23  1518 08/10/23  1550   INR 1.0 1.0       LIPIDS/LFTS:  Recent Labs   Lab 07/29/22  0821 03/14/23  0910 07/26/23  0809 07/29/24  0845   Cholesterol 171  --  109 L 113 L   Triglycerides 97  --  52 74   HDL 54  --  50 50   LDL Cholesterol 97.6  --  48.6 L 48.2 L   Non-HDL Cholesterol 117  --  59 63   AST 17 14 18 16   ALT 25 17 29 30       Cardiovascular  Testing:  Carotid US 12/6/23    There is 0-19% right Internal Carotid Stenosis.    There is 0-19% left Internal Carotid Stenosis.    Compared with prior report dated 5/18/23, L ICA atherosclerosis no longer noted.    TRISH 8/14/23  Normal biventricular size/fxn  LVEF 60%  Normal wall motion  Normal LA/RA  No LA/JOVITA thrombus  Normal valves/doppler exam (trace MR/TR/AI)  Negative saline contrast study, no evidence of PFO/ASD.  Plan:  Cont med rx  Consider outpatient EVM/ILR    Echo 7/20/23  The left ventricle is normal in size with normal systolic function.  The estimated ejection fraction is 55%.  Normal right ventricular size with normal right ventricular systolic function.  Mild mitral regurgitation.  The estimated PA systolic pressure is 34 mmHg.  Saline contrast study notable for bubbles in left heart chambers after 10 cardiac cycles suggesting transpulmonary shunt.  No intracardiac source of embolus noted on this exam. If high clinical suspicion, consider TRISH +/- bubble study.    Aortic US 7/20/23  Suprarenal aortic ectasia without evidence of AAA, maximum diameter 2.7 cm.    Aortic atherosclerosis.    Holter 7/20/23  NSR. Heart rates varied between 39 and 92 BPM with an average of 55 BPM.  There were rare PVCs totalling 404 and averaging 8.42 per hour. There were 2 couplets  There were very frequent PACs totalling 6207 and averaging 129.31 per hour    Ex stress echo 8/20/18  The patient exercised for 10.5 minutes, corresponding to a functional capacity of 12 estimated METS, achieving a peak heart rate of 142 bpm, which is 97% of the age predicted maximum heart rate.   There were no significant electrocardiographic changes throughout the protocol suggesting ischemia.   EKG Conclusions:   1. The EKG portion of this study is negative for ischemia at a high workload, and peak heart rate of 142 bpm (97% of predicted).   2. Blood pressure response to exercise was normal (Presenting BP: 139/65 Peak BP: 187/82).   3. No  significant arrhythmias were present.   4. There were no symptoms of chest discomfort or significant dyspnea throughout the protocol.   Post Exercise Imaging:   Immediate post exercise images demonstrate left ventricular function augmenting.   No exercise induced wall motion abnormalities were identified.   CONCLUSIONS     1 - Normal left ventricular systolic function (EF 55-60%).     2 - Concentric remodeling.     3 - Mild left atrial enlargement.   No evidence of stress induced myocardial ischemia.       ASSESSMENT:   # ?amaurosis R eye, seems atypical.  Neg optho exam.  Sxs abated.  TRISH 8/2023 neg.  S/p ILR 10/4/23.  No AF detected.  # HTN, controlled  # HLP on atorva 40mg, LDL acceptable  # ?L carotid atherosclerosis (CUS 5/2023 with ?tortuosity), L carotid bruit.  No significant stenosis on CUS 12/2023.  # aortic atherosclerosis (aortic US 7/20/23)    PLAN:   Cont med rx  Cont ASA 81mg qd  RTC 1 year (Dec 2025), sooner prn.  Continue [email or web address] monitoring.    The above documents medical care services that are part of ongoing care related to this patient's serious/complex condition (Code ). (HTN/HLP)        Luis Antonio Small MD, FACC

## 2024-12-25 ENCOUNTER — HOSPITAL ENCOUNTER (OUTPATIENT)
Dept: CARDIOLOGY | Facility: HOSPITAL | Age: 79
Discharge: HOME | End: 2024-12-25
Attending: INTERNAL MEDICINE
Payer: MEDICARE

## 2024-12-25 ENCOUNTER — CLINICAL SUPPORT (OUTPATIENT)
Dept: CARDIOLOGY | Facility: HOSPITAL | Age: 79
End: 2024-12-25

## 2024-12-25 DIAGNOSIS — I49.8 OTHER SPECIFIED CARDIAC ARRHYTHMIAS: ICD-10-CM

## 2024-12-25 PROCEDURE — 93298 REM INTERROG DEV EVAL SCRMS: CPT | Mod: 26,,, | Performed by: INTERNAL MEDICINE

## 2024-12-25 PROCEDURE — 93298 REM INTERROG DEV EVAL SCRMS: CPT | Performed by: INTERNAL MEDICINE

## 2025-01-25 ENCOUNTER — HOSPITAL ENCOUNTER (OUTPATIENT)
Dept: CARDIOLOGY | Facility: HOSPITAL | Age: 80
Discharge: HOME | End: 2025-01-25
Attending: INTERNAL MEDICINE
Payer: MEDICARE

## 2025-01-25 ENCOUNTER — CLINICAL SUPPORT (OUTPATIENT)
Dept: CARDIOLOGY | Facility: HOSPITAL | Age: 80
End: 2025-01-25
Payer: MEDICARE

## 2025-01-25 DIAGNOSIS — I49.8 OTHER SPECIFIED CARDIAC ARRHYTHMIAS: ICD-10-CM

## 2025-01-25 PROCEDURE — 93298 REM INTERROG DEV EVAL SCRMS: CPT | Mod: 26,,, | Performed by: INTERNAL MEDICINE

## 2025-01-25 PROCEDURE — 93298 REM INTERROG DEV EVAL SCRMS: CPT | Performed by: INTERNAL MEDICINE

## 2025-02-13 LAB
OHS CV AF BURDEN PERCENT: < 1
OHS CV AF BURDEN PERCENT: < 1
OHS CV DC REMOTE DEVICE TYPE: NORMAL
OHS CV DC REMOTE DEVICE TYPE: NORMAL

## 2025-02-24 DIAGNOSIS — Z00.00 ENCOUNTER FOR MEDICARE ANNUAL WELLNESS EXAM: ICD-10-CM

## 2025-02-25 ENCOUNTER — CLINICAL SUPPORT (OUTPATIENT)
Dept: CARDIOLOGY | Facility: HOSPITAL | Age: 80
End: 2025-02-25
Payer: MEDICARE

## 2025-02-25 ENCOUNTER — HOSPITAL ENCOUNTER (OUTPATIENT)
Dept: CARDIOLOGY | Facility: HOSPITAL | Age: 80
Discharge: HOME | End: 2025-02-25
Attending: INTERNAL MEDICINE
Payer: MEDICARE

## 2025-02-25 DIAGNOSIS — I49.8 OTHER SPECIFIED CARDIAC ARRHYTHMIAS: ICD-10-CM

## 2025-02-25 PROCEDURE — 93298 REM INTERROG DEV EVAL SCRMS: CPT | Mod: 26,,, | Performed by: INTERNAL MEDICINE

## 2025-02-25 PROCEDURE — 93298 REM INTERROG DEV EVAL SCRMS: CPT | Performed by: INTERNAL MEDICINE

## 2025-03-13 LAB
OHS CV AF BURDEN PERCENT: < 1
OHS CV DC REMOTE DEVICE TYPE: NORMAL

## 2025-03-22 ENCOUNTER — PATIENT MESSAGE (OUTPATIENT)
Dept: ADMINISTRATIVE | Facility: OTHER | Age: 80
End: 2025-03-22
Payer: MEDICARE

## 2025-03-28 ENCOUNTER — HOSPITAL ENCOUNTER (OUTPATIENT)
Dept: CARDIOLOGY | Facility: HOSPITAL | Age: 80
Discharge: HOME | End: 2025-03-28
Attending: INTERNAL MEDICINE
Payer: MEDICARE

## 2025-03-28 ENCOUNTER — CLINICAL SUPPORT (OUTPATIENT)
Dept: CARDIOLOGY | Facility: HOSPITAL | Age: 80
End: 2025-03-28

## 2025-03-28 DIAGNOSIS — I49.8 OTHER SPECIFIED CARDIAC ARRHYTHMIAS: ICD-10-CM

## 2025-03-28 PROCEDURE — 93298 REM INTERROG DEV EVAL SCRMS: CPT | Performed by: INTERNAL MEDICINE

## 2025-03-28 PROCEDURE — 93298 REM INTERROG DEV EVAL SCRMS: CPT | Mod: 26,,, | Performed by: INTERNAL MEDICINE

## 2025-04-15 ENCOUNTER — TELEPHONE (OUTPATIENT)
Dept: CARDIOLOGY | Facility: HOSPITAL | Age: 80
End: 2025-04-15
Payer: MEDICARE

## 2025-04-15 NOTE — TELEPHONE ENCOUNTER
Alert transmission received from [employer] home monitor for AF. Transmission reveals 3 AF episodes on 4/15/25 between 5:23 AM and 6:33 AM. Longest episode is 1 hr 12 mins. Pt does not have a hx of AF and is not on an OAC. Called and s/w pt. Pt denies any symptoms of palpitations, fatigue, or shortness of breath. Pt states he worked out today and feels fine. Egrams demonstrate an irregular rhythm with occasional clear P waves but not consistent. Unable to r/o AF. Strips placed below for review. Will continue to monitor for more episodes.

## 2025-04-20 ENCOUNTER — PATIENT MESSAGE (OUTPATIENT)
Dept: CARDIOLOGY | Facility: CLINIC | Age: 80
End: 2025-04-20
Payer: MEDICARE

## 2025-04-22 ENCOUNTER — PATIENT MESSAGE (OUTPATIENT)
Dept: INTERNAL MEDICINE | Facility: CLINIC | Age: 80
End: 2025-04-22
Payer: MEDICARE

## 2025-04-22 ENCOUNTER — TELEPHONE (OUTPATIENT)
Dept: INTERNAL MEDICINE | Facility: CLINIC | Age: 80
End: 2025-04-22
Payer: MEDICARE

## 2025-04-28 ENCOUNTER — CLINICAL SUPPORT (OUTPATIENT)
Dept: CARDIOLOGY | Facility: HOSPITAL | Age: 80
End: 2025-04-28
Payer: MEDICARE

## 2025-04-28 ENCOUNTER — HOSPITAL ENCOUNTER (OUTPATIENT)
Dept: CARDIOLOGY | Facility: HOSPITAL | Age: 80
Discharge: HOME | End: 2025-04-28
Attending: INTERNAL MEDICINE
Payer: MEDICARE

## 2025-04-28 DIAGNOSIS — I49.8 OTHER SPECIFIED CARDIAC ARRHYTHMIAS: ICD-10-CM

## 2025-04-28 PROCEDURE — 93298 REM INTERROG DEV EVAL SCRMS: CPT | Mod: 26,,, | Performed by: INTERNAL MEDICINE

## 2025-04-28 PROCEDURE — 93298 REM INTERROG DEV EVAL SCRMS: CPT | Performed by: INTERNAL MEDICINE

## 2025-05-06 ENCOUNTER — PATIENT MESSAGE (OUTPATIENT)
Dept: CARDIOLOGY | Facility: CLINIC | Age: 80
End: 2025-05-06
Payer: MEDICARE

## 2025-05-29 ENCOUNTER — CLINICAL SUPPORT (OUTPATIENT)
Dept: CARDIOLOGY | Facility: HOSPITAL | Age: 80
End: 2025-05-29
Payer: MEDICARE

## 2025-05-29 ENCOUNTER — HOSPITAL ENCOUNTER (OUTPATIENT)
Dept: CARDIOLOGY | Facility: HOSPITAL | Age: 80
Discharge: HOME | End: 2025-05-29
Attending: INTERNAL MEDICINE
Payer: MEDICARE

## 2025-05-29 DIAGNOSIS — I49.8 OTHER SPECIFIED CARDIAC ARRHYTHMIAS: ICD-10-CM

## 2025-05-29 PROCEDURE — 93298 REM INTERROG DEV EVAL SCRMS: CPT | Performed by: INTERNAL MEDICINE

## 2025-05-29 PROCEDURE — 93298 REM INTERROG DEV EVAL SCRMS: CPT | Mod: 26,,, | Performed by: INTERNAL MEDICINE

## 2025-06-15 ENCOUNTER — PATIENT MESSAGE (OUTPATIENT)
Dept: INTERNAL MEDICINE | Facility: CLINIC | Age: 80
End: 2025-06-15
Payer: MEDICARE

## 2025-06-20 LAB
OHS CV AF BURDEN PERCENT: < 1
OHS CV DC REMOTE DEVICE TYPE: NORMAL

## 2025-06-29 ENCOUNTER — HOSPITAL ENCOUNTER (OUTPATIENT)
Dept: CARDIOLOGY | Facility: HOSPITAL | Age: 80
Discharge: HOME | End: 2025-06-29
Attending: INTERNAL MEDICINE
Payer: MEDICARE

## 2025-06-29 ENCOUNTER — CLINICAL SUPPORT (OUTPATIENT)
Dept: CARDIOLOGY | Facility: HOSPITAL | Age: 80
End: 2025-06-29

## 2025-06-29 DIAGNOSIS — I49.8 OTHER SPECIFIED CARDIAC ARRHYTHMIAS: ICD-10-CM

## 2025-06-29 PROCEDURE — 93298 REM INTERROG DEV EVAL SCRMS: CPT | Performed by: INTERNAL MEDICINE

## 2025-06-29 PROCEDURE — 93298 REM INTERROG DEV EVAL SCRMS: CPT | Mod: 26,,, | Performed by: INTERNAL MEDICINE

## 2025-07-08 ENCOUNTER — PATIENT MESSAGE (OUTPATIENT)
Dept: INTERNAL MEDICINE | Facility: CLINIC | Age: 80
End: 2025-07-08
Payer: MEDICARE

## 2025-07-08 ENCOUNTER — E-VISIT (OUTPATIENT)
Dept: INTERNAL MEDICINE | Facility: CLINIC | Age: 80
End: 2025-07-08
Payer: MEDICARE

## 2025-07-08 DIAGNOSIS — J06.9 UPPER RESPIRATORY TRACT INFECTION, UNSPECIFIED TYPE: Primary | ICD-10-CM

## 2025-07-08 DIAGNOSIS — R05.2 SUBACUTE COUGH: ICD-10-CM

## 2025-07-09 ENCOUNTER — TELEPHONE (OUTPATIENT)
Dept: INTERNAL MEDICINE | Facility: CLINIC | Age: 80
End: 2025-07-09
Payer: MEDICARE

## 2025-07-09 RX ORDER — BENZONATATE 200 MG/1
200 CAPSULE ORAL 3 TIMES DAILY PRN
Qty: 21 CAPSULE | Refills: 0 | Status: SHIPPED | OUTPATIENT
Start: 2025-07-09 | End: 2025-07-16

## 2025-07-09 NOTE — PROGRESS NOTES
Patient ID: Johnathan Waters is a 80 y.o. male.        E-Visit Time Tracking:             Chief Complaint: General Illness (Entered automatically based on patient selection in [facility].)      The patient initiated a request through [facility] on 7/8/2025 for evaluation and management with a chief complaint of General Illness (Entered automatically based on patient selection in [facility].)     I evaluated the questionnaire submission on 07/09/2025.    [facility] Evisit [facility]-Cough And Cold    7/8/2025  5:50 PM CDT - Filed by Patient   What do you need help with? Cough, Cold, Sore Throat   Do you agree to participate in an E-Visit? Yes   If you have any of the following symptoms, go to your local emergency room or call 911: I acknowledge   What is the main issue you would like addressed today? RTI fatigue cough stuffy nose   Do you think you might have COVID-19 or the Flu? (COVID-19, Flu, No, Not sure) Not sure   What symptoms do you currently have?(Chills, Cough, Diarrhea, Fatigue, Headache, Stuffy nose, Loss of taste or smell, Muscle or body aches, Nausea, Runny nose, Sore throat, Face and nose pain, Ear pain, Neck pain, None) Cough;  Fatigue;  Headache;  Stuffy nose;  Runny nose   Describe your cough:(Contains mucus, Comes and goes, Dry, Does not stop, Interferes with daily activities ) Contains mucus;  Comes and goes   Describe your mucus:(Bloody, Green, Yellow, Clear, White, Foamy, Thick, Thin, Foul smelling) Clear   Have you ever smoked?(Smoked in the past, Currently smoke, Never smoked) Never smoked   What has been the range of your fever?(Below 100.4, 100.4 or higher, Not sure) 100.4° or higher   When did your concern begin? 4/7/2025   In the last two weeks, have you been in close contact with someone who has COVID-19, the Flu, or strep throat? Not sure   What have you tried to help your symptoms? Cough syrup;  Rest and sleep   On a scale of 1-10, where 10 is the worst you can imagine, how severe are your  symptoms? (range: 1 - 10) 5   Have your symptoms changed since they first started?(Improved, Worsened, No change) No change   Do you have transportation to an [facility] location to get tested for COVID-19? Yes   Provide any additional information you feel is important.    Please attach any relevant images or files    Are you able to take your vitals? Yes   Systolic Blood Pressure 143   Diastolic Blood Pressure 69   Weight: 162   Height: 67   Pluse: 66   Temp 100.1   Resp: 20   SpO2 92       Encounter Diagnoses   Name Primary?    Subacute cough     Upper respiratory tract infection, unspecified type Yes        No orders of the defined types were placed in this encounter.     Medications Ordered This Encounter   Medications    benzonatate (TESSALON) 200 MG capsule     Sig: Take 1 capsule (200 mg total) by mouth 3 (three) times daily as needed for Cough.     Dispense:  21 capsule     Refill:  0        Follow up if symptoms worsen or fail to improve.

## 2025-07-09 NOTE — PATIENT INSTRUCTIONS
"Patient Education     Upper respiratory infection in adults - Discharge instructions   The Basics   Written by the doctors and editors at [facility]   What are discharge instructions? --   Discharge instructions are information about how to take care of yourself after getting medical care for a health problem.  What is an upper respiratory infection? --   An upper respiratory infection ("URI") is an illness that can affect your nose, throat, ears, and sinuses. Almost all URIs are caused by a virus. The common cold is an example of a viral URI. Some URIs are caused by bacteria, but this is much less common.  URIs spread easily from person to person, most often through coughing or sneezing. A URI will almost always get better in a week or 2 without any treatment. Because most URIs are caused by viruses, antibiotics do not usually help.  If you do have a bacterial infection, your doctor might prescribe antibiotics.  How do I care for myself at home? --   Ask the doctor or nurse what you should do when you go home. Make sure that you understand exactly what you need to do to care for yourself. Ask questions if there is anything you do not understand.  You should also:   Wash your hands often (figure 1), and cough or sneeze into a tissue. If you do not have a tissue, cough or sneeze into your elbow instead of your hands.   Drink lots of fluids (water, juice, or broth) to stay hydrated, unless your doctor told you otherwise. This will help replace any fluids lost through runny nose or fever. Warm tea or soup can also help soothe a sore throat.   To help a stuffy nose and make it easier to breathe:   Use saline nose drops or spray.   Use a humidifier if the air in your home feels dry.   Follow the directions on the label carefully if you take over-the-counter cough or cold medicines. Do not take more than 1 medicine that contains acetaminophen. Also, if you have a heart problem or high blood pressure, check with your doctor " before you take any of these medicines.   Try to quit smoking if you smoke. Your doctor or nurse can help.  How can I prevent getting another URI? --   The best way to prevent a URI, or keep it from spreading to others, is to keep your hands clean. Wash your hands often with soap and water or alcohol gel rubs.  Some other ways to prevent the spread of infection include:   Always wash your hands with soap and water after you cough, sneeze, or blow your nose.   Clean surfaces and objects that you touch a lot. These include sinks, counters, tables, door handles, remotes, and phones. Use a bleach and water mixture. The germs that cause a URI can live on surfaces for at least 2 hours.   Do not share cups, food, towels, bed linens, or other personal items.   Stay away from other people when you are sick. When you do need to be around other people, consider wearing a face mask.  When should I call the doctor? --   Call for advice if:   You have a persistent fever of 100.4°F (38°C) or higher, chills, a very bad sore throat, or ear or sinus pain.   You get a new fever after several days of feeling the same or getting better.   You start having chest pain when you cough.   You have a cough that lasts more than 10 days.   You cough up blood.   You have any new or worsening symptoms, such as worsening cough or trouble breathing.  All topics are updated as new evidence becomes available and our peer review process is complete.  This topic retrieved from [employer] on: Jul 13, 2024.  Topic 512439 Version 2.0  Release: 32.6.2 - C32.193  © 2024 UpToDate, Inc. and/or its affiliates. All rights reserved.  figure 1: How to wash your hands     Wet your hands with clean water, and apply a small amount of soap. Lather and rub hands together for at least 20 seconds. Clean your wrists, palms, backs of your hands, between your fingers, tips of your fingers, thumbs, and under and around your nails. Rinse well, and dry your hands using a clean  steven.  Graphic 804973 Version 7.0  Consumer Information Use and Disclaimer   Disclaimer: This generalized information is a limited summary of diagnosis, treatment, and/or medication information. It is not meant to be comprehensive and should be used as a tool to help the user understand and/or assess potential diagnostic and treatment options. It does NOT include all information about conditions, treatments, medications, side effects, or risks that may apply to a specific patient. It is not intended to be medical advice or a substitute for the medical advice, diagnosis, or treatment of a health care provider based on the health care provider's examination and assessment of a patient's specific and unique circumstances. Patients must speak with a health care provider for complete information about their health, medical questions, and treatment options, including any risks or benefits regarding use of medications. This information does not endorse any treatments or medications as safe, effective, or approved for treating a specific patient. UpToDate, Inc. and its affiliates disclaim any warranty or liability relating to this information or the use thereof.The use of this information is governed by the Terms of Use, available at https://www.[employer] Mediauwer.com/en/know/clinical-effectiveness-terms. 2024© UpToDate, Inc. and its affiliates and/or licensors. All rights reserved.  Copyright   © 2024 UpToDate, Inc. and/or its affiliates. All rights reserved.

## 2025-07-19 ENCOUNTER — LAB VISIT (OUTPATIENT)
Dept: LAB | Facility: HOSPITAL | Age: 80
End: 2025-07-19
Attending: INTERNAL MEDICINE
Payer: MEDICARE

## 2025-07-19 DIAGNOSIS — I10 ESSENTIAL HYPERTENSION: Chronic | ICD-10-CM

## 2025-07-19 DIAGNOSIS — N40.1 BENIGN NON-NODULAR PROSTATIC HYPERPLASIA WITH LOWER URINARY TRACT SYMPTOMS: ICD-10-CM

## 2025-07-19 DIAGNOSIS — R31.9 HEMATURIA, UNSPECIFIED TYPE: ICD-10-CM

## 2025-07-19 LAB
ABSOLUTE EOSINOPHIL (OHS): 0.24 K/UL
ABSOLUTE MONOCYTE (OHS): 0.99 K/UL (ref 0.3–1)
ABSOLUTE NEUTROPHIL COUNT (OHS): 5.79 K/UL (ref 1.8–7.7)
ALBUMIN SERPL BCP-MCNC: 3.6 G/DL (ref 3.5–5.2)
ALP SERPL-CCNC: 58 UNIT/L (ref 40–150)
ALT SERPL W/O P-5'-P-CCNC: 37 UNIT/L (ref 10–44)
ANION GAP (OHS): 10 MMOL/L (ref 8–16)
AST SERPL-CCNC: 17 UNIT/L (ref 11–45)
BASOPHILS # BLD AUTO: 0.06 K/UL
BASOPHILS NFR BLD AUTO: 0.6 %
BILIRUB SERPL-MCNC: 0.7 MG/DL (ref 0.1–1)
BUN SERPL-MCNC: 25 MG/DL (ref 8–23)
CALCIUM SERPL-MCNC: 8.8 MG/DL (ref 8.7–10.5)
CHLORIDE SERPL-SCNC: 104 MMOL/L (ref 95–110)
CHOLEST SERPL-MCNC: 123 MG/DL (ref 120–199)
CHOLEST/HDLC SERPL: 2.4 {RATIO} (ref 2–5)
CO2 SERPL-SCNC: 27 MMOL/L (ref 23–29)
CREAT SERPL-MCNC: 1.1 MG/DL (ref 0.5–1.4)
ERYTHROCYTE [DISTWIDTH] IN BLOOD BY AUTOMATED COUNT: 13.3 % (ref 11.5–14.5)
GFR SERPLBLD CREATININE-BSD FMLA CKD-EPI: >60 ML/MIN/1.73/M2
GLUCOSE SERPL-MCNC: 101 MG/DL (ref 70–110)
HCT VFR BLD AUTO: 40.6 % (ref 40–54)
HDLC SERPL-MCNC: 52 MG/DL (ref 40–75)
HDLC SERPL: 42.3 % (ref 20–50)
HGB BLD-MCNC: 13.6 GM/DL (ref 14–18)
IMM GRANULOCYTES # BLD AUTO: 0.04 K/UL (ref 0–0.04)
IMM GRANULOCYTES NFR BLD AUTO: 0.4 % (ref 0–0.5)
LDLC SERPL CALC-MCNC: 61.6 MG/DL (ref 63–159)
LYMPHOCYTES # BLD AUTO: 2.3 K/UL (ref 1–4.8)
MCH RBC QN AUTO: 29.1 PG (ref 27–31)
MCHC RBC AUTO-ENTMCNC: 33.5 G/DL (ref 32–36)
MCV RBC AUTO: 87 FL (ref 82–98)
NONHDLC SERPL-MCNC: 71 MG/DL
NUCLEATED RBC (/100WBC) (OHS): 0 /100 WBC
PLATELET # BLD AUTO: 346 K/UL (ref 150–450)
PMV BLD AUTO: 10.9 FL (ref 9.2–12.9)
POTASSIUM SERPL-SCNC: 4 MMOL/L (ref 3.5–5.1)
PROT SERPL-MCNC: 7.1 GM/DL (ref 6–8.4)
RBC # BLD AUTO: 4.67 M/UL (ref 4.6–6.2)
RELATIVE EOSINOPHIL (OHS): 2.5 %
RELATIVE LYMPHOCYTE (OHS): 24.4 % (ref 18–48)
RELATIVE MONOCYTE (OHS): 10.5 % (ref 4–15)
RELATIVE NEUTROPHIL (OHS): 61.6 % (ref 38–73)
SODIUM SERPL-SCNC: 141 MMOL/L (ref 136–145)
TRIGL SERPL-MCNC: 47 MG/DL (ref 30–150)
WBC # BLD AUTO: 9.42 K/UL (ref 3.9–12.7)

## 2025-07-19 PROCEDURE — 80061 LIPID PANEL: CPT

## 2025-07-19 PROCEDURE — 85025 COMPLETE CBC W/AUTO DIFF WBC: CPT

## 2025-07-19 PROCEDURE — 36415 COLL VENOUS BLD VENIPUNCTURE: CPT

## 2025-07-19 PROCEDURE — 82040 ASSAY OF SERUM ALBUMIN: CPT

## 2025-07-19 NOTE — TELEPHONE ENCOUNTER
No care due was identified.  [facility] Embedded Care Due Messages. Reference number: [id number].   7/19/2025 9:42:37 AM CDT

## 2025-07-20 RX ORDER — TAMSULOSIN HYDROCHLORIDE 0.4 MG/1
CAPSULE ORAL
Qty: 90 CAPSULE | Refills: 0 | Status: SHIPPED | OUTPATIENT
Start: 2025-07-20

## 2025-07-20 NOTE — TELEPHONE ENCOUNTER
Refill Decision Note   Johnathan Waters  is requesting a refill authorization.  Brief Assessment and Rationale for Refill:  Approve     Medication Therapy Plan:        Comments:     Note composed:8:16 AM 07/20/2025

## 2025-07-30 ENCOUNTER — HOSPITAL ENCOUNTER (OUTPATIENT)
Dept: CARDIOLOGY | Facility: HOSPITAL | Age: 80
Discharge: HOME | End: 2025-07-30
Attending: INTERNAL MEDICINE
Payer: MEDICARE

## 2025-07-30 ENCOUNTER — CLINICAL SUPPORT (OUTPATIENT)
Dept: CARDIOLOGY | Facility: HOSPITAL | Age: 80
End: 2025-07-30
Payer: MEDICARE

## 2025-07-30 DIAGNOSIS — I49.8 OTHER SPECIFIED CARDIAC ARRHYTHMIAS: ICD-10-CM

## 2025-07-30 PROCEDURE — 93298 REM INTERROG DEV EVAL SCRMS: CPT | Mod: 26,,, | Performed by: INTERNAL MEDICINE

## 2025-07-30 PROCEDURE — 93298 REM INTERROG DEV EVAL SCRMS: CPT | Performed by: INTERNAL MEDICINE

## 2025-08-04 ENCOUNTER — OFFICE VISIT (OUTPATIENT)
Dept: INTERNAL MEDICINE | Facility: CLINIC | Age: 80
End: 2025-08-04
Payer: MEDICARE

## 2025-08-04 VITALS
HEART RATE: 60 BPM | OXYGEN SATURATION: 97 % | WEIGHT: 167.56 LBS | RESPIRATION RATE: 18 BRPM | HEIGHT: 66 IN | BODY MASS INDEX: 26.93 KG/M2 | TEMPERATURE: 98 F

## 2025-08-04 DIAGNOSIS — K40.20 NON-RECURRENT BILATERAL INGUINAL HERNIA WITHOUT OBSTRUCTION OR GANGRENE: Chronic | ICD-10-CM

## 2025-08-04 DIAGNOSIS — I70.0 AORTIC ATHEROSCLEROSIS: ICD-10-CM

## 2025-08-04 DIAGNOSIS — R91.1 SOLITARY PULMONARY NODULE: ICD-10-CM

## 2025-08-04 DIAGNOSIS — R91.1 PULMONARY NODULE LESS THAN 1 CM IN DIAMETER WITH LOW RISK FOR MALIGNANT NEOPLASM: ICD-10-CM

## 2025-08-04 DIAGNOSIS — Z91.89 PULMONARY NODULE LESS THAN 1 CM IN DIAMETER WITH LOW RISK FOR MALIGNANT NEOPLASM: ICD-10-CM

## 2025-08-04 DIAGNOSIS — K86.2 PANCREATIC CYST: ICD-10-CM

## 2025-08-04 DIAGNOSIS — E78.49 OTHER HYPERLIPIDEMIA: ICD-10-CM

## 2025-08-04 DIAGNOSIS — I10 ESSENTIAL HYPERTENSION: Primary | Chronic | ICD-10-CM

## 2025-08-04 PROCEDURE — 99214 OFFICE O/P EST MOD 30 MIN: CPT | Mod: PBBFAC | Performed by: INTERNAL MEDICINE

## 2025-08-04 PROCEDURE — 99999 PR PBB SHADOW E&M-EST. PATIENT-LVL IV: CPT | Mod: PBBFAC,,, | Performed by: INTERNAL MEDICINE

## 2025-08-04 PROCEDURE — 99215 OFFICE O/P EST HI 40 MIN: CPT | Mod: S$PBB,,, | Performed by: INTERNAL MEDICINE

## 2025-08-04 RX ORDER — LISINOPRIL 10 MG/1
10 TABLET ORAL DAILY
Qty: 90 TABLET | Refills: 3 | Status: SHIPPED | OUTPATIENT
Start: 2025-08-04 | End: 2026-08-04

## 2025-08-04 RX ORDER — ATORVASTATIN CALCIUM 80 MG/1
80 TABLET, FILM COATED ORAL DAILY
Qty: 90 TABLET | Refills: 3 | Status: SHIPPED | OUTPATIENT
Start: 2025-08-04

## 2025-08-04 NOTE — ASSESSMENT & PLAN NOTE
MRCP was recommended by radiology but he has a loop recorder in.  Will need to coordinate with them on the best next step

## 2025-08-04 NOTE — PROGRESS NOTES
Assessment & Plan  Assessment & Plan    IMPRESSION:  - Kidney stone from November: no current symptoms or concerns.  - Eye problem: no symptoms for 2-3 years.  - Tailbone/sacroiliac joint pain: recommended core strengthening exercises.  - Reviewed CT findings:  - Pulmonary nodule: Low risk, likely old infection. Recommend follow-up at 12 and 24 months due to non-smoker status.  - Pancreatic cysts: Incidental finding, recommended MRI for further evaluation.  - Cardiovascular risk: increased statin dose for better LDL management.  - BP control: discontinued HCTZ due to good control.  - Evaluated lab results:  - Slight dehydration, likely due to fasting and diuretic use.  - Stable, slightly low hemoglobin as likely age-appropriate.  - COVID-19 booster: advised based on limited public health data.  - Hernia: identified small hernia on right side, possibly bilateral. Surgical intervention unnecessary at this time.    PLAN SUMMARY:  - Order MRI Pancreas, pending compatibility confirmation with loop recorder  - Order CT Chest in November for pulmonary nodule follow-up  - Discontinue HCTZ component of current BP medication  - Continue lisinopril 10 mg daily  - Increase atorvastatin from 40 mg to 80 mg daily  - Recommend basic low back exercises for core and gluteal strengthening  - Mr. Waters to continue cycling routine 5 times per week  - Take two 40 mg tablets of current medication until supply runs out, then  new prescription  - No immediate surgical intervention for small hernia  - Follow-up in October for flu vaccine  - Follow-up at 12 and 24 months for pulmonary nodule evaluation    ## ESSENTIAL HYPERTENSION:  - Discontinued HCTZ component of current BP medication due to well-controlled blood pressure and age-related recommendations.  - Will continue lisinopril 10 mg daily.  - Blood pressure is very well controlled and on the lower side, occasionally causing dizziness when rising quickly.  - Mr. Waters instructed  to contact office if BP becomes elevated after discontinuing HCTZ.  - If this occurs, will consider increasing lisinopril from 10 to 20 mg.    ## HYPERLIPIDEMIA:  - LDL cholesterol increased from 48 last year to 61.6 this year.  - Mr. Waters tolerates current statin medication well without side effects.  - Increased atorvastatin from 40 mg to 80 mg daily for better LDL management and to maximize heart attack and stroke risk reduction.  - Mr. Waters instructed to take two 40 mg tablets until current supply runs out, then  the new prescription at [facility].    ## INGUINAL HERNIA:  - Mr. Waters experiences occasional pain on the right side, but no significant bulging or swelling.  - Examination revealed a small hernia on the right side only.  - Discussed possibility that hip pain could be causing groin pain in relation to the small hernia.  - No immediate surgical intervention recommended unless it becomes more painful or causes increased swelling.    ## LOW BACK PAIN:  - Mr. Waters experiences pain at the base of the spine when sitting for prolonged periods, whi  ch improves with posture changes and appropriate cushioning.  - Pain is likely related to posture and seating, possibly involving the sacroiliac joint or coccyx.  - Recommend basic low back exercises to strengthen core and gluteal area, specifically hip lift (engaging core muscles and squeezing gluteal muscles) and knee-to-chest stretches.    ## SOLITARY PULMONARY NODULE:  - Pulmonary nodule identified on CT is likely due to old infections, not suspected to be lung cancer based on size and description.  - Recommend follow-up at 12 and 24 months due to non-smoking status.  - Ordered CT Chest in November for follow-up evaluation of the pulmonary nodule with scheduled appointment.      ## HISTORY OF URINARY CALCULI:  - Mr. Waters had a probable kidney stone in November with symptoms of bleeding.  - No return of symptoms since the initial episode.  - CT was  "performed, and a cystoscopy was considered but deemed unnecessary by the patient due to symptom resolution.    ## PRESENCE OF CARDIAC IMPLANT:  - Mr. Waters has a loop recorder implanted in September 2023.  - Ordered MRI Pancreas to evaluate hypodensities, pending confirmation of compatibility with loop recorder.  - Mr. Waters to await further instructions regarding MRI scheduling after compatibility confirmation.    ## GENERAL HEALTH MANAGEMENT AND FOLLOW-UP:  - Discussed incidental findings ("incidentalomas") on imaging studies and their prevalence.  - Explained age-related changes in blood counts.  - Mr. Waters to continue cycling routine of about 5 times per week.  - Follow up in October for flu vaccine.         1. Essential hypertension  -     lisinopriL 10 MG tablet; Take 1 tablet (10 mg total) by mouth once daily.  Dispense: 90 tablet; Refill: 3  -     CBC Without Differential; Future; Expected date: 07/04/2026  -     Comprehensive Metabolic Panel; Future; Expected date: 07/04/2026  -     Lipid Panel; Future; Expected date: 07/04/2026    2. Aortic atherosclerosis  Overview:  Stable, asymptomatic chronic condition.  Will continue to maximize risk factor reduction and adjust medication as needed.       3. Other hyperlipidemia  -     atorvastatin (LIPITOR) 80 MG tablet; Take 1 tablet (80 mg total) by mouth once daily.  Dispense: 90 tablet; Refill: 3    4. Pancreatic cyst  Assessment & Plan:  MRCP was recommended by radiology but he has a loop recorder in.  Will need to coordinate with them on the best next step      5. Pulmonary nodule less than 1 cm in diameter with low risk for malignant neoplasm    6. Solitary pulmonary nodule  -     CT Chest Without Contrast; Future; Expected date: 11/01/2025    7. Non-recurrent bilateral inguinal hernia without obstruction or gangrene  Overview:  Bilateral fat containing.  More noticeable on exam on the R>L.  Monitor            Follow-up: Follow up in about 1 year (around " 8/4/2026).    This note was generated with the assistance of ambient listening technology. Verbal consent was obtained by the patient and accompanying visitor(s) for the recording of patient appointment to facilitate this note. I attest to having reviewed and edited the generated note for accuracy, though some syntax or spelling errors may persist. Please contact the author of this note for any clarification.      ______________________________________________________________________    Chief Complaint  Chief Complaint   Patient presents with    Medication Problem    Hypertension    Hyperlipidemia       History of Present Illness    CHIEF COMPLAINT:  Mr. Waters presents today for follow up of multiple concerns including kidney stone from November.    GENITOURINARY:  He reports a history of probable kidney stone in November associated with hematuria. CT was performed and he was evaluated by Dr. Harper. Cystoscopy was recommended but he declined as symptoms had resolved. He currently reports no return of symptoms and no ongoing urological concerns.    GROIN PAIN AND HERNIA:  He reports intermittent right-sided groin pain first noticed while pressure washing his driveway, described as mild discomfort and occasional awareness of pain. He denies significant swelling or bulging. The pain is occasionally uncomfortable but not severely limiting his activities. Examination revealed a small right-sided hernia which may be contributing to the groin discomfort.    MUSCULOSKELETAL:  He reports pain at the base of the spine that occurs with prolonged sitting. Pain intensity varies depending on seating position and seat type, with symptoms exacerbated by extended sitting particularly in less supportive seats such as airline seats. Maintaining an upright posture while seated helps improve the pain.    EXERCISE:  He continues cycling approximately 5 times per week and engages in core and gluteal strengthening exercises before and  after cycling, including hip lifts and knee-to-chest stretches.    IMAGING:  CT showed multiple hypodensities within the pancreatic body. Further evaluation WO Contrast was recommended for the pancreatic hypodensities. A pulmonary nodule was noted and discussed as likely related to an old infection and not concerning for malignancy. He is a non-smoker.    CARDIOVASCULAR:  He has a loop recorder implanted in September 2023.    OCULAR:  Regarding his eye problem, he reports no symptoms for the past 2-3 years. He continues to follow with Dr. Nelson with next follow-up scheduled in December.    DENTAL:  He underwent a bone graft procedure for a dental implant. He is currently missing a tooth with the graft site healing prior to implant placement. He is scheduled to return to his dental provider on Wednesday and is taking vitamin D3 5000 IU as recommended to support healing.    RECENT ILLNESS:  He reports recent respiratory problems and used an old COVID home test which returned positive. He is uncertain about the test's accuracy and states he may or may not have actually had COVID. If he did contract COVID, symptoms were mild.            The ASCVD Risk score (Laney DK, et al., 2019) failed to calculate for the following reasons:    The 2019 ASCVD risk score is only valid for ages 40 to 79    Risk score cannot be calculated because patient has a medical history suggesting prior/existing ASCVD     ROS  Review of Systems   Constitutional:  Negative for activity change and unexpected weight change.   HENT:  Positive for rhinorrhea. Negative for hearing loss and trouble swallowing.    Eyes:  Negative for discharge and visual disturbance.   Respiratory:  Negative for chest tightness and wheezing.    Cardiovascular:  Negative for chest pain and palpitations.   Gastrointestinal:  Negative for blood in stool, constipation, diarrhea and vomiting.   Endocrine: Negative for polydipsia and polyuria.   Genitourinary:  Negative for  "difficulty urinating, hematuria and urgency.   Musculoskeletal:  Negative for arthralgias, joint swelling and neck pain.   Neurological:  Negative for weakness and headaches.   Psychiatric/Behavioral:  Negative for confusion and dysphoric mood.            Physical Exam  Vitals:    08/04/25 0950   BP: (P) 108/72   Pulse: 60   Resp: 18   Temp: 98.2 °F (36.8 °C)   TempSrc: Oral   SpO2: 97%   Weight: 76 kg (167 lb 8.8 oz)   Height: 5' 6" (1.676 m)    Body mass index is 27.04 kg/m².  Weight: 76 kg (167 lb 8.8 oz)   Height: 5' 6" (167.6 cm)   Physical Exam  Constitutional:       General: He is not in acute distress.     Appearance: He is well-developed.   HENT:      Head: Normocephalic and atraumatic.   Eyes:      General: Lids are normal. No scleral icterus.     Conjunctiva/sclera: Conjunctivae normal.      Pupils: Pupils are equal, round, and reactive to light.   Neck:      Thyroid: No thyromegaly.      Vascular: No carotid bruit or JVD.   Cardiovascular:      Rate and Rhythm: Normal rate and regular rhythm.      Heart sounds: Murmur heard.      No friction rub. No S3 or S4 sounds.   Pulmonary:      Effort: Pulmonary effort is normal.      Breath sounds: Normal breath sounds. No wheezing, rhonchi or rales.   Abdominal:      General: Bowel sounds are normal. There is no distension.      Palpations: Abdomen is soft.      Tenderness: There is no guarding.      Hernia: A hernia is present. Hernia is present in the right inguinal area.   Genitourinary:     Comments: Chaperone declined by patient  Musculoskeletal:      Cervical back: Full passive range of motion without pain and neck supple.      Right lower leg: No edema.      Left lower leg: No edema.   Skin:     General: Skin is warm and dry.      Findings: No rash.   Neurological:      Mental Status: He is alert and oriented to person, place, and time.   Psychiatric:         Speech: Speech normal.         Behavior: Behavior normal.         Thought Content: Thought content " normal.

## 2025-08-07 ENCOUNTER — HOSPITAL ENCOUNTER (OUTPATIENT)
Dept: RADIOLOGY | Facility: HOSPITAL | Age: 80
Discharge: HOME | End: 2025-08-07
Attending: INTERNAL MEDICINE
Payer: MEDICARE

## 2025-08-07 DIAGNOSIS — R91.1 SOLITARY PULMONARY NODULE: ICD-10-CM

## 2025-08-07 PROCEDURE — 71250 CT THORAX DX C-: CPT | Mod: TC

## 2025-08-11 ENCOUNTER — RESULTS FOLLOW-UP (OUTPATIENT)
Dept: INTERNAL MEDICINE | Facility: CLINIC | Age: 80
End: 2025-08-11
Payer: MEDICARE

## 2025-08-11 DIAGNOSIS — R91.1 LUNG NODULE: Primary | ICD-10-CM

## 2025-08-11 PROBLEM — Z91.89 PULMONARY NODULE LESS THAN 1 CM IN DIAMETER WITH LOW RISK FOR MALIGNANT NEOPLASM: Status: RESOLVED | Noted: 2025-08-04 | Resolved: 2025-08-11

## (undated) DEVICE — SUT ETHILON 3/0 18IN PS-1

## (undated) DEVICE — NDL 18GA X1 1/2 REG BEVEL

## (undated) DEVICE — SOL IRR SOD CHL .9% POUR

## (undated) DEVICE — UNDERGLOVE BIOGEL PI SZ 6.5 LF

## (undated) DEVICE — SEE MEDLINE ITEM 157128

## (undated) DEVICE — FORCEP STRAIGHT DISP

## (undated) DEVICE — CORD FOR BIPOLAR FORCEPS 12

## (undated) DEVICE — TOURNIQUET SB QC DP 18X4IN

## (undated) DEVICE — COVER OVERHEAD SURG LT BLUE

## (undated) DEVICE — NDL SAFETY 22G X 1.5 ECLIPSE

## (undated) DEVICE — PAD CAST SPECIALIST STRL 3

## (undated) DEVICE — Device

## (undated) DEVICE — SHEET THYROID W/ISO-BAC

## (undated) DEVICE — SEE MEDLINE ITEM 157173

## (undated) DEVICE — DRESSING XEROFORM NONADH 1X8IN

## (undated) DEVICE — ADHESIVE DERMABOND ADVANCED

## (undated) DEVICE — SHEET DRAPE FAN-FOLDED 3/4

## (undated) DEVICE — APPLICATOR CHLORAPREP ORN 26ML

## (undated) DEVICE — BANDAGE MATRIX HK LOOP 4IN 5YD

## (undated) DEVICE — BLADE SCALP OPHTL BEVEL STR

## (undated) DEVICE — GLOVE BIOGEL PI MICRO SZ 6.5

## (undated) DEVICE — SEE MEDLINE ITEM 107746

## (undated) DEVICE — SEE MEDLINE ITEM 157110

## (undated) DEVICE — SEE MEDLINE ITEM 146292

## (undated) DEVICE — BLADE SURG STAINLESS STEEL #15

## (undated) DEVICE — GAUZE SPONGE 4X4 12PLY

## (undated) DEVICE — BANDAGE ESMARK ELASTIC ST 4X9

## (undated) DEVICE — PAD PREP 50/CA

## (undated) DEVICE — TOWEL OR DISP STRL BLUE 4/PK

## (undated) DEVICE — CANISTER SUCTION 2 LTR